# Patient Record
Sex: MALE | Race: WHITE | NOT HISPANIC OR LATINO | Employment: OTHER | URBAN - METROPOLITAN AREA
[De-identification: names, ages, dates, MRNs, and addresses within clinical notes are randomized per-mention and may not be internally consistent; named-entity substitution may affect disease eponyms.]

---

## 2017-01-11 RX ORDER — CLONAZEPAM 1 MG/1
TABLET ORAL
Qty: 60 TABLET | OUTPATIENT
Start: 2017-01-11

## 2017-01-13 ENCOUNTER — OFFICE VISIT (OUTPATIENT)
Dept: CARDIOLOGY | Facility: CLINIC | Age: 67
End: 2017-01-13
Payer: MEDICARE

## 2017-01-13 VITALS
HEIGHT: 73 IN | BODY MASS INDEX: 22.84 KG/M2 | SYSTOLIC BLOOD PRESSURE: 124 MMHG | WEIGHT: 172.31 LBS | HEART RATE: 81 BPM | DIASTOLIC BLOOD PRESSURE: 70 MMHG

## 2017-01-13 DIAGNOSIS — E78.5 HYPERLIPIDEMIA, UNSPECIFIED HYPERLIPIDEMIA TYPE: ICD-10-CM

## 2017-01-13 DIAGNOSIS — I10 ESSENTIAL HYPERTENSION: ICD-10-CM

## 2017-01-13 DIAGNOSIS — I25.10 CORONARY ARTERY DISEASE INVOLVING NATIVE CORONARY ARTERY OF NATIVE HEART WITHOUT ANGINA PECTORIS: Primary | ICD-10-CM

## 2017-01-13 DIAGNOSIS — I25.5 ISCHEMIC CARDIOMYOPATHY: ICD-10-CM

## 2017-01-13 DIAGNOSIS — I73.9 PERIPHERAL ARTERIAL DISEASE: ICD-10-CM

## 2017-01-13 DIAGNOSIS — Z95.1 HX OF CABG: ICD-10-CM

## 2017-01-13 PROCEDURE — 99213 OFFICE O/P EST LOW 20 MIN: CPT | Mod: PBBFAC,PO | Performed by: NURSE PRACTITIONER

## 2017-01-13 PROCEDURE — 99999 PR PBB SHADOW E&M-EST. PATIENT-LVL III: CPT | Mod: PBBFAC,,, | Performed by: NURSE PRACTITIONER

## 2017-01-13 PROCEDURE — 99213 OFFICE O/P EST LOW 20 MIN: CPT | Mod: S$PBB,,, | Performed by: NURSE PRACTITIONER

## 2017-01-13 NOTE — MR AVS SNAPSHOT
[facility]  [address] [facility]  [place] [address]  Phone: [phone]  Fax: [phone]                  Ilana Carr   2017 11:00 AM   Office Visit    Description:  Male : 1950   Provider:  Adelaida Esparza NP   Department:  [facility]           Reason for Visit     Follow-up                To Do List           Future Appointments        Provider Department Dept Phone    2017 11:30 AM LABORATORY, [place] [facility] [phone]    3/7/2017 9:00 AM Amauri Delatorre MD [facility] [phone]      Goals (5 Years of Data)     None      Follow-Up and Disposition     Return in about 3 months (around 2017).      [facility]     [facility] Care [facility] -  Assistance  Registered nurses in the [facility] provide clinical advisement, health education, appointment booking, and other advisory services.  Call for this free service at [phone].             Medications           Message regarding Medications     Verify the changes and/or additions to your medication regime listed below are the same as discussed with your clinician today.  If any of these changes or additions are incorrect, please notify your healthcare provider.        STOP taking these medications     oxycodone-acetaminophen (PERCOCET)  mg per tablet Take 1 tablet by mouth every 6 (six) hours as needed.           Verify that the below list of medications is an accurate representation of the medications you are currently taking.  If none reported, the list may be blank. If incorrect, please contact your healthcare provider. Carry this list with you in case of emergency.           Current Medications     albuterol 90 mcg/actuation inhaler Inhale 2 puffs into the lungs every 6 (six) hours as needed for Wheezing or Shortness of Breath.    aspirin (ECOTRIN) 81 MG EC tablet Take 81 mg by mouth once daily.    atorvastatin (LIPITOR) 20 MG tablet  "Take 20 mg by mouth once daily.    benazepril (LOTENSIN) 10 MG tablet Take 1 tablet (10 mg total) by mouth 2 (two) times daily.    buPROPion (WELLBUTRIN SR) 150 MG TBSR 12 hr tablet Take 1 tablet (150 mg total) by mouth 2 (two) times daily.    carvedilol (COREG) 12.5 MG tablet Take 12.5 mg by mouth 2 (two) times daily with meals.     clonazePAM (KLONOPIN) 1 MG tablet Take 1 tablet (1 mg total) by mouth 2 (two) times daily.    ferrous gluconate (FERGON) 325 MG Tab Take 1 tablet (325 mg total) by mouth daily with breakfast.    hydrochlorothiazide (MICROZIDE) 12.5 mg capsule Take 1 capsule (12.5 mg total) by mouth once daily.    krill-om3-dha-epa-om6-lip-astx 1,500-165-67.5 mg Cap Take 1 capsule by mouth.    multivitamin with minerals tablet Take 1 tablet by mouth.    nicotine polacrilex 2 MG Lozg Take 1 lozenge (2 mg total) by mouth as needed (take 4-8 pcs daily as needed).    omeprazole (PRILOSEC) 40 MG capsule Take 40 mg by mouth every morning.     zolpidem (AMBIEN) 10 mg Tab Take 10 mg by mouth as needed.            Clinical Reference Information           Vital Signs - Last Recorded  Most recent update: 1/13/2017 10:41 AM by Xiomara Fields MA    BP Pulse Ht Wt BMI    124/70 (BP Location: Left arm, Patient Position: Sitting, BP Method: Automatic) 81 6' 1" (1.854 m) 78.1 kg (172 lb 4.6 oz) 22.73 kg/m2      Blood Pressure          Most Recent Value    BP  124/70      Allergies as of 1/13/2017     No Known Drug Allergies      Immunizations Administered on Date of Encounter - 1/13/2017     None      "

## 2017-01-13 NOTE — PROGRESS NOTES
Subjective:    Patient ID:  Ilana Carr is a 66 y.o. male who presents for follow-up of Follow-up (Patient did not bring his medicine or list with him)      HPI: . Ilana Carr presents to the clinic for follow up of CAD, H/O CABG, CMP (EF 40-45% by echo 8/26/16), HTN, HLP. he stated that he is doing very well; he denied any chest pain or SOB. He stated that he has depression, and Wellbutrin is helping with that-he feels better.    Medications: he is not missing any doses.  Sodium: he does add small amount of salt to foods,  he is not  reading labels for sodium content.   Diet: eating 3 meals a day and boost in between meals to maintain his weight.  Exercise: he walks about 2 miles three times a week.  Tobacco:former smoker; uses nicotine losenges every few days. Nearly ready to stop those.. no alcohol use     Weight: 78.1 kg (172 lb 4.6 oz) he states that his daily weights has been stable  Wt Readings from Last 3 Encounters:   01/13/17 78.1 kg (172 lb 4.6 oz)   11/30/16 78 kg (171 lb 13.6 oz)   11/29/16 76.6 kg (168 lb 15.7 oz)     BP log: None. He does not monitor BP at home.    Review of Systems   Constitution: Negative for chills, decreased appetite, fever, night sweats, weight gain and weight loss.   HENT: Negative for congestion.    Cardiovascular: Negative for chest pain, claudication, cyanosis, dyspnea on exertion, irregular heartbeat, leg swelling, near-syncope, orthopnea, palpitations, paroxysmal nocturnal dyspnea and syncope.   Respiratory: Negative for cough, hemoptysis, shortness of breath, sputum production and wheezing.    Hematologic/Lymphatic: Negative for adenopathy and bleeding problem. Does not bruise/bleed easily.   Skin: Negative for color change and nail changes.   Gastrointestinal: Negative for bloating, abdominal pain, change in bowel habit, heartburn, hematochezia, melena, nausea and vomiting.   Genitourinary: Negative for hematuria.   Neurological: Negative for dizziness and  light-headedness.   Psychiatric/Behavioral: Negative for altered mental status.       Objective:   Physical Exam   Constitutional: He is oriented to person, place, and time. He appears well-developed and well-nourished. No distress.   HENT:   Head: Normocephalic and atraumatic.   Eyes: Conjunctivae are normal. No scleral icterus.   Neck: Neck supple. No JVD present. No tracheal deviation present. No thyromegaly present.   Cardiovascular: Normal rate, regular rhythm, normal heart sounds and intact distal pulses.  Exam reveals no gallop and no friction rub.    No murmur heard.  Pulmonary/Chest: Effort normal and breath sounds normal. No respiratory distress. He has no wheezes. He has no rales. He exhibits no tenderness.   Abdominal: Soft. Bowel sounds are normal. He exhibits no distension and no mass. There is no tenderness. There is no rebound and no guarding.   Musculoskeletal: Normal range of motion. He exhibits no edema.   Lymphadenopathy:     He has no cervical adenopathy.   Neurological: He is alert and oriented to person, place, and time.   Skin: Skin is warm and dry. No rash noted. He is not diaphoretic. No erythema. No pallor.   Pink   Psychiatric: He has a normal mood and affect.        Assessment:      1. Coronary artery disease involving native coronary artery of native heart without angina pectoris    2. Hx of CABG    3. Ischemic cardiomyopathy    4. Peripheral arterial disease    5. Essential hypertension    6. Hyperlipidemia, unspecified hyperlipidemia type        Plan:     Continue current medications as directed.  Sodium moderation.  Continue exercise.  Monitor BP at home.  Reinforced tobacco cessation.  Follow up in 3 months or call sooner for any problems.

## 2017-01-19 RX ORDER — BUPROPION HYDROCHLORIDE 150 MG/1
150 TABLET, EXTENDED RELEASE ORAL 2 TIMES DAILY
Qty: 180 TABLET | Refills: 3 | Status: SHIPPED | OUTPATIENT
Start: 2017-01-19 | End: 2017-10-04 | Stop reason: DRUGHIGH

## 2017-01-19 NOTE — TELEPHONE ENCOUNTER
----- Message from Geri Verma sent at 1/19/2017 10:24 AM CST -----  bupropion 150 refill needed..[phone]    [facility] - [place], [place] - [address]  [address]  [facility] [address]  Phone: [phone] Fax: [phone]

## 2017-01-24 RX ORDER — ALBUTEROL SULFATE 90 UG/1
2 AEROSOL, METERED RESPIRATORY (INHALATION) EVERY 6 HOURS PRN
Qty: 18 G | Refills: 5 | Status: SHIPPED | OUTPATIENT
Start: 2017-01-24 | End: 2018-12-07 | Stop reason: SDUPTHER

## 2017-01-27 ENCOUNTER — TELEPHONE (OUTPATIENT)
Dept: CARDIOLOGY | Facility: CLINIC | Age: 67
End: 2017-01-27

## 2017-01-27 NOTE — TELEPHONE ENCOUNTER
Cardiac rehab called to report the patients BP is running 140's/90's which is higher than usual.The patient denies any stress related issues or pain.He admits medication compliance.Suggested patient do a BP log  And will check next week If these abnormal clinical findings persist, appropriate workup will be completed. The patient understands that follow up is required to elucidate the situation. Needed we will have an appointment made to see provider.The patient agreed.

## 2017-01-31 ENCOUNTER — PATIENT MESSAGE (OUTPATIENT)
Dept: FAMILY MEDICINE | Facility: CLINIC | Age: 67
End: 2017-01-31

## 2017-01-31 RX ORDER — ZOLPIDEM TARTRATE 10 MG/1
TABLET ORAL
Qty: 30 TABLET | Refills: 2 | Status: SHIPPED | OUTPATIENT
Start: 2017-01-31 | End: 2017-01-31 | Stop reason: SDUPTHER

## 2017-01-31 RX ORDER — ZOLPIDEM TARTRATE 10 MG/1
TABLET ORAL
Qty: 30 TABLET | Refills: 2 | Status: SHIPPED | OUTPATIENT
Start: 2017-01-31 | End: 2017-04-01 | Stop reason: SDUPTHER

## 2017-02-01 ENCOUNTER — PATIENT MESSAGE (OUTPATIENT)
Dept: FAMILY MEDICINE | Facility: CLINIC | Age: 67
End: 2017-02-01

## 2017-02-17 ENCOUNTER — PATIENT MESSAGE (OUTPATIENT)
Dept: FAMILY MEDICINE | Facility: CLINIC | Age: 67
End: 2017-02-17

## 2017-02-17 RX ORDER — CLONAZEPAM 1 MG/1
TABLET ORAL
Qty: 60 TABLET | Refills: 2 | Status: SHIPPED | OUTPATIENT
Start: 2017-02-17 | End: 2017-04-14 | Stop reason: SDUPTHER

## 2017-02-17 NOTE — TELEPHONE ENCOUNTER
Last seen by you 11/30/16, will you authorize another refill or is appt needed now, please advise.

## 2017-02-20 ENCOUNTER — TELEPHONE (OUTPATIENT)
Dept: CARDIOLOGY | Facility: CLINIC | Age: 67
End: 2017-02-20

## 2017-02-20 NOTE — TELEPHONE ENCOUNTER
Received paperwork from [facility] to complete for patient.I called to speak with patient at both numbers and no answer.I left message the papers were received and will contact him when they are completed.

## 2017-02-21 ENCOUNTER — PATIENT MESSAGE (OUTPATIENT)
Dept: FAMILY MEDICINE | Facility: CLINIC | Age: 67
End: 2017-02-21

## 2017-02-21 ENCOUNTER — PATIENT MESSAGE (OUTPATIENT)
Dept: CARDIOLOGY | Facility: CLINIC | Age: 67
End: 2017-02-21

## 2017-02-22 ENCOUNTER — TELEPHONE (OUTPATIENT)
Dept: CARDIOLOGY | Facility: CLINIC | Age: 67
End: 2017-02-22

## 2017-02-22 NOTE — TELEPHONE ENCOUNTER
The patient was called and notified that his paperwork for the [facility] was requesting a stress test to complete his evaluation for disabilty status.He stated he did not want to do that at this time so paperwork can be completed as is and patient  tomorrow.

## 2017-02-24 ENCOUNTER — TELEPHONE (OUTPATIENT)
Dept: CARDIOLOGY | Facility: CLINIC | Age: 67
End: 2017-02-24

## 2017-02-24 ENCOUNTER — LAB VISIT (OUTPATIENT)
Dept: LAB | Facility: HOSPITAL | Age: 67
End: 2017-02-24
Attending: INTERNAL MEDICINE
Payer: MEDICARE

## 2017-02-24 DIAGNOSIS — I10 ESSENTIAL HYPERTENSION: ICD-10-CM

## 2017-02-24 DIAGNOSIS — I25.10 CORONARY ARTERY DISEASE INVOLVING NATIVE CORONARY ARTERY OF NATIVE HEART WITHOUT ANGINA PECTORIS: Primary | ICD-10-CM

## 2017-02-24 DIAGNOSIS — I25.10 CORONARY ARTERY DISEASE INVOLVING NATIVE CORONARY ARTERY OF NATIVE HEART WITHOUT ANGINA PECTORIS: ICD-10-CM

## 2017-02-24 LAB
ANION GAP SERPL CALC-SCNC: 12 MMOL/L
BUN SERPL-MCNC: 21 MG/DL
CALCIUM SERPL-MCNC: 9.9 MG/DL
CHLORIDE SERPL-SCNC: 106 MMOL/L
CO2 SERPL-SCNC: 26 MMOL/L
CREAT SERPL-MCNC: 1.1 MG/DL
EST. GFR  (AFRICAN AMERICAN): >60 ML/MIN/1.73 M^2
EST. GFR  (NON AFRICAN AMERICAN): >60 ML/MIN/1.73 M^2
GLUCOSE SERPL-MCNC: 119 MG/DL
MAGNESIUM SERPL-MCNC: 2 MG/DL
POTASSIUM SERPL-SCNC: 5.3 MMOL/L
SODIUM SERPL-SCNC: 144 MMOL/L

## 2017-02-24 PROCEDURE — 36415 COLL VENOUS BLD VENIPUNCTURE: CPT | Mod: PO

## 2017-02-24 PROCEDURE — 83735 ASSAY OF MAGNESIUM: CPT

## 2017-02-24 PROCEDURE — 80048 BASIC METABOLIC PNL TOTAL CA: CPT

## 2017-02-24 NOTE — TELEPHONE ENCOUNTER
The patient is stable and doing well at rehab per Cardiac Rehab nurse.His BP stable and is in 31 of 36 weeks.He today is having triplets and trigeminy for short period.The patient did not experience any symptoms during that time and his BP was stable.Labwork ordered and Dr House notified.

## 2017-02-24 NOTE — TELEPHONE ENCOUNTER
The patient is participating in cardiac Rehab at [facility] and it was reported he was having triplets and trigeminy . No symptoms. Patient notified of lab work scheduled today after rehab.message to Dr House.

## 2017-02-27 ENCOUNTER — PATIENT MESSAGE (OUTPATIENT)
Dept: CARDIOLOGY | Facility: CLINIC | Age: 67
End: 2017-02-27

## 2017-02-28 ENCOUNTER — LAB VISIT (OUTPATIENT)
Dept: LAB | Facility: HOSPITAL | Age: 67
End: 2017-02-28
Attending: FAMILY MEDICINE
Payer: MEDICARE

## 2017-02-28 DIAGNOSIS — D64.9 ANEMIA, UNSPECIFIED TYPE: ICD-10-CM

## 2017-02-28 LAB
BASOPHILS # BLD AUTO: 0.01 K/UL
BASOPHILS NFR BLD: 0.1 %
DIFFERENTIAL METHOD: ABNORMAL
EOSINOPHIL # BLD AUTO: 0.3 K/UL
EOSINOPHIL NFR BLD: 4.8 %
ERYTHROCYTE [DISTWIDTH] IN BLOOD BY AUTOMATED COUNT: 16 %
FERRITIN SERPL-MCNC: 50 NG/ML
HCT VFR BLD AUTO: 43.8 %
HGB BLD-MCNC: 13.9 G/DL
IRON SERPL-MCNC: 132 UG/DL
LYMPHOCYTES # BLD AUTO: 1.3 K/UL
LYMPHOCYTES NFR BLD: 18.3 %
MCH RBC QN AUTO: 29.9 PG
MCHC RBC AUTO-ENTMCNC: 31.7 %
MCV RBC AUTO: 94 FL
MONOCYTES # BLD AUTO: 0.7 K/UL
MONOCYTES NFR BLD: 9.9 %
NEUTROPHILS # BLD AUTO: 4.6 K/UL
NEUTROPHILS NFR BLD: 66.6 %
PLATELET # BLD AUTO: 188 K/UL
PMV BLD AUTO: 10.7 FL
RBC # BLD AUTO: 4.65 M/UL
SATURATED IRON: 40 %
TOTAL IRON BINDING CAPACITY: 334 UG/DL
TRANSFERRIN SERPL-MCNC: 226 MG/DL
WBC # BLD AUTO: 6.84 K/UL

## 2017-02-28 PROCEDURE — 84466 ASSAY OF TRANSFERRIN: CPT

## 2017-02-28 PROCEDURE — 85025 COMPLETE CBC W/AUTO DIFF WBC: CPT

## 2017-02-28 PROCEDURE — 83540 ASSAY OF IRON: CPT

## 2017-02-28 PROCEDURE — 36415 COLL VENOUS BLD VENIPUNCTURE: CPT | Mod: PO

## 2017-02-28 PROCEDURE — 82728 ASSAY OF FERRITIN: CPT

## 2017-03-02 ENCOUNTER — PATIENT MESSAGE (OUTPATIENT)
Dept: FAMILY MEDICINE | Facility: CLINIC | Age: 67
End: 2017-03-02

## 2017-03-03 ENCOUNTER — PATIENT MESSAGE (OUTPATIENT)
Dept: FAMILY MEDICINE | Facility: CLINIC | Age: 67
End: 2017-03-03

## 2017-03-03 RX ORDER — ATORVASTATIN CALCIUM 20 MG/1
20 TABLET, FILM COATED ORAL DAILY
Qty: 90 TABLET | Refills: 2 | Status: SHIPPED | OUTPATIENT
Start: 2017-03-03

## 2017-03-03 RX ORDER — CARVEDILOL 12.5 MG/1
12.5 TABLET ORAL 2 TIMES DAILY WITH MEALS
Qty: 180 TABLET | Refills: 2 | Status: SHIPPED | OUTPATIENT
Start: 2017-03-03 | End: 2019-04-12 | Stop reason: DRUGHIGH

## 2017-03-05 ENCOUNTER — PATIENT MESSAGE (OUTPATIENT)
Dept: VASCULAR SURGERY | Facility: CLINIC | Age: 67
End: 2017-03-05

## 2017-03-05 ENCOUNTER — PATIENT MESSAGE (OUTPATIENT)
Dept: FAMILY MEDICINE | Facility: CLINIC | Age: 67
End: 2017-03-05

## 2017-03-08 ENCOUNTER — OFFICE VISIT (OUTPATIENT)
Dept: GASTROENTEROLOGY | Facility: CLINIC | Age: 67
End: 2017-03-08
Payer: MEDICARE

## 2017-03-08 ENCOUNTER — LAB VISIT (OUTPATIENT)
Dept: LAB | Facility: HOSPITAL | Age: 67
End: 2017-03-08
Attending: NURSE PRACTITIONER
Payer: MEDICARE

## 2017-03-08 VITALS
DIASTOLIC BLOOD PRESSURE: 80 MMHG | WEIGHT: 173.75 LBS | SYSTOLIC BLOOD PRESSURE: 122 MMHG | HEART RATE: 68 BPM | HEIGHT: 73 IN | BODY MASS INDEX: 23.03 KG/M2

## 2017-03-08 DIAGNOSIS — B18.2 CHRONIC HEPATITIS C WITHOUT HEPATIC COMA: Primary | ICD-10-CM

## 2017-03-08 DIAGNOSIS — B18.2 CHRONIC HEPATITIS C WITHOUT HEPATIC COMA: ICD-10-CM

## 2017-03-08 PROCEDURE — 87522 HEPATITIS C REVRS TRNSCRPJ: CPT

## 2017-03-08 PROCEDURE — 36415 COLL VENOUS BLD VENIPUNCTURE: CPT

## 2017-03-08 PROCEDURE — 99213 OFFICE O/P EST LOW 20 MIN: CPT | Mod: S$PBB,,, | Performed by: NURSE PRACTITIONER

## 2017-03-08 PROCEDURE — 99999 PR PBB SHADOW E&M-EST. PATIENT-LVL III: CPT | Mod: PBBFAC,,, | Performed by: NURSE PRACTITIONER

## 2017-03-08 NOTE — MR AVS SNAPSHOT
[facility]  [address]  [place] [address]  Phone: [phone]  Fax: [phone]                  Ilana Carr   3/8/2017 9:20 AM   Office Visit    Description:  Male : 1950   Provider:  Candy Griffin NP   [facility]           Reason for Visit     Hepatitis C           Diagnoses this Visit        Comments    Chronic hepatitis C without hepatic coma    -  Primary            To Do List           Future Appointments        Provider Department Dept Phone    3/8/2017 11:00 AM LAB, SAME DAY [facility] [phone]      Goals (5 Years of Data)     None      Follow-Up and Disposition     Return if symptoms worsen or fail to improve.      [facility]     [facility] Care [facility] -  Assistance  Registered nurses in the [facility] provide clinical advisement, health education, appointment booking, and other advisory services.  Call for this free service at [phone].             Medications           Message regarding Medications     Verify the changes and/or additions to your medication regime listed below are the same as discussed with your clinician today.  If any of these changes or additions are incorrect, please notify your healthcare provider.             Verify that the below list of medications is an accurate representation of the medications you are currently taking.  If none reported, the list may be blank. If incorrect, please contact your healthcare provider. Carry this list with you in case of emergency.           Current Medications     albuterol 90 mcg/actuation inhaler Inhale 2 puffs into the lungs every 6 (six) hours as needed for Wheezing or Shortness of Breath.    aspirin (ECOTRIN) 81 MG EC tablet Take 81 mg by mouth once daily.    atorvastatin (LIPITOR) 20 MG tablet Take 1 tablet (20 mg total) by mouth once daily.    benazepril (LOTENSIN) 10 MG tablet Take 1 tablet  "(10 mg total) by mouth 2 (two) times daily.    buPROPion (WELLBUTRIN SR) 150 MG TBSR 12 hr tablet Take 1 tablet (150 mg total) by mouth 2 (two) times daily.    carvedilol (COREG) 12.5 MG tablet Take 1 tablet (12.5 mg total) by mouth 2 (two) times daily with meals.    clonazePAM (KLONOPIN) 1 MG tablet Take 1 tablet (1 mg total) by mouth 2 (two) times daily.    ferrous gluconate (FERGON) 325 MG Tab Take 1 tablet (325 mg total) by mouth daily with breakfast.    hydrochlorothiazide (MICROZIDE) 12.5 mg capsule Take 1 capsule (12.5 mg total) by mouth once daily.    krill-om3-dha-epa-om6-lip-astx 1,500-165-67.5 mg Cap Take 1 capsule by mouth.    multivitamin with minerals tablet Take 1 tablet by mouth.    omeprazole (PRILOSEC) 40 MG capsule Take 40 mg by mouth every morning.     UNABLE TO FIND Med Name: Nicotine Lozenge    zolpidem (AMBIEN) 10 mg Tab Take one by mouth, qhs, as needed    krill-om3-dha-epa-om6-lip-astx 1,500-165-67.5 mg Cap Take 1 capsule by mouth.           Clinical Reference Information           Your Vitals Were     BP Pulse Height Weight BMI    122/80 68 6' 1" (1.854 m) 78.8 kg (173 lb 11.6 oz) 22.92 kg/m2      Blood Pressure          Most Recent Value    BP  122/80      Allergies as of 3/8/2017     No Known Drug Allergies      Immunizations Administered on Date of Encounter - 3/8/2017     None      Orders Placed During Today's Visit     Future Labs/Procedures Expected by Expires    Hepatitis C RNA, quantitative, PCR  3/8/2017 5/7/2018      Language Assistance Services     ATTENTION: Language assistance services are available, free of charge. Please call [phone].      ATENCIÓN: Si habla selina, tiene a tavarez disposición servicios gratuitos de asistencia lingüística. Llame al [phone].     CHÚ Ý: N?u b?n nói Ti?ng Vi?t, có các d?ch v? h? tr? ngôn ng? mi?n phí dành cho b?n. G?i s? [phone].         [facility] complies with applicable Federal civil rights laws and does not " discriminate on the basis of race, color, national origin, age, disability, or sex.

## 2017-03-08 NOTE — PROGRESS NOTES
Clinic Follow Up:  [facility] Follow Up Note    Reason for Follow Up:  The encounter diagnosis was Chronic hepatitis C without hepatic coma.    PCP: Long Acosta       HPI:  This is a 66 y.o. male here for follow up of the above  Patient is an established patient but is new to me. He was previously treated with [facility] for Hepatitis C. He cleared the virus after one month of treatment. He is here for a 1 year follow up to repeat his viral load. He is doing well and has no complaints at this time. He denies any abdominal pian, nausea/vomiting, change in bowel pattern, hematochezia, melena, or weight loss.     Review of Systems   Constitutional: Negative for activity change and appetite change.        As per interval history above   Respiratory: Negative for cough and chest tightness.    Cardiovascular: Negative for chest pain.   Gastrointestinal: Negative for abdominal distention, abdominal pain, anal bleeding, blood in stool, constipation, diarrhea, nausea, rectal pain and vomiting.   Skin: Negative for color change and rash.       Medical History:  Past Medical History:   Diagnosis Date    Alcoholism in remission     Anxiety     CAD (coronary artery disease) 9/2/2016    Chronic back pain     pain mgt    Chronic hepatitis C without hepatic coma 1/8/2016    Colon polyps     COPD (chronic obstructive pulmonary disease)     Depressed left ventricular systolic function 9/6/2016    Depression     Diverticulosis     DJD (degenerative joint disease) of hip     Emphysema of lung     Esophageal ulcer with bleeding     GERD (gastroesophageal reflux disease)     Hepatitis C     completed Interferon therapy    Hiatal hernia     Hyperlipidemia with target LDL less than 100 1/7/2015    Hypertension     Hypertriglyceridemia     Osteomyelitis of right foot     Peripheral arterial disease     Spells     last Sept 2013, not sure if it was TIA v seizure    Squamous cell skin cancer      Testosterone deficiency     Tubular adenoma of colon        Surgical History:   Past Surgical History:   Procedure Laterality Date    CARDIAC CATHETERIZATION      COLONOSCOPY  2008    COLONOSCOPY N/A 1/8/2016    Procedure: COLONOSCOPY;  Surgeon: Amauri Delatorre MD;  Location[facility];  Service: Endoscopy;  Laterality: N/A;    CORONARY ARTERY BYPASS GRAFT  09/09/2016    FOOT SURGERY Right 12/6/2013    fifth metatarsal head resection [Other]    ORIF WRIST FRACTURE      LT    SHOULDER SURGERY      left    TONSILLECTOMY      UPPER GASTROINTESTINAL ENDOSCOPY  2010       Family History:   Family History   Problem Relation Age of Onset    Lung cancer Mother     Hypertension Mother     Hypertension Father     Arrhythmia Brother 60    Colon cancer Neg Hx     Stomach cancer Neg Hx        Social History:   Social History   Substance Use Topics    Smoking status: Former Smoker     Packs/day: 1.50     Years: 40.00     Types: Cigarettes, Cigars     Quit date: 9/9/2016    Smokeless tobacco: Former User     Quit date: 9/9/2016    Alcohol use No      Comment: quit 30 y- prior alcoholism       Allergies:   Review of patient's allergies indicates:   Allergen Reactions    No known drug allergies        Home Medications:  Current Outpatient Prescriptions on File Prior to Visit   Medication Sig Dispense Refill    albuterol 90 mcg/actuation inhaler Inhale 2 puffs into the lungs every 6 (six) hours as needed for Wheezing or Shortness of Breath. 18 g 5    aspirin (ECOTRIN) 81 MG EC tablet Take 81 mg by mouth once daily.      atorvastatin (LIPITOR) 20 MG tablet Take 1 tablet (20 mg total) by mouth once daily. 90 tablet 2    benazepril (LOTENSIN) 10 MG tablet Take 1 tablet (10 mg total) by mouth 2 (two) times daily. 180 tablet 3    buPROPion (WELLBUTRIN SR) 150 MG TBSR 12 hr tablet Take 1 tablet (150 mg total) by mouth 2 (two) times daily. 180 tablet 3    carvedilol (COREG) 12.5 MG tablet Take 1 tablet (12.5 mg  "total) by mouth 2 (two) times daily with meals. 180 tablet 2    clonazePAM (KLONOPIN) 1 MG tablet Take 1 tablet (1 mg total) by mouth 2 (two) times daily. 60 tablet 2    ferrous gluconate (FERGON) 325 MG Tab Take 1 tablet (325 mg total) by mouth daily with breakfast. 90 tablet 0    hydrochlorothiazide (MICROZIDE) 12.5 mg capsule Take 1 capsule (12.5 mg total) by mouth once daily. 90 capsule 3    multivitamin with minerals tablet Take 1 tablet by mouth.      omeprazole (PRILOSEC) 40 MG capsule Take 40 mg by mouth every morning.       zolpidem (AMBIEN) 10 mg Tab Take one by mouth, qhs, as needed 30 tablet 2    krill-om3-dha-epa-om6-lip-astx 1,500-165-67.5 mg Cap Take 1 capsule by mouth.       No current facility-administered medications on file prior to visit.        Physical Exam:  Vital Signs:  /80  Pulse 68  Ht 6' 1" (1.854 m)  Wt 78.8 kg (173 lb 11.6 oz)  BMI 22.92 kg/m2  Body mass index is 22.92 kg/(m^2).  Physical Exam   Constitutional: He is oriented to person, place, and time and well-developed, well-nourished, and in no distress. No distress.   HENT:   Head: Normocephalic.   Eyes: Conjunctivae are normal. Pupils are equal, round, and reactive to light.   Cardiovascular: Normal rate, regular rhythm and normal heart sounds.    Pulmonary/Chest: Effort normal and breath sounds normal. No respiratory distress.   Abdominal: Soft. Bowel sounds are normal. He exhibits no distension. There is no tenderness.   Neurological: He is alert and oriented to person, place, and time. No cranial nerve deficit.   Skin: Skin is warm and dry. No rash noted.   Psychiatric: Mood and affect normal.   Vitals reviewed.        CRC Screening: Up to date with screening colonoscopy.    Assessment:  1. Chronic hepatitis C without hepatic coma        Recommendations:  Chronic hepatitis C without hepatic coma  - Patient doing well with no evidence of cirrhosis or liver damage.   - Will check viral load.  - If not detected, " then no need for routine follow up. May follow up as needed.   -     Hepatitis C RNA, quantitative, PCR; Future; Expected date: 3/8/17        Return to Clinic:  Return if symptoms worsen or fail to improve.    Thank you for the opportunity to participate in the care of this patient.  CARL LoveC

## 2017-03-12 ENCOUNTER — PATIENT MESSAGE (OUTPATIENT)
Dept: GASTROENTEROLOGY | Facility: CLINIC | Age: 67
End: 2017-03-12

## 2017-03-13 LAB
HCV LOG: <1.08 LOG (10) IU/ML
HCV RNA QUANT PCR: <12 IU/ML
HCV, QUALITATIVE: NOT DETECTED IU/ML

## 2017-04-01 ENCOUNTER — PATIENT MESSAGE (OUTPATIENT)
Dept: FAMILY MEDICINE | Facility: CLINIC | Age: 67
End: 2017-04-01

## 2017-04-03 RX ORDER — ZOLPIDEM TARTRATE 10 MG/1
TABLET ORAL
Qty: 30 TABLET | Refills: 1 | Status: SHIPPED | OUTPATIENT
Start: 2017-04-03 | End: 2017-05-15

## 2017-04-04 ENCOUNTER — PATIENT MESSAGE (OUTPATIENT)
Dept: FAMILY MEDICINE | Facility: CLINIC | Age: 67
End: 2017-04-04

## 2017-04-04 NOTE — TELEPHONE ENCOUNTER
I would not recommend using albuterol pills, these are no longer considered an acceptable treatment or standard of care.  I have never seen anyone use them.  Too many side effects, particularly with his history of heart disease.  We can refer him to a pulmonary specialist if he would like to address otherwise.

## 2017-04-05 ENCOUNTER — PATIENT MESSAGE (OUTPATIENT)
Dept: FAMILY MEDICINE | Facility: CLINIC | Age: 67
End: 2017-04-05

## 2017-04-11 ENCOUNTER — TELEPHONE (OUTPATIENT)
Dept: SMOKING CESSATION | Facility: CLINIC | Age: 67
End: 2017-04-11

## 2017-04-12 ENCOUNTER — PATIENT MESSAGE (OUTPATIENT)
Dept: RESEARCH | Facility: HOSPITAL | Age: 67
End: 2017-04-12

## 2017-04-12 ENCOUNTER — CLINICAL SUPPORT (OUTPATIENT)
Dept: SMOKING CESSATION | Facility: CLINIC | Age: 67
End: 2017-04-12
Payer: COMMERCIAL

## 2017-04-12 DIAGNOSIS — F17.200 NICOTINE DEPENDENCE: Primary | ICD-10-CM

## 2017-04-12 PROCEDURE — 99407 BEHAV CHNG SMOKING > 10 MIN: CPT | Mod: S$GLB,,,

## 2017-04-17 RX ORDER — CLONAZEPAM 1 MG/1
TABLET ORAL
Qty: 60 TABLET | Refills: 1 | Status: SHIPPED | OUTPATIENT
Start: 2017-04-17 | End: 2017-07-18 | Stop reason: SDUPTHER

## 2017-04-18 ENCOUNTER — OFFICE VISIT (OUTPATIENT)
Dept: CARDIOLOGY | Facility: CLINIC | Age: 67
End: 2017-04-18
Payer: MEDICARE

## 2017-04-18 VITALS
DIASTOLIC BLOOD PRESSURE: 87 MMHG | HEART RATE: 68 BPM | WEIGHT: 172.63 LBS | HEIGHT: 73 IN | BODY MASS INDEX: 22.88 KG/M2 | SYSTOLIC BLOOD PRESSURE: 145 MMHG

## 2017-04-18 DIAGNOSIS — I25.10 CORONARY ARTERY DISEASE INVOLVING NATIVE CORONARY ARTERY OF NATIVE HEART WITHOUT ANGINA PECTORIS: ICD-10-CM

## 2017-04-18 DIAGNOSIS — I25.5 ISCHEMIC CARDIOMYOPATHY: ICD-10-CM

## 2017-04-18 DIAGNOSIS — I10 ESSENTIAL HYPERTENSION: ICD-10-CM

## 2017-04-18 DIAGNOSIS — E78.5 HYPERLIPIDEMIA, UNSPECIFIED HYPERLIPIDEMIA TYPE: ICD-10-CM

## 2017-04-18 DIAGNOSIS — Z95.1 HX OF CABG: ICD-10-CM

## 2017-04-18 DIAGNOSIS — R06.02 SOB (SHORTNESS OF BREATH): Primary | ICD-10-CM

## 2017-04-18 DIAGNOSIS — I73.9 PERIPHERAL ARTERIAL DISEASE: ICD-10-CM

## 2017-04-18 PROCEDURE — 99999 PR PBB SHADOW E&M-EST. PATIENT-LVL III: CPT | Mod: PBBFAC,,, | Performed by: NURSE PRACTITIONER

## 2017-04-18 PROCEDURE — 99213 OFFICE O/P EST LOW 20 MIN: CPT | Mod: PBBFAC,PO | Performed by: NURSE PRACTITIONER

## 2017-04-18 PROCEDURE — 99213 OFFICE O/P EST LOW 20 MIN: CPT | Mod: S$PBB,,, | Performed by: NURSE PRACTITIONER

## 2017-04-18 RX ORDER — FUROSEMIDE 20 MG/1
20 TABLET ORAL DAILY PRN
Qty: 30 TABLET | Refills: 11 | Status: SHIPPED | OUTPATIENT
Start: 2017-04-18 | End: 2017-05-10

## 2017-04-18 NOTE — PATIENT INSTRUCTIONS
Hold hydrocholorothiazide for the two days that you take lasix.  Weigh yourself daily.  Reduce sodium intake

## 2017-04-18 NOTE — MR AVS SNAPSHOT
[facility]  [address]  [place] [address]  Phone: [phone]  Fax: [phone]                  Ilana Carr   2017 8:20 AM   Office Visit    Description:  Male : 1950   Provider:  Adelaida Esparza NP   Department:  [facility]           Reason for Visit     Follow-up           Diagnoses this Visit        Comments    SOB (shortness of breath)    -  Primary            To Do List           Future Appointments        Provider Department Dept Phone    2017 8:00 AM STEPHANIA SMITH SCHEDULE [facility] [phone]    5/10/2017 10:20 AM Adelaida Esparza NP [facility] [phone]      Goals (5 Years of Data)     None       These Medications        Disp Refills Start End    furosemide (LASIX) 20 MG tablet 30 tablet 11 2017    Take 1 tablet (20 mg total) by mouth daily as needed. - Oral    Pharmacy: [facility] - [place] [address] #: [phone]         [facility]     [facility] Care [facility] -  Assistance  Unless otherwise directed by your provider, please contact [facility], our nurse care line that is available for  assistance.     Registered nurses in the [facility] provide: appointment scheduling, clinical advisement, health education, and other advisory services.  Call: [phone] (toll free)               Medications           Message regarding Medications     Verify the changes and/or additions to your medication regime listed below are the same as discussed with your clinician today.  If any of these changes or additions are incorrect, please notify your healthcare provider.        START taking these NEW medications        Refills    furosemide (LASIX) 20 MG tablet 11    Sig: Take 1 tablet (20 mg total) by mouth daily as needed.    Class: Normal    Route: Oral      STOP taking these medications     UNABLE TO FIND Med Name: Nicotine Lozenge           Verify that the below  "list of medications is an accurate representation of the medications you are currently taking.  If none reported, the list may be blank. If incorrect, please contact your healthcare provider. Carry this list with you in case of emergency.           Current Medications     albuterol 90 mcg/actuation inhaler Inhale 2 puffs into the lungs every 6 (six) hours as needed for Wheezing or Shortness of Breath.    aspirin (ECOTRIN) 81 MG EC tablet Take 81 mg by mouth once daily.    atorvastatin (LIPITOR) 20 MG tablet Take 1 tablet (20 mg total) by mouth once daily.    benazepril (LOTENSIN) 10 MG tablet Take 1 tablet (10 mg total) by mouth 2 (two) times daily.    buPROPion (WELLBUTRIN SR) 150 MG TBSR 12 hr tablet Take 1 tablet (150 mg total) by mouth 2 (two) times daily.    carvedilol (COREG) 12.5 MG tablet Take 1 tablet (12.5 mg total) by mouth 2 (two) times daily with meals.    clonazePAM (KLONOPIN) 1 MG tablet TAKE ONE TABLET BY MOUTH TWICE DAILY    ferrous gluconate (FERGON) 325 MG Tab Take 1 tablet (325 mg total) by mouth daily with breakfast.    hydrochlorothiazide (MICROZIDE) 12.5 mg capsule Take 1 capsule (12.5 mg total) by mouth once daily.    krill-om3-dha-epa-om6-lip-astx 1,500-165-67.5 mg Cap Take 1 capsule by mouth.    multivitamin with minerals tablet Take 1 tablet by mouth.    omeprazole (PRILOSEC) 40 MG capsule Take 40 mg by mouth every morning.     zolpidem (AMBIEN) 10 mg Tab TAKE ONE TABLET BY MOUTH AT BEDTIME AS NEEDED    furosemide (LASIX) 20 MG tablet Take 1 tablet (20 mg total) by mouth daily as needed.           Clinical Reference Information           Your Vitals Were     BP Pulse Height Weight BMI    145/87 (BP Location: Left arm, Patient Position: Sitting, BP Method: Automatic) 68 6' 1" (1.854 m) 78.3 kg (172 lb 9.9 oz) 22.77 kg/m2      Blood Pressure          Most Recent Value    BP  (!)  145/87      Allergies as of 4/18/2017     No Known Drug Allergies      Immunizations Administered on Date of " Encounter - 4/18/2017     None      Orders Placed During Today's Visit     Future Labs/Procedures Expected by Expires    2D Echo w/ Color Flow Doppler  As directed 4/18/2018      Instructions    Hold hydrocholorothiazide for the two days that you take lasix.  Weigh yourself daily.  Reduce sodium intake       Language Assistance Services     ATTENTION: Language assistance services are available, free of charge. Please call [phone].      ATENCIÓN: Si habla español, tiene a tavarez disposición servicios gratuitos de asistencia lingüística. Llame al [phone].     CHÚ Ý: N?u b?n nói Ti?ng Vi?t, có các d?ch v? h? tr? ngôn ng? mi?n phí dành cho b?n. G?i s? [phone].         [facility] complies with applicable Federal civil rights laws and does not discriminate on the basis of race, color, national origin, age, disability, or sex.

## 2017-04-18 NOTE — PROGRESS NOTES
Subjective:    Patient ID:  Ilana Carr is a 66 y.o. male who presents for follow-up of Follow-up      HPI: Mr. Ilana Carr presents to the clinic for follow up of CAD, CABG, ICMP, (EF 40-45% by echo 8/2016), HTN, HLP. he stated that he is having episodes of SOB that occur mostly at night when getting ready for bed. He stated that he notices it after he brushes his teeth and walks to bed. He is unable to lay down until SOB resolves. It does resolve within a few minutes. He denied any chest discomfort or palpitations. He stated that he is able to walk during the day without provoking the same level of SOB. He does get a little short-winded when walking, but this is chronic and unchanged from the past.   He has been using albuterol inhaler for SOB, but he doesn't think that it helps. He stated that time and rest for several minutes does help.    Echo 8/26/16: CONCLUSIONS     1 - Concentric hypertrophy.     2 - Mildly to moderately depressed left ventricular systolic function (EF 40%).     3 - Normal left ventricular diastolic function.     4 - Normal right ventricular systolic function .     5 - The estimated PA systolic pressure is 34 mmHg.     6 - Trivial mitral regurgitation.     7 - Increased central venous pressure.     8 - WMA:  following segments were severely hypokinetic: mid inferior wall. Global left ventricular systolic function appears mildly to moderately depressed. Visually estimated ejection fraction is 40-45%    Pharm NM stress test 8/3/16: Impression: ABNORMAL MYOCARDIAL PERFUSION  1. The perfusion scan is free of evidence for myocardial ischemia.   2. There is moderate intensity fixed defect in the inferoapical wall of the left ventricle, consistent with myocardial injury, and a moderate to large size fixed defect of moderate to severe intensity that extends from the base to the apical inferior wall of the left ventricle, consistent with myocardial injury.   3. There is abnormal wall motion at  rest showing hypokinesis of the inferior wall of the left ventricle, and moderate hypokinesis of the  wall of the left ventricle.   4. There is resting LV dysfunction with a reduced ejection fraction of 26 %.   5. The ventricular volumes are normal at rest and stress.   6. The extracardiac distribution of radioactivity is normal.     Medications: he is not missing any doses.  Sodium: he does add some salt to foods at the table, but not when cooking.  he is not  reading labels for sodium content.   Exercise: he walks  Tobacco:Fomer smoker; uses nicotine lozenges about twice a week.. no alcohol use     Weight: 78.3 kg (172 lb 9.9 oz) he states that his daily weights has been stable  Wt Readings from Last 3 Encounters:   04/18/17 78.3 kg (172 lb 9.9 oz)   03/08/17 78.8 kg (173 lb 11.6 oz)   01/13/17 78.1 kg (172 lb 4.6 oz)     BP log: None.    Review of Systems   Constitution: Negative for chills, decreased appetite, diaphoresis, fever, weakness, malaise/fatigue, night sweats, weight gain and weight loss.   HENT: Negative for congestion.    Cardiovascular: Negative for chest pain, claudication, cyanosis, irregular heartbeat, leg swelling, near-syncope, orthopnea, palpitations, paroxysmal nocturnal dyspnea and syncope.   Respiratory: Positive for shortness of breath. Negative for cough, hemoptysis, sputum production and wheezing.    Hematologic/Lymphatic: Negative for adenopathy and bleeding problem. Does not bruise/bleed easily.   Skin: Negative for color change and nail changes.   Gastrointestinal: Negative for bloating, abdominal pain, change in bowel habit, heartburn, hematochezia, melena, nausea and vomiting.   Genitourinary: Negative for hematuria.   Neurological: Negative for dizziness and light-headedness.   Psychiatric/Behavioral: Negative for altered mental status.       Objective:   Physical Exam   Constitutional: He is oriented to person, place, and time. He appears well-developed and well-nourished. No  distress.   HENT:   Head: Normocephalic and atraumatic.   Eyes: Conjunctivae are normal. No scleral icterus.   Neck: Neck supple. No JVD present. No tracheal deviation present. No thyromegaly present.   Cardiovascular: Normal rate, regular rhythm and intact distal pulses.  Exam reveals gallop and S3. Exam reveals no friction rub.    No murmur heard.  Pulmonary/Chest: Effort normal and breath sounds normal. No respiratory distress. He has no wheezes. He has no rales. He exhibits no tenderness.   Abdominal: Soft. Bowel sounds are normal. He exhibits no distension and no mass. There is no tenderness. There is no rebound and no guarding.   Musculoskeletal: Normal range of motion. He exhibits no edema.   Lymphadenopathy:     He has no cervical adenopathy.   Neurological: He is alert and oriented to person, place, and time.   Skin: Skin is warm and dry. No rash noted. He is not diaphoretic. No erythema. No pallor.   Pink   Psychiatric: He has a normal mood and affect.   Results for TOMA MENDES (MRN [id number]) as of 4/18/2017 08:21   Ref. Range 11/28/2016 09:55 2/24/2017 11:10   Sodium Latest Ref Range: 136 - 145 mmol/L  144   Potassium Latest Ref Range: 3.5 - 5.1 mmol/L  5.3 (H)   Chloride Latest Ref Range: 95 - 110 mmol/L  106   CO2 Latest Ref Range: 23 - 29 mmol/L  26   Anion Gap Latest Ref Range: 8 - 16 mmol/L  12   BUN, Bld Latest Ref Range: 8 - 23 mg/dL  21   Creatinine Latest Ref Range: 0.5 - 1.4 mg/dL  1.1   eGFR if non African American Latest Ref Range: >60 mL/min/1.73 m^2  >60.0   eGFR if African American Latest Ref Range: >60 mL/min/1.73 m^2  >60.0   Glucose Latest Ref Range: 70 - 110 mg/dL  119 (H)   Calcium Latest Ref Range: 8.7 - 10.5 mg/dL  9.9   Magnesium Latest Ref Range: 1.6 - 2.6 mg/dL  2.0   Triglycerides Latest Ref Range: 30 - 150 mg/dL 73    Cholesterol Latest Ref Range: 120 - 199 mg/dL 111 (L)    HDL Latest Ref Range: 40 - 75 mg/dL 39 (L)    LDL Cholesterol Latest Ref Range: 63.0 - 159.0 mg/dL 57.4  (L)    Total Cholesterol/HDL Ratio Latest Ref Range: 2.0 - 5.0  2.8    TSH Latest Ref Range: 0.400 - 4.000 uIU/mL 2.061    Non HDL 72.      Assessment:      1. SOB (shortness of breath)    2. Coronary artery disease involving native coronary artery of native heart without angina pectoris    3. Hx of CABG    4. Ischemic cardiomyopathy    5. Peripheral arterial disease    6. Essential hypertension    7. Hyperlipidemia, unspecified hyperlipidemia type        Plan:   Lasix 20 mg QD for two days.  Hold HCTZ for two days.  Echocardiogram complete.  Sodium restriction.  Daily weights.  50-pack year of smoking-consider pulmonary consult if no improvement or worsening of heart function.  Follow up after testing complete.

## 2017-05-01 ENCOUNTER — CLINICAL SUPPORT (OUTPATIENT)
Dept: CARDIOLOGY | Facility: CLINIC | Age: 67
End: 2017-05-01
Payer: MEDICARE

## 2017-05-01 DIAGNOSIS — R06.02 SOB (SHORTNESS OF BREATH): ICD-10-CM

## 2017-05-01 LAB
DIASTOLIC DYSFUNCTION: NO
ESTIMATED PA SYSTOLIC PRESSURE: 19.48
RETIRED EF AND QEF - SEE NOTES: 55 (ref 55–65)

## 2017-05-01 PROCEDURE — 93306 TTE W/DOPPLER COMPLETE: CPT | Mod: PBBFAC,PO | Performed by: NUCLEAR MEDICINE

## 2017-05-05 ENCOUNTER — PATIENT MESSAGE (OUTPATIENT)
Dept: FAMILY MEDICINE | Facility: CLINIC | Age: 67
End: 2017-05-05

## 2017-05-05 RX ORDER — ZOLPIDEM TARTRATE 10 MG/1
10 TABLET ORAL NIGHTLY PRN
Qty: 30 TABLET | Refills: 0 | OUTPATIENT
Start: 2017-05-05

## 2017-05-10 ENCOUNTER — OFFICE VISIT (OUTPATIENT)
Dept: CARDIOLOGY | Facility: CLINIC | Age: 67
End: 2017-05-10
Payer: MEDICARE

## 2017-05-10 VITALS
DIASTOLIC BLOOD PRESSURE: 80 MMHG | WEIGHT: 172.38 LBS | HEIGHT: 73 IN | BODY MASS INDEX: 22.85 KG/M2 | SYSTOLIC BLOOD PRESSURE: 144 MMHG | HEART RATE: 56 BPM

## 2017-05-10 DIAGNOSIS — R06.09 DOE (DYSPNEA ON EXERTION): ICD-10-CM

## 2017-05-10 DIAGNOSIS — I73.9 PERIPHERAL ARTERIAL DISEASE: ICD-10-CM

## 2017-05-10 DIAGNOSIS — I25.5 ISCHEMIC CARDIOMYOPATHY: ICD-10-CM

## 2017-05-10 DIAGNOSIS — I25.10 CORONARY ARTERY DISEASE INVOLVING NATIVE CORONARY ARTERY OF NATIVE HEART WITHOUT ANGINA PECTORIS: ICD-10-CM

## 2017-05-10 DIAGNOSIS — E78.5 DYSLIPIDEMIA: ICD-10-CM

## 2017-05-10 DIAGNOSIS — Z95.1 HX OF CABG: ICD-10-CM

## 2017-05-10 DIAGNOSIS — Z71.2 ENCOUNTER TO DISCUSS TEST RESULTS: Primary | ICD-10-CM

## 2017-05-10 DIAGNOSIS — I10 ESSENTIAL HYPERTENSION: ICD-10-CM

## 2017-05-10 PROCEDURE — 99213 OFFICE O/P EST LOW 20 MIN: CPT | Mod: PBBFAC,PO | Performed by: NURSE PRACTITIONER

## 2017-05-10 PROCEDURE — 99213 OFFICE O/P EST LOW 20 MIN: CPT | Mod: S$PBB,,, | Performed by: NURSE PRACTITIONER

## 2017-05-10 PROCEDURE — 99999 PR PBB SHADOW E&M-EST. PATIENT-LVL III: CPT | Mod: PBBFAC,,, | Performed by: NURSE PRACTITIONER

## 2017-05-10 RX ORDER — BENAZEPRIL HYDROCHLORIDE 20 MG/1
20 TABLET ORAL 2 TIMES DAILY
Qty: 180 TABLET | Refills: 3 | Status: SHIPPED | OUTPATIENT
Start: 2017-05-10 | End: 2017-10-04 | Stop reason: DRUGHIGH

## 2017-05-10 NOTE — PROGRESS NOTES
Subjective:    Patient ID:  Ilana Carr is a 66 y.o. male who presents for follow-up of Follow-up      HPI: Mr. Ilana Carr presents to the clinic for follow up of echocardiogram results and SOB. he stated that he did not notice an improvement in SOB while taking lasix. He did void a lot, but he was still SOB. He stated that he has CHAHAL some days, but not others. He can't explain it. He denied any chest pain, lightheadedness, dizziness, or near syncope. He stated that [facility] and he was told that his heart function was 30%.    Hx: CAD, CABG, ICMP, (EF 40-45% by echo 8/2016), HTN, HLP    Medications: he is not missing any doses.  Sodium: he does add small amount of salt to foods at the table;  he is not reading labels for sodium content.   Exercise: he is walking 1.50-2.0 miles a day, three times a week with his wife. She has not been able to walk for the past week because her brother has been sick. So Mr. Carr has not walked in over one week. He stated that when his CHAHAL is bothering him, he does not walk.  Tobacco:Former smoker. no alcohol use    Echo 5/1/17: CONCLUSIONS     1 - Concentric hypertrophy.     2 - No wall motion abnormalities.     3 - Normal left ventricular systolic function (EF 55-60%).     4 - Normal left ventricular diastolic function.     5 - Normal right ventricular systolic function .     6 - The estimated PA systolic pressure is 19 mmHg.     7 - Aortic sclerosis with mild restriction of leaflet mobility.    Echo 8/26/16: CONCLUSIONS     1 - Concentric hypertrophy.     2 - Mildly to moderately depressed left ventricular systolic function (EF 40%).     3 - Normal left ventricular diastolic function.     4 - Normal right ventricular systolic function .     5 - The estimated PA systolic pressure is 34 mmHg.     6 - Trivial mitral regurgitation.     7 - Increased central venous pressure.     8 - WMA:  following segments were severely hypokinetic: mid inferior wall.  Global left ventricular systolic function appears mildly to moderately depressed. Visually estimated ejection fraction is 40-45%     Pharm NM stress test 8/3/16: Impression: ABNORMAL MYOCARDIAL PERFUSION  1. The perfusion scan is free of evidence for myocardial ischemia.   2. There is moderate intensity fixed defect in the inferoapical wall of the left ventricle, consistent with myocardial injury, and a moderate to large size fixed defect of moderate to severe intensity that extends from the base to the apical inferior wall of the left ventricle, consistent with myocardial injury.   3. There is abnormal wall motion at rest showing hypokinesis of the inferior wall of the left ventricle, and moderate hypokinesis of the  wall of the left ventricle.   4. There is resting LV dysfunction with a reduced ejection fraction of 26 %.   5. The ventricular volumes are normal at rest and stress.   6. The extracardiac distribution of radioactivity is normal.      Weight: 78.2 kg (172 lb 6.4 oz) he states that his daily weights has been stable  Wt Readings from Last 3 Encounters:   05/10/17 78.2 kg (172 lb 6.4 oz)   04/18/17 78.3 kg (172 lb 9.9 oz)   03/08/17 78.8 kg (173 lb 11.6 oz)     BP log: Does not routinely monitor his BP.    Review of Systems   Constitution: Negative for chills, decreased appetite, diaphoresis, fever, weakness, night sweats, weight gain and weight loss.   HENT: Negative for congestion.    Cardiovascular: Positive for dyspnea on exertion (not every day or every time he exerts himself. Some days, he gets SOB walking across a room; other days he can walk two miles without SOB.). Negative for chest pain, claudication, cyanosis, irregular heartbeat, leg swelling, near-syncope, orthopnea, palpitations, paroxysmal nocturnal dyspnea and syncope.   Respiratory: Negative for cough, hemoptysis, shortness of breath (none at rest), sputum production and wheezing.    Hematologic/Lymphatic: Negative for adenopathy and  bleeding problem. Does not bruise/bleed easily.   Skin: Negative for color change and nail changes.   Gastrointestinal: Negative for bloating, abdominal pain, change in bowel habit, heartburn, hematochezia, melena, nausea and vomiting.   Genitourinary: Negative for hematuria.   Neurological: Negative for dizziness and light-headedness.   Psychiatric/Behavioral: Negative for altered mental status.       Objective:   Physical Exam   Constitutional: He is oriented to person, place, and time. He appears well-developed and well-nourished. No distress.   HENT:   Head: Normocephalic and atraumatic.   Eyes: Conjunctivae are normal. No scleral icterus.   Neck: Neck supple. No JVD present. No tracheal deviation present. No thyromegaly present.   Cardiovascular: Normal rate, regular rhythm, normal heart sounds and intact distal pulses.  Exam reveals no gallop and no friction rub.    No murmur heard.  Split S2.   Pulmonary/Chest: Effort normal and breath sounds normal. No respiratory distress. He has no wheezes. He has no rales. He exhibits no tenderness.   Abdominal: Soft. Bowel sounds are normal. He exhibits no distension and no mass. There is no tenderness. There is no rebound and no guarding.   Musculoskeletal: Normal range of motion. He exhibits no edema.   Lymphadenopathy:     He has no cervical adenopathy.   Neurological: He is alert and oriented to person, place, and time.   Skin: Skin is warm and dry. No rash noted. He is not diaphoretic. No erythema. No pallor.   Pink   Psychiatric: He has a normal mood and affect.   Results for TOMA MENDES (MRN [id number]) as of 5/10/2017 10:17   Ref. Range 11/28/2016 09:55   Triglycerides Latest Ref Range: 30 - 150 mg/dL 73   Cholesterol Latest Ref Range: 120 - 199 mg/dL 111 (L)   HDL Latest Ref Range: 40 - 75 mg/dL 39 (L)   LDL Cholesterol Latest Ref Range: 63.0 - 159.0 mg/dL 57.4 (L)   Total Cholesterol/HDL Ratio Latest Ref Range: 2.0 - 5.0  2.8   Vitamin B-12 Latest Ref Range:  210 - 950 pg/mL 603   TSH Latest Ref Range: 0.400 - 4.000 uIU/mL 2.061    Non HDL 72.    Assessment:      1. Encounter to discuss test results    2. Coronary artery disease involving native coronary artery of native heart without angina pectoris    3. Hx of CABG    4. Ischemic cardiomyopathy    5. Peripheral arterial disease    6. Essential hypertension    7. Dyslipidemia        Plan:   There is a discrepancy between our echo report and Mr. Carr's [facility] with heart [facility]%. [facility].  Increase benazepril for BP lowering. If he does still have CMP, he needs lower BP. Heart rate is fine.  Reviewed rationale for increasing benazepril with Mr. Carr and he verbalized his understanding of this.  Ambulatory referral to pulmonology. He prefers to see someone in [place]. Explained rationale for pulmonary referral.  Continue carvedilol, ASA lipitor, krill oil, and HCTZ as directed.  Avoid tobacco relapse. He quit in September 2016.  Sodium restriction.  Monitor daily weight.  Monitor BP at home. Bring log to next appointment.  Follow up in 6 weeks or call sooner for any problems.

## 2017-05-10 NOTE — MR AVS SNAPSHOT
[facility]  [address] [facility]  [place] [address]  Phone: [phone]  Fax: [phone]                  Ilana Carr   5/10/2017 10:20 AM   Office Visit    Description:  Male : 1950   Provider:  Adelaida Esparza NP   Department:  [facility]           Reason for Visit     Follow-up           Diagnoses this Visit        Comments    Encounter to discuss test results    -  Primary     Coronary artery disease involving native coronary artery of native heart without angina pectoris         Hx of CABG         Ischemic cardiomyopathy         Peripheral arterial disease         Essential hypertension         Dyslipidemia         CHAHAL (dyspnea on exertion)                To Do List           Future Appointments        Provider Department Dept Phone    5/15/2017 8:00 AM Long Acosta MD [facility] [phone]    2017 10:20 AM Adelaida Esparza NP [facility] [phone]      Goals (5 Years of Data)     None      Follow-Up and Disposition     Return in about 6 weeks (around 2017).    Follow-up and Disposition History       These Medications        Disp Refills Start End    benazepril (LOTENSIN) 20 MG tablet 180 tablet 3 5/10/2017 5/10/2018    Take 1 tablet (20 mg total) by mouth 2 (two) times daily. - Oral    Pharmacy: [facility] - [address] Ph #: [phone]         [facility]     [facility] Care [facility] -  Assistance  Unless otherwise directed by your provider, please contact [facility], our nurse care line that is available for  assistance.     Registered nurses in the [facility] provide: appointment scheduling, clinical advisement, health education, and other advisory services.  Call: [phone] (toll free)               Medications           Message regarding Medications     Verify the changes and/or additions to your medication regime listed below are the same as  discussed with your clinician today.  If any of these changes or additions are incorrect, please notify your healthcare provider.        CHANGE how you are taking these medications     Start Taking Instead of    benazepril (LOTENSIN) 20 MG tablet benazepril (LOTENSIN) 10 MG tablet    Dosage:  Take 1 tablet (20 mg total) by mouth 2 (two) times daily. Dosage:  Take 1 tablet (10 mg total) by mouth 2 (two) times daily.    Reason for Change:  Reorder       STOP taking these medications     furosemide (LASIX) 20 MG tablet Take 1 tablet (20 mg total) by mouth daily as needed.    omeprazole (PRILOSEC) 40 MG capsule Take 40 mg by mouth every morning.            Verify that the below list of medications is an accurate representation of the medications you are currently taking.  If none reported, the list may be blank. If incorrect, please contact your healthcare provider. Carry this list with you in case of emergency.           Current Medications     albuterol 90 mcg/actuation inhaler Inhale 2 puffs into the lungs every 6 (six) hours as needed for Wheezing or Shortness of Breath.    aspirin (ECOTRIN) 81 MG EC tablet Take 81 mg by mouth once daily.    atorvastatin (LIPITOR) 20 MG tablet Take 1 tablet (20 mg total) by mouth once daily.    benazepril (LOTENSIN) 20 MG tablet Take 1 tablet (20 mg total) by mouth 2 (two) times daily.    buPROPion (WELLBUTRIN SR) 150 MG TBSR 12 hr tablet Take 1 tablet (150 mg total) by mouth 2 (two) times daily.    carvedilol (COREG) 12.5 MG tablet Take 1 tablet (12.5 mg total) by mouth 2 (two) times daily with meals.    clonazePAM (KLONOPIN) 1 MG tablet TAKE ONE TABLET BY MOUTH TWICE DAILY    hydrochlorothiazide (MICROZIDE) 12.5 mg capsule Take 1 capsule (12.5 mg total) by mouth once daily.    krill-om3-dha-epa-om6-lip-astx 1,500-165-67.5 mg Cap Take 1 capsule by mouth.    multivitamin with minerals tablet Take 1 tablet by mouth.    zolpidem (AMBIEN) 10 mg Tab TAKE ONE TABLET BY MOUTH AT BEDTIME  "AS NEEDED    ferrous gluconate (FERGON) 325 MG Tab Take 1 tablet (325 mg total) by mouth daily with breakfast.           Clinical Reference Information           Your Vitals Were     BP Pulse Height Weight BMI    144/80 (BP Location: Left arm, Patient Position: Sitting, BP Method: Automatic) 56 6' 1" (1.854 m) 78.2 kg (172 lb 6.4 oz) 22.75 kg/m2      Blood Pressure          Most Recent Value    BP  (!)  144/80      Allergies as of 5/10/2017     No Known Drug Allergies      Immunizations Administered on Date of Encounter - 5/10/2017     None      Orders Placed During Today's Visit      Normal Orders This Visit    Ambulatory referral to Pulmonology       Language Assistance Services     ATTENTION: Language assistance services are available, free of charge. Please call [phone].      ATENCIÓN: Si russella selina, tiene a tavarez disposición servicios gratuitos de asistencia lingüística. Llame al [phone].     AWA Ý: N?u b?n nói Ti?ng Vi?t, có các d?ch v? h? tr? ngôn ng? mi?n phí dành cho b?n. G?i s? [phone].         [facility] complies with applicable Federal civil rights laws and does not discriminate on the basis of race, color, national origin, age, disability, or sex.        "

## 2017-05-15 ENCOUNTER — OFFICE VISIT (OUTPATIENT)
Dept: FAMILY MEDICINE | Facility: CLINIC | Age: 67
End: 2017-05-15
Payer: MEDICARE

## 2017-05-15 VITALS
HEART RATE: 55 BPM | SYSTOLIC BLOOD PRESSURE: 136 MMHG | WEIGHT: 169.88 LBS | TEMPERATURE: 98 F | DIASTOLIC BLOOD PRESSURE: 78 MMHG | HEIGHT: 73 IN | BODY MASS INDEX: 22.52 KG/M2

## 2017-05-15 DIAGNOSIS — J43.9 PULMONARY EMPHYSEMA, UNSPECIFIED EMPHYSEMA TYPE: ICD-10-CM

## 2017-05-15 DIAGNOSIS — F41.9 ANXIETY: Primary | ICD-10-CM

## 2017-05-15 DIAGNOSIS — J44.9 CHRONIC OBSTRUCTIVE PULMONARY DISEASE, UNSPECIFIED COPD TYPE: ICD-10-CM

## 2017-05-15 DIAGNOSIS — I25.10 CORONARY ARTERY DISEASE INVOLVING NATIVE CORONARY ARTERY OF NATIVE HEART WITHOUT ANGINA PECTORIS: ICD-10-CM

## 2017-05-15 PROCEDURE — 99214 OFFICE O/P EST MOD 30 MIN: CPT | Mod: S$PBB,,, | Performed by: FAMILY MEDICINE

## 2017-05-15 PROCEDURE — 99999 PR PBB SHADOW E&M-EST. PATIENT-LVL IV: CPT | Mod: PBBFAC,,, | Performed by: FAMILY MEDICINE

## 2017-05-15 PROCEDURE — 99214 OFFICE O/P EST MOD 30 MIN: CPT | Mod: PBBFAC,PO | Performed by: FAMILY MEDICINE

## 2017-05-15 NOTE — MR AVS SNAPSHOT
[facility]  [address] [place] [address]  Phone: [phone]  Fax: [phone]                  Ilana Carr   5/15/2017 8:00 AM   Office Visit    Description:  Male : 1950   Provider:  Long Acosta MD   Department:  [facility]           Reason for Visit     Medication Refill           Diagnoses this Visit        Comments    Anxiety    -  Primary     Chronic obstructive pulmonary disease, unspecified COPD type         Pulmonary emphysema, unspecified emphysema type         Coronary artery disease involving native coronary artery of native heart without angina pectoris                To Do List           Future Appointments        Provider Department Dept Phone    2017 10:20 AM Adelaida Esparza NP [facility] [phone]      Goals (5 Years of Data)     None      [facility]     [facility] Nurse Care Line -  Assistance  Unless otherwise directed by your provider, please contact [facility], our nurse care line that is available for  assistance.     Registered nurses in the [facility] On Call Center provide: appointment scheduling, clinical advisement, health education, and other advisory services.  Call: [phone] (toll free)               Medications           Message regarding Medications     Verify the changes and/or additions to your medication regime listed below are the same as discussed with your clinician today.  If any of these changes or additions are incorrect, please notify your healthcare provider.        STOP taking these medications     zolpidem (AMBIEN) 10 mg Tab TAKE ONE TABLET BY MOUTH AT BEDTIME AS NEEDED           Verify that the below list of medications is an accurate representation of the medications you are currently taking.  If none reported, the list may be blank. If incorrect, please contact your healthcare provider. Carry this list with you in case of emergency.           Current Medications   "   albuterol 90 mcg/actuation inhaler Inhale 2 puffs into the lungs every 6 (six) hours as needed for Wheezing or Shortness of Breath.    aspirin (ECOTRIN) 81 MG EC tablet Take 81 mg by mouth once daily.    atorvastatin (LIPITOR) 20 MG tablet Take 1 tablet (20 mg total) by mouth once daily.    benazepril (LOTENSIN) 20 MG tablet Take 1 tablet (20 mg total) by mouth 2 (two) times daily.    carvedilol (COREG) 12.5 MG tablet Take 1 tablet (12.5 mg total) by mouth 2 (two) times daily with meals.    clonazePAM (KLONOPIN) 1 MG tablet TAKE ONE TABLET BY MOUTH TWICE DAILY    ferrous gluconate (FERGON) 325 MG Tab Take 1 tablet (325 mg total) by mouth daily with breakfast.    hydrochlorothiazide (MICROZIDE) 12.5 mg capsule Take 1 capsule (12.5 mg total) by mouth once daily.    multivitamin with minerals tablet Take 1 tablet by mouth.    buPROPion (WELLBUTRIN SR) 150 MG TBSR 12 hr tablet Take 1 tablet (150 mg total) by mouth 2 (two) times daily.    krill-om3-dha-epa-om6-lip-astx 1,500-165-67.5 mg Cap Take 1 capsule by mouth.           Clinical Reference Information           Your Vitals Were     BP Pulse Temp Height Weight BMI    136/78 55 97.9 °F (36.6 °C) 6' 1" (1.854 m) 77 kg (169 lb 13.8 oz) 22.41 kg/m2      Blood Pressure          Most Recent Value    BP  136/78      Allergies as of 5/15/2017     No Known Drug Allergies      Immunizations Administered on Date of Encounter - 5/15/2017     None      Orders Placed During Today's Visit      Normal Orders This Visit    Ambulatory referral to Pulmonology       Language Assistance Services     ATTENTION: Language assistance services are available, free of charge. Please call [phone].      ATENCIÓN: Si habla selina, tiene a tavarez disposición servicios gratuitos de asistencia lingüística. Llame al [phone].     CHÚ Ý: N?u b?n nói Ti?ng Vi?t, có các d?ch v? h? tr? ngôn ng? mi?n phí dành cho b?n. G?i s? [phone].         [facility] complies with " applicable Federal civil rights laws and does not discriminate on the basis of race, color, national origin, age, disability, or sex.

## 2017-05-15 NOTE — PROGRESS NOTES
Patient presents for follow-up.  Chronic clonazepam for years related to anxiety stable.  Chronic Ambien as well-discussed discontinuation.  Continuing to recover after  coronary artery bypass surgery due to coronary artery disease.  [facility] however [facility] echocardiogram [facility] with normal ejection fraction.  He does get some intermittent dyspnea on exertion and is being referred to pulmonary by his cardiologist.  Has not scheduled appointment yet, wants to go to [place] for this..    He did quit smoking previously.  Compliant with his antidepressive medication feels like doing okay.  Previously diagnosed COPD/emphysema.  Off chronic opioids..    Previously was on chronic narcotics through pain management, but had gotten off prior to surgery.  Recent laboratory reviewed.    Past Medical History:  Past Medical History:   Diagnosis Date    Alcoholism in remission     Anxiety     CAD (coronary artery disease) 9/2/2016    Chronic back pain     pain mgt    Chronic hepatitis C without hepatic coma 1/8/2016    Colon polyps     COPD (chronic obstructive pulmonary disease)     Depressed left ventricular systolic function 9/6/2016    Depression     Diverticulosis     DJD (degenerative joint disease) of hip     Emphysema of lung     Esophageal ulcer with bleeding     GERD (gastroesophageal reflux disease)     Hepatitis C     completed Interferon therapy    Hiatal hernia     Hyperlipidemia with target LDL less than 100 1/7/2015    Hypertension     Hypertriglyceridemia     Osteomyelitis of right foot     Peripheral arterial disease     Spells     last Sept 2013, not sure if it was TIA v seizure    Squamous cell skin cancer     Testosterone deficiency     Tubular adenoma of colon      Past Surgical History:   Procedure Laterality Date    CARDIAC CATHETERIZATION      COLONOSCOPY  2008    COLONOSCOPY N/A 1/8/2016    Procedure: COLONOSCOPY;   Surgeon: Amauri Delatorre MD;  Location[facility];  Service: Endoscopy;  Laterality: N/A;    CORONARY ARTERY BYPASS GRAFT  09/09/2016    FOOT SURGERY Right 12/6/2013    fifth metatarsal head resection [Other]    ORIF WRIST FRACTURE      LT    SHOULDER SURGERY      left    TONSILLECTOMY      UPPER GASTROINTESTINAL ENDOSCOPY  2010     Social History     Social History    Marital status:      Spouse name: N/A    Number of children: N/A    Years of education: N/A     Occupational History    Not on file.     Social History Main Topics    Smoking status: Former Smoker     Packs/day: 1.50     Years: 40.00     Types: Cigarettes, Cigars     Quit date: 9/9/2016    Smokeless tobacco: Former User     Quit date: 9/9/2016    Alcohol use No      Comment: quit 30 y- prior alcoholism    Drug use: Yes     Special: Oxycodone    Sexual activity: Not on file     Other Topics Concern    Not on file     Social History Narrative     Family History   Problem Relation Age of Onset    Lung cancer Mother     Hypertension Mother     Hypertension Father     Arrhythmia Brother 60    Colon cancer Neg Hx     Stomach cancer Neg Hx      Review of patient's allergies indicates:   Allergen Reactions    No known drug allergies      Current Outpatient Prescriptions on File Prior to Visit   Medication Sig Dispense Refill    albuterol 90 mcg/actuation inhaler Inhale 2 puffs into the lungs every 6 (six) hours as needed for Wheezing or Shortness of Breath. 18 g 5    aspirin (ECOTRIN) 81 MG EC tablet Take 81 mg by mouth once daily.      atorvastatin (LIPITOR) 20 MG tablet Take 1 tablet (20 mg total) by mouth once daily. 90 tablet 2    benazepril (LOTENSIN) 20 MG tablet Take 1 tablet (20 mg total) by mouth 2 (two) times daily. 180 tablet 3    carvedilol (COREG) 12.5 MG tablet Take 1 tablet (12.5 mg total) by mouth 2 (two) times daily with meals. 180 tablet 2    clonazePAM (KLONOPIN) 1 MG tablet TAKE ONE TABLET BY MOUTH TWICE  "DAILY 60 tablet 1    ferrous gluconate (FERGON) 325 MG Tab Take 1 tablet (325 mg total) by mouth daily with breakfast. 90 tablet 0    hydrochlorothiazide (MICROZIDE) 12.5 mg capsule Take 1 capsule (12.5 mg total) by mouth once daily. 90 capsule 3    multivitamin with minerals tablet Take 1 tablet by mouth.      [DISCONTINUED] zolpidem (AMBIEN) 10 mg Tab TAKE ONE TABLET BY MOUTH AT BEDTIME AS NEEDED 30 tablet 1    buPROPion (WELLBUTRIN SR) 150 MG TBSR 12 hr tablet Take 1 tablet (150 mg total) by mouth 2 (two) times daily. 180 tablet 3    krill-om3-dha-epa-om6-lip-astx 1,500-165-67.5 mg Cap Take 1 capsule by mouth.       No current facility-administered medications on file prior to visit.            ROS:  GENERAL: No fever, chills,  or significant weight changes.   CARDIOVASCULAR: Denies PND, orthopnea   ABDOMEN: Appetite fine. Denies diarrhea, abdominal pain, hematemesis or blood in stool.        OBJECTIVE:     Vitals:    05/15/17 0803   BP: 136/78   Pulse: (!) 55   Temp: 97.9 °F (36.6 °C)   Weight: 77 kg (169 lb 13.8 oz)   Height: 6' 1" (1.854 m)     Wt Readings from Last 3 Encounters:   05/15/17 77 kg (169 lb 13.8 oz)   05/10/17 78.2 kg (172 lb 6.4 oz)   04/18/17 78.3 kg (172 lb 9.9 oz)     APPEARANCE: Well nourished, well developed, in no acute distress.    HEAD: Normocephalic.  Atraumatic.  No sinus tenderness.  EYES:   Right eye: Pupil reactive.  Conjunctiva clear.    Left eye: Pupil reactive.  Conjunctiva clear.     EOMI.    EARS: TM's intact. Light reflex normal. No retraction or perforation.    NOSE:  clear.  MOUTH & THROAT:  No pharyngeal erythema or exudate. No lesions.  NECK: Supple. No bruits.  No JVD.  No cervical lymphadenopathy.  No thyromegaly.    CHEST: Breath sounds clear bilaterally.  Normal respiratory effort.  Healing surgical scar  CARDIOVASCULAR: Normal rate.  Regular rhythm.  No murmurs.  No rub.  No gallops.  ABDOMEN: Bowel sounds normal.  Soft.  No tenderness.  No " organomegaly.  PERIPHERAL VASCULAR: No cyanosis.  No clubbing.  No edema.  NEUROLOGIC: No focal findings.  MENTAL STATUS: Alert.  Oriented x 3.      Ilana was seen today for medication refill.    Diagnoses and all orders for this visit:    Anxiety    Chronic obstructive pulmonary disease, unspecified COPD type  -     Ambulatory referral to Pulmonology    Pulmonary emphysema, unspecified emphysema type  -     Ambulatory referral to Pulmonology    Coronary artery disease involving native coronary artery of native heart without angina pectoris      Discontinue Ambien.  May continue clonazepam as I suspect would be very difficult to get him off of this and appears stable.   Keep follow-up cardiology and pulmonary.    Maintain abstinence from smoking.

## 2017-05-24 ENCOUNTER — TELEPHONE (OUTPATIENT)
Dept: CARDIOLOGY | Facility: CLINIC | Age: 67
End: 2017-05-24

## 2017-05-24 NOTE — TELEPHONE ENCOUNTER
Patient notified of referral for [facility] per his request in [facility].[phone].Appointment made with Dr Schreiber for 6/19/17 at 10:30am.Patient notified.

## 2017-06-06 ENCOUNTER — PATIENT MESSAGE (OUTPATIENT)
Dept: CARDIOLOGY | Facility: CLINIC | Age: 67
End: 2017-06-06

## 2017-06-14 RX ORDER — CLONAZEPAM 1 MG/1
TABLET ORAL
Qty: 60 TABLET | Refills: 4 | OUTPATIENT
Start: 2017-06-14

## 2017-06-22 ENCOUNTER — TELEPHONE (OUTPATIENT)
Dept: FAMILY MEDICINE | Facility: CLINIC | Age: 67
End: 2017-06-22

## 2017-06-22 NOTE — TELEPHONE ENCOUNTER
----- Message from Cailin Clark sent at 6/22/2017  2:16 PM CDT -----  Contact: Natacha Manzanares [place]  States the impression for a CT is needed, please fax to [phone], can be reached at [phone]///[id number]

## 2017-06-28 ENCOUNTER — PATIENT MESSAGE (OUTPATIENT)
Dept: CARDIOLOGY | Facility: CLINIC | Age: 67
End: 2017-06-28

## 2017-06-28 ENCOUNTER — OFFICE VISIT (OUTPATIENT)
Dept: CARDIOLOGY | Facility: CLINIC | Age: 67
End: 2017-06-28
Payer: MEDICARE

## 2017-06-28 VITALS
HEIGHT: 73 IN | HEART RATE: 72 BPM | DIASTOLIC BLOOD PRESSURE: 76 MMHG | WEIGHT: 177.13 LBS | BODY MASS INDEX: 23.47 KG/M2 | SYSTOLIC BLOOD PRESSURE: 139 MMHG

## 2017-06-28 DIAGNOSIS — I10 ESSENTIAL HYPERTENSION: ICD-10-CM

## 2017-06-28 DIAGNOSIS — Z95.1 HX OF CABG: ICD-10-CM

## 2017-06-28 DIAGNOSIS — E78.5 HYPERLIPIDEMIA, UNSPECIFIED HYPERLIPIDEMIA TYPE: ICD-10-CM

## 2017-06-28 DIAGNOSIS — I73.9 PERIPHERAL ARTERIAL DISEASE: ICD-10-CM

## 2017-06-28 DIAGNOSIS — I25.10 CORONARY ARTERY DISEASE INVOLVING NATIVE CORONARY ARTERY OF NATIVE HEART WITHOUT ANGINA PECTORIS: Primary | ICD-10-CM

## 2017-06-28 PROCEDURE — 99213 OFFICE O/P EST LOW 20 MIN: CPT | Mod: S$PBB,,, | Performed by: NURSE PRACTITIONER

## 2017-06-28 PROCEDURE — 99999 PR PBB SHADOW E&M-EST. PATIENT-LVL IV: CPT | Mod: PBBFAC,,, | Performed by: NURSE PRACTITIONER

## 2017-06-28 PROCEDURE — 1159F MED LIST DOCD IN RCRD: CPT | Mod: ,,, | Performed by: NURSE PRACTITIONER

## 2017-06-28 PROCEDURE — 99214 OFFICE O/P EST MOD 30 MIN: CPT | Mod: PBBFAC,PO | Performed by: NURSE PRACTITIONER

## 2017-06-28 NOTE — PROGRESS NOTES
Subjective:    Patient ID:  Ilana Carr is a 67 y.o. male who presents for follow-up of Follow-up (medicine increase)      HPI: . Ilana Carr presents to the clinic for follow up of CAD, CABG, HTN, HLP and medication increase. he stated that he is doing pretty well; he increased benazepril as directed without any problems. He is monitoring BP at home, but he forgot to bring the log. He saw Dr. Schreiber for pulmonary evaluation and he has several tests ordered. He had agent orange exposure and he was a smoker. He does have emphysema and he started Anora Ellipta, but stated that he won't be able to afford it. He has notified Dr. Schreiber's office about this. He denied any chest pain. He feels like his SOB is improved. He does have CHAHAL that is chronic and recurrent; some days he does not have it, and others, he does.  CABG 9/19/16 by Dr. Rivera.  Denied SHANTI.    Echo 5/1/17: CONCLUSIONS     1 - Concentric hypertrophy.     2 - No wall motion abnormalities.     3 - Normal left ventricular systolic function (EF 55-60%).     4 - Normal left ventricular diastolic function.     5 - Normal right ventricular systolic function .     6 - The estimated PA systolic pressure is 19 mmHg.     Medications: he is not missing any doses.  Sodium: he does add salt to foods,  he is not reading labels for sodium content.   Exercise: he does not; joined health club at [facility]; went for the first time today.  Tobacco:Former smoker. no alcohol use     Weight: 80.3 kg (177 lb 2.2 oz) he states that his daily weights has been stable  Wt Readings from Last 3 Encounters:   06/28/17 80.3 kg (177 lb 2.2 oz)   05/15/17 77 kg (169 lb 13.8 oz)   05/10/17 78.2 kg (172 lb 6.4 oz)     BP log: None. He forgot his log at home.    Review of Systems   Constitution: Negative for chills, decreased appetite, fever, night sweats, weight gain and weight loss.   HENT: Negative for congestion.    Cardiovascular: Positive for dyspnea on exertion. Negative for  chest pain, claudication, cyanosis, irregular heartbeat, leg swelling, near-syncope, orthopnea, palpitations, paroxysmal nocturnal dyspnea and syncope.   Respiratory: Positive for shortness of breath (sometimes.). Negative for cough, hemoptysis, sputum production and wheezing.    Hematologic/Lymphatic: Negative for adenopathy and bleeding problem. Does not bruise/bleed easily.   Skin: Negative for color change and nail changes.   Gastrointestinal: Negative for bloating, abdominal pain, change in bowel habit, heartburn, hematochezia, melena, nausea and vomiting.   Genitourinary: Negative for hematuria.   Neurological: Negative for dizziness and light-headedness.   Psychiatric/Behavioral: Negative for altered mental status.       Objective:   Physical Exam   Constitutional: He is oriented to person, place, and time. He appears well-developed and well-nourished. No distress.   HENT:   Head: Normocephalic and atraumatic.   Eyes: Conjunctivae are normal. No scleral icterus.   Neck: Neck supple. No JVD present. No tracheal deviation present. No thyromegaly present.   Cardiovascular: Normal rate, regular rhythm, normal heart sounds and intact distal pulses.  Exam reveals no gallop and no friction rub.    No murmur heard.  Pulmonary/Chest: Effort normal. No respiratory distress. He has no wheezes. He has no rales. He exhibits no tenderness.   Bibasilar crackles: right more than left.   Abdominal: Soft. Bowel sounds are normal. He exhibits no distension and no mass. There is no tenderness. There is no rebound and no guarding.   Musculoskeletal: Normal range of motion. He exhibits no edema.   Lymphadenopathy:     He has no cervical adenopathy.   Neurological: He is alert and oriented to person, place, and time.   Skin: Skin is warm and dry. No rash noted. He is not diaphoretic. No erythema. No pallor.   Pink   Psychiatric: He has a normal mood and affect.   Results for MENDESTOMA (MRN [id number]) as of 6/28/2017 15:34   Ref.  Range 11/28/2016 09:55   Cholesterol Latest Ref Range: 120 - 199 mg/dL 111 (L)   HDL Latest Ref Range: 40 - 75 mg/dL 39 (L)   LDL Cholesterol Latest Ref Range: 63.0 - 159.0 mg/dL 57.4 (L)   Total Cholesterol/HDL Ratio Latest Ref Range: 2.0 - 5.0  2.8   Triglycerides Latest Ref Range: 30 - 150 mg/dL 73   Vitamin B-12 Latest Ref Range: 210 - 950 pg/mL 603   TSH Latest Ref Range: 0.400 - 4.000 uIU/mL 2.061        Assessment:      1. Coronary artery disease involving native coronary artery of native heart without angina pectoris    2. Hx of CABG    3. Peripheral arterial disease    4. Essential hypertension    5. Hyperlipidemia, unspecified hyperlipidemia type        Plan:     Continue current medications as directed.  Monitor BP at home. Bring log to next appointment.  Labs due in November 2017. (FLP).  Encouraged exercise.  Encouraged continued tobacco cessation.  Follow up in 3 months or call sooner for any problems.

## 2017-07-17 ENCOUNTER — PATIENT MESSAGE (OUTPATIENT)
Dept: FAMILY MEDICINE | Facility: CLINIC | Age: 67
End: 2017-07-17

## 2017-07-18 ENCOUNTER — PATIENT MESSAGE (OUTPATIENT)
Dept: FAMILY MEDICINE | Facility: CLINIC | Age: 67
End: 2017-07-18

## 2017-07-18 RX ORDER — CLONAZEPAM 1 MG/1
1 TABLET ORAL 2 TIMES DAILY
Qty: 60 TABLET | Refills: 2 | Status: SHIPPED | OUTPATIENT
Start: 2017-07-18 | End: 2017-09-12 | Stop reason: SDUPTHER

## 2017-09-12 RX ORDER — CLONAZEPAM 1 MG/1
1 TABLET ORAL 2 TIMES DAILY
Qty: 60 TABLET | Refills: 1 | Status: SHIPPED | OUTPATIENT
Start: 2017-09-12

## 2017-10-04 ENCOUNTER — PATIENT MESSAGE (OUTPATIENT)
Dept: CARDIOLOGY | Facility: CLINIC | Age: 67
End: 2017-10-04

## 2017-10-04 ENCOUNTER — OFFICE VISIT (OUTPATIENT)
Dept: CARDIOLOGY | Facility: CLINIC | Age: 67
End: 2017-10-04
Payer: MEDICARE

## 2017-10-04 VITALS
BODY MASS INDEX: 23.17 KG/M2 | SYSTOLIC BLOOD PRESSURE: 160 MMHG | DIASTOLIC BLOOD PRESSURE: 86 MMHG | HEART RATE: 62 BPM | HEIGHT: 73 IN | WEIGHT: 174.81 LBS

## 2017-10-04 DIAGNOSIS — I10 ESSENTIAL HYPERTENSION: ICD-10-CM

## 2017-10-04 DIAGNOSIS — Z95.1 HX OF CABG: ICD-10-CM

## 2017-10-04 DIAGNOSIS — E78.5 HYPERLIPIDEMIA, UNSPECIFIED HYPERLIPIDEMIA TYPE: ICD-10-CM

## 2017-10-04 DIAGNOSIS — I25.10 CORONARY ARTERY DISEASE INVOLVING NATIVE CORONARY ARTERY OF NATIVE HEART WITHOUT ANGINA PECTORIS: Primary | ICD-10-CM

## 2017-10-04 PROCEDURE — 99999 PR PBB SHADOW E&M-EST. PATIENT-LVL IV: CPT | Mod: PBBFAC,,, | Performed by: NURSE PRACTITIONER

## 2017-10-04 PROCEDURE — 99213 OFFICE O/P EST LOW 20 MIN: CPT | Mod: S$PBB,,, | Performed by: NURSE PRACTITIONER

## 2017-10-04 PROCEDURE — 99214 OFFICE O/P EST MOD 30 MIN: CPT | Mod: PBBFAC,PO | Performed by: NURSE PRACTITIONER

## 2017-10-04 RX ORDER — ZOLPIDEM TARTRATE 10 MG/1
5 TABLET ORAL NIGHTLY PRN
COMMUNITY
End: 2017-11-17 | Stop reason: SDUPTHER

## 2017-10-04 RX ORDER — EPINEPHRINE 0.22MG
100 AEROSOL WITH ADAPTER (ML) INHALATION DAILY
COMMUNITY
End: 2022-09-20 | Stop reason: CLARIF

## 2017-10-04 RX ORDER — BENAZEPRIL HYDROCHLORIDE 40 MG/1
40 TABLET ORAL DAILY
COMMUNITY
End: 2018-04-03 | Stop reason: CLARIF

## 2017-10-04 RX ORDER — BUDESONIDE AND FORMOTEROL FUMARATE DIHYDRATE 80; 4.5 UG/1; UG/1
2 AEROSOL RESPIRATORY (INHALATION) 2 TIMES DAILY
COMMUNITY

## 2017-10-04 RX ORDER — BUPROPION HYDROCHLORIDE 150 MG/1
150 TABLET, EXTENDED RELEASE ORAL EVERY MORNING
COMMUNITY

## 2017-10-04 NOTE — PROGRESS NOTES
Subjective:    Patient ID:  Ilana Carr is a 67 y.o. male who presents for follow-up of Follow-up      HPI: . Ilana Carr presents to the clinic for follow up of CAD, CABG, HTN, HLP and medication increase. he stated that he is doing pretty well; he [facility]. He is monitoring BP at home, but he forgot to bring the log.    He had agent orange exposure and he was a smoker. He does have emphysema and he is taking Symbicort as a substitute for Anora Ellipta, because he can't afford it. He has notified  He denied any chest pain. He feels like his SOB is improved. He stated that he is able to walk a mile without SOB or CHAHAL.   CABG 9/19/16 by Dr. Rivera.  Denied SHANTI.    Echo 5/1/17: CONCLUSIONS     1 - Concentric hypertrophy.     2 - No wall motion abnormalities.     3 - Normal left ventricular systolic function (EF 55-60%).     4 - Normal left ventricular diastolic function.     5 - Normal right ventricular systolic function .     6 - The estimated PA systolic pressure is 19 mmHg.     Medications: he is not missing any doses.  Sodium: he does not add salt to foods,  he is not reading labels for sodium content.   Exercise: he does not; joined health club at \[facility]". He has not gone in a while, but is planning to return.  Tobacco:Former smoker. no alcohol use; had 5 cups of coffee this morning; normally drinks 3 cups/day-each 10-11 ounces/cup.     Weight: 79.3 kg (174 lb 13.2 oz) he states that his daily weights has been stable  Wt Readings from Last 3 Encounters:   10/04/17 79.3 kg (174 lb 13.2 oz)   06/28/17 80.3 kg (177 lb 2.2 oz)   05/15/17 77 kg (169 lb 13.8 oz)     BP log: None. He forgot his log at home. Stated that it runs in the 120s/70s-80s at home.    Review of Systems   Constitution: Negative for chills, decreased appetite, fever, night sweats, weight gain and weight loss.   HENT: Negative for congestion.    Cardiovascular: Negative for  chest pain, claudication, cyanosis, dyspnea on exertion, irregular heartbeat, leg swelling, near-syncope, orthopnea, palpitations, paroxysmal nocturnal dyspnea and syncope.   Respiratory: Negative for cough, hemoptysis, shortness of breath, sputum production and wheezing.    Hematologic/Lymphatic: Negative for adenopathy and bleeding problem. Does not bruise/bleed easily.   Skin: Negative for color change and nail changes.   Gastrointestinal: Negative for bloating, abdominal pain, change in bowel habit, heartburn, hematochezia, melena, nausea and vomiting.   Genitourinary: Negative for hematuria.   Neurological: Negative for dizziness and light-headedness.   Psychiatric/Behavioral: Negative for altered mental status.       Objective:   Physical Exam   Constitutional: He is oriented to person, place, and time. He appears well-developed and well-nourished. No distress.   HENT:   Head: Normocephalic and atraumatic.   Eyes: Conjunctivae are normal. No scleral icterus.   Neck: Neck supple. No JVD present. No tracheal deviation present. No thyromegaly present.   Cardiovascular: Normal rate, regular rhythm, normal heart sounds and intact distal pulses.   Occasional extrasystoles are present. Exam reveals no gallop and no friction rub.    No murmur heard.  Pulmonary/Chest: Effort normal. No respiratory distress. He has no wheezes. He has no rales. He exhibits no tenderness.   Abdominal: Soft. Bowel sounds are normal. He exhibits no distension and no mass. There is no tenderness. There is no rebound and no guarding.   Musculoskeletal: Normal range of motion. He exhibits no edema.   Lymphadenopathy:     He has no cervical adenopathy.   Neurological: He is alert and oriented to person, place, and time.   Skin: Skin is warm and dry. No rash noted. He is not diaphoretic. No erythema. No pallor.   Pink   Psychiatric: He has a normal mood and affect.   Results for TOMA MENDES (MRN [id number]) as of 6/28/2017 15:34   Ref. Range  11/28/2016 09:55   Cholesterol Latest Ref Range: 120 - 199 mg/dL 111 (L)   HDL Latest Ref Range: 40 - 75 mg/dL 39 (L)   LDL Cholesterol Latest Ref Range: 63.0 - 159.0 mg/dL 57.4 (L)   Total Cholesterol/HDL Ratio Latest Ref Range: 2.0 - 5.0  2.8   Triglycerides Latest Ref Range: 30 - 150 mg/dL 73   Vitamin B-12 Latest Ref Range: 210 - 950 pg/mL 603   TSH Latest Ref Range: 0.400 - 4.000 uIU/mL 2.061        Assessment:      1. Coronary artery disease involving native coronary artery of native heart without angina pectoris    2. Hx of CABG    3. Essential hypertension    4. Hyperlipidemia, unspecified hyperlipidemia type        Plan:     Continue current medications as directed.  Monitor BP at home. BP log via portal in 1-2 weeks.  Labs: FLP with phone review.  Encouraged exercise.  Encouraged continued tobacco cessation.  Caffeine in moderation and discussed rationale.  Follow up in 4 months or call sooner for any problems.

## 2017-10-10 ENCOUNTER — TELEPHONE (OUTPATIENT)
Dept: SMOKING CESSATION | Facility: CLINIC | Age: 67
End: 2017-10-10

## 2017-10-11 ENCOUNTER — CLINICAL SUPPORT (OUTPATIENT)
Dept: SMOKING CESSATION | Facility: CLINIC | Age: 67
End: 2017-10-11
Payer: COMMERCIAL

## 2017-10-11 DIAGNOSIS — F17.200 NICOTINE DEPENDENCE: Primary | ICD-10-CM

## 2017-10-11 PROCEDURE — 99407 BEHAV CHNG SMOKING > 10 MIN: CPT | Mod: S$GLB,,,

## 2017-10-11 NOTE — PROGRESS NOTES
Patient remains tobacco free since 9/7/2016. Contact information provided to schedule if needed. Will resolve this episode.

## 2017-10-16 ENCOUNTER — LAB VISIT (OUTPATIENT)
Dept: LAB | Facility: HOSPITAL | Age: 67
End: 2017-10-16
Attending: NURSE PRACTITIONER
Payer: MEDICARE

## 2017-10-16 DIAGNOSIS — I25.10 CORONARY ARTERY DISEASE INVOLVING NATIVE CORONARY ARTERY OF NATIVE HEART WITHOUT ANGINA PECTORIS: ICD-10-CM

## 2017-10-16 DIAGNOSIS — E78.5 HYPERLIPIDEMIA, UNSPECIFIED HYPERLIPIDEMIA TYPE: ICD-10-CM

## 2017-10-16 DIAGNOSIS — I10 ESSENTIAL HYPERTENSION: ICD-10-CM

## 2017-10-16 LAB
ALT SERPL W/O P-5'-P-CCNC: 18 U/L
ANION GAP SERPL CALC-SCNC: 15 MMOL/L
BUN SERPL-MCNC: 24 MG/DL
CALCIUM SERPL-MCNC: 9.8 MG/DL
CHLORIDE SERPL-SCNC: 104 MMOL/L
CHOLEST SERPL-MCNC: 128 MG/DL
CHOLEST/HDLC SERPL: 3.2 {RATIO}
CO2 SERPL-SCNC: 22 MMOL/L
CREAT SERPL-MCNC: 1.1 MG/DL
EST. GFR  (AFRICAN AMERICAN): >60 ML/MIN/1.73 M^2
EST. GFR  (NON AFRICAN AMERICAN): >60 ML/MIN/1.73 M^2
GLUCOSE SERPL-MCNC: 123 MG/DL
HDLC SERPL-MCNC: 40 MG/DL
HDLC SERPL: 31.3 %
LDLC SERPL CALC-MCNC: 72.8 MG/DL
NONHDLC SERPL-MCNC: 88 MG/DL
POTASSIUM SERPL-SCNC: 4.9 MMOL/L
SODIUM SERPL-SCNC: 141 MMOL/L
TRIGL SERPL-MCNC: 76 MG/DL

## 2017-10-16 PROCEDURE — 36415 COLL VENOUS BLD VENIPUNCTURE: CPT | Mod: PO

## 2017-10-16 PROCEDURE — 84460 ALANINE AMINO (ALT) (SGPT): CPT

## 2017-10-16 PROCEDURE — 80048 BASIC METABOLIC PNL TOTAL CA: CPT

## 2017-10-16 PROCEDURE — 80061 LIPID PANEL: CPT

## 2017-10-17 ENCOUNTER — PATIENT MESSAGE (OUTPATIENT)
Dept: CARDIOLOGY | Facility: CLINIC | Age: 67
End: 2017-10-17

## 2017-10-17 ENCOUNTER — TELEPHONE (OUTPATIENT)
Dept: CARDIOLOGY | Facility: CLINIC | Age: 67
End: 2017-10-17

## 2017-10-17 NOTE — TELEPHONE ENCOUNTER
The patient dropped off BP log.Will review with Adelaida Esparza NP in morning clinic.Patient called and notified.

## 2017-10-18 ENCOUNTER — PATIENT MESSAGE (OUTPATIENT)
Dept: CARDIOLOGY | Facility: CLINIC | Age: 67
End: 2017-10-18

## 2017-10-18 ENCOUNTER — TELEPHONE (OUTPATIENT)
Dept: CARDIOLOGY | Facility: CLINIC | Age: 67
End: 2017-10-18

## 2017-10-18 DIAGNOSIS — I10 ESSENTIAL HYPERTENSION: ICD-10-CM

## 2017-10-18 DIAGNOSIS — I10 ESSENTIAL HYPERTENSION: Primary | ICD-10-CM

## 2017-10-18 DIAGNOSIS — I25.5 ISCHEMIC CARDIOMYOPATHY: ICD-10-CM

## 2017-10-18 DIAGNOSIS — I25.10 CORONARY ARTERY DISEASE INVOLVING NATIVE CORONARY ARTERY OF NATIVE HEART WITHOUT ANGINA PECTORIS: ICD-10-CM

## 2017-10-18 RX ORDER — HYDROCHLOROTHIAZIDE 25 MG/1
25 TABLET ORAL DAILY
Qty: 90 TABLET | Refills: 3 | Status: SHIPPED | OUTPATIENT
Start: 2017-10-18 | End: 2017-10-30 | Stop reason: SDUPTHER

## 2017-10-18 NOTE — TELEPHONE ENCOUNTER
The patient was called and reviewed orders from Adelaida Esparza NP regarding his BP log.He was instructed to increase his HCTZ to 25 mg daily and get a BMP in 2 weeks.He will take( 2) 12.5 mg tablets for now until he needs a new refill Rx.He verbalized understanding.

## 2017-10-26 ENCOUNTER — PATIENT MESSAGE (OUTPATIENT)
Dept: FAMILY MEDICINE | Facility: CLINIC | Age: 67
End: 2017-10-26

## 2017-10-30 DIAGNOSIS — I10 ESSENTIAL HYPERTENSION: ICD-10-CM

## 2017-10-31 ENCOUNTER — PATIENT MESSAGE (OUTPATIENT)
Dept: FAMILY MEDICINE | Facility: CLINIC | Age: 67
End: 2017-10-31

## 2017-10-31 RX ORDER — OMEPRAZOLE 40 MG/1
40 CAPSULE, DELAYED RELEASE ORAL DAILY
Qty: 90 CAPSULE | Refills: 1 | Status: SHIPPED | OUTPATIENT
Start: 2017-10-31 | End: 2022-09-22

## 2017-11-01 RX ORDER — HYDROCHLOROTHIAZIDE 25 MG/1
25 TABLET ORAL DAILY
Qty: 90 TABLET | Refills: 3 | Status: SHIPPED | OUTPATIENT
Start: 2017-11-01 | End: 2018-04-02 | Stop reason: SDUPTHER

## 2017-11-08 ENCOUNTER — LAB VISIT (OUTPATIENT)
Dept: LAB | Facility: HOSPITAL | Age: 67
End: 2017-11-08
Attending: FAMILY MEDICINE
Payer: MEDICARE

## 2017-11-08 DIAGNOSIS — I10 ESSENTIAL HYPERTENSION: ICD-10-CM

## 2017-11-08 DIAGNOSIS — I25.5 ISCHEMIC CARDIOMYOPATHY: ICD-10-CM

## 2017-11-08 DIAGNOSIS — I25.10 CORONARY ARTERY DISEASE INVOLVING NATIVE CORONARY ARTERY OF NATIVE HEART WITHOUT ANGINA PECTORIS: ICD-10-CM

## 2017-11-08 LAB
ANION GAP SERPL CALC-SCNC: 10 MMOL/L
BUN SERPL-MCNC: 24 MG/DL
CALCIUM SERPL-MCNC: 9.8 MG/DL
CHLORIDE SERPL-SCNC: 96 MMOL/L
CO2 SERPL-SCNC: 29 MMOL/L
CREAT SERPL-MCNC: 0.9 MG/DL
EST. GFR  (AFRICAN AMERICAN): >60 ML/MIN/1.73 M^2
EST. GFR  (NON AFRICAN AMERICAN): >60 ML/MIN/1.73 M^2
GLUCOSE SERPL-MCNC: 115 MG/DL
POTASSIUM SERPL-SCNC: 4.9 MMOL/L
SODIUM SERPL-SCNC: 135 MMOL/L

## 2017-11-08 PROCEDURE — 36415 COLL VENOUS BLD VENIPUNCTURE: CPT | Mod: PO

## 2017-11-08 PROCEDURE — 80048 BASIC METABOLIC PNL TOTAL CA: CPT

## 2017-11-17 ENCOUNTER — OFFICE VISIT (OUTPATIENT)
Dept: FAMILY MEDICINE | Facility: CLINIC | Age: 67
End: 2017-11-17
Payer: MEDICARE

## 2017-11-17 VITALS
SYSTOLIC BLOOD PRESSURE: 113 MMHG | WEIGHT: 173 LBS | DIASTOLIC BLOOD PRESSURE: 66 MMHG | HEART RATE: 51 BPM | HEIGHT: 72 IN | BODY MASS INDEX: 23.43 KG/M2

## 2017-11-17 DIAGNOSIS — F32.1 MODERATE SINGLE CURRENT EPISODE OF MAJOR DEPRESSIVE DISORDER: ICD-10-CM

## 2017-11-17 DIAGNOSIS — D53.9 NUTRITIONAL ANEMIA: ICD-10-CM

## 2017-11-17 DIAGNOSIS — F10.21 ALCOHOLISM IN REMISSION: ICD-10-CM

## 2017-11-17 DIAGNOSIS — I10 ESSENTIAL HYPERTENSION: ICD-10-CM

## 2017-11-17 DIAGNOSIS — R53.83 FATIGUE, UNSPECIFIED TYPE: ICD-10-CM

## 2017-11-17 DIAGNOSIS — F41.9 ANXIETY: ICD-10-CM

## 2017-11-17 DIAGNOSIS — I25.10 CORONARY ARTERY DISEASE INVOLVING NATIVE CORONARY ARTERY OF NATIVE HEART WITHOUT ANGINA PECTORIS: ICD-10-CM

## 2017-11-17 DIAGNOSIS — J44.9 CHRONIC OBSTRUCTIVE PULMONARY DISEASE, UNSPECIFIED COPD TYPE: ICD-10-CM

## 2017-11-17 DIAGNOSIS — F43.10 PTSD (POST-TRAUMATIC STRESS DISORDER): ICD-10-CM

## 2017-11-17 DIAGNOSIS — D64.9 ANEMIA, UNSPECIFIED TYPE: Primary | ICD-10-CM

## 2017-11-17 PROCEDURE — 99214 OFFICE O/P EST MOD 30 MIN: CPT | Mod: S$PBB,,, | Performed by: FAMILY MEDICINE

## 2017-11-17 PROCEDURE — 99213 OFFICE O/P EST LOW 20 MIN: CPT | Mod: PBBFAC,PO | Performed by: FAMILY MEDICINE

## 2017-11-17 PROCEDURE — 99999 PR PBB SHADOW E&M-EST. PATIENT-LVL III: CPT | Mod: PBBFAC,,, | Performed by: FAMILY MEDICINE

## 2017-11-17 RX ORDER — ZOLPIDEM TARTRATE 10 MG/1
10 TABLET ORAL NIGHTLY PRN
COMMUNITY
End: 2022-09-20 | Stop reason: CLARIF

## 2017-11-18 NOTE — PROGRESS NOTES
Patient states [facility] and they told him he was anemic.  He doesn't have any more information than that.  He does have a previous history of iron deficiency which had resolved.  He has had some fatigue.  He is seeing psychiatrist there regarding PTSD.  He has COPD stable followed by pulmonary.    Past Medical History:  Past Medical History:   Diagnosis Date    Alcoholism in remission     Anxiety     CAD (coronary artery disease) 9/2/2016    Chronic back pain     pain mgt    Chronic hepatitis C without hepatic coma 1/8/2016    Colon polyps     COPD (chronic obstructive pulmonary disease)     Depressed left ventricular systolic function 9/6/2016    Depression     Diverticulosis     DJD (degenerative joint disease) of hip     Emphysema of lung     Esophageal ulcer with bleeding     GERD (gastroesophageal reflux disease)     Hepatitis C     completed Interferon therapy    Hiatal hernia     Hyperlipidemia with target LDL less than 100 1/7/2015    Hypertension     Hypertriglyceridemia     Osteomyelitis of right foot     Peripheral arterial disease     PTSD (post-traumatic stress disorder)     Spells     last Sept 2013, not sure if it was TIA v seizure    Squamous cell skin cancer     Testosterone deficiency     Tubular adenoma of colon      Past Surgical History:   Procedure Laterality Date    CARDIAC CATHETERIZATION      COLONOSCOPY  2008    COLONOSCOPY N/A 1/8/2016    Procedure: COLONOSCOPY;  Surgeon: Amauri Delatorre MD;  Location: [facility];  Service: Endoscopy;  Laterality: N/A;    CORONARY ARTERY BYPASS GRAFT  09/09/2016    FOOT SURGERY Right 12/6/2013    fifth metatarsal head resection [Other]    ORIF WRIST FRACTURE      LT    SHOULDER SURGERY      left    TONSILLECTOMY      UPPER GASTROINTESTINAL ENDOSCOPY  7/7/2016, 2010     Social History     Social History    Marital status:      Spouse name: N/A    Number of children: N/A    Years of  education: N/A     Occupational History    Not on file.     Social History Main Topics    Smoking status: Former Smoker     Packs/day: 1.50     Years: 40.00     Types: Cigarettes, Cigars     Quit date: 9/9/2016    Smokeless tobacco: Former User     Quit date: 9/9/2016    Alcohol use No      Comment: quit 30 y- prior alcoholism    Drug use:      Types: Oxycodone    Sexual activity: Not on file     Other Topics Concern    Not on file     Social History Narrative    No narrative on file     Family History   Problem Relation Age of Onset    Lung cancer Mother     Hypertension Mother     Hypertension Father     Arrhythmia Brother 60    Colon cancer Neg Hx     Stomach cancer Neg Hx      Review of patient's allergies indicates:   Allergen Reactions    No known drug allergies      Current Outpatient Prescriptions on File Prior to Visit   Medication Sig Dispense Refill    albuterol 90 mcg/actuation inhaler Inhale 2 puffs into the lungs every 6 (six) hours as needed for Wheezing or Shortness of Breath. 18 g 5    aspirin (ECOTRIN) 81 MG EC tablet Take 81 mg by mouth once daily.      atorvastatin (LIPITOR) 20 MG tablet Take 1 tablet (20 mg total) by mouth once daily. 90 tablet 2    benazepril (LOTENSIN) 40 MG tablet Take 40 mg by mouth 2 (two) times daily.      budesonide-formoterol 80-4.5 mcg (SYMBICORT) 80-4.5 mcg/actuation HFAA Inhale 1 puff into the lungs once daily. Controller      buPROPion (WELLBUTRIN XL) 300 MG 24 hr tablet Take 300 mg by mouth once daily.      carvedilol (COREG) 12.5 MG tablet Take 1 tablet (12.5 mg total) by mouth 2 (two) times daily with meals. 180 tablet 2    clonazePAM (KLONOPIN) 1 MG tablet Take 1 tablet (1 mg total) by mouth 2 (two) times daily. 60 tablet 1    coenzyme Q10 (CO Q-10) 100 mg capsule Take 100 mg by mouth once daily.      hydroCHLOROthiazide (HYDRODIURIL) 25 MG tablet Take 1 tablet (25 mg total) by mouth once daily. 90 tablet 3     krill-om3-dha-epa-om6-lip-astx 1,500-165-67.5 mg Cap Take 1 capsule by mouth.      LACTOBAC NO.41/BIFIDOBACT NO.7 (PROBIOTIC-10 ORAL) Take 10 mg by mouth once daily.      multivitamin with minerals tablet Take 1 tablet by mouth.      omeprazole (PRILOSEC) 40 MG capsule Take 1 capsule (40 mg total) by mouth once daily. 90 capsule 1    [DISCONTINUED] zolpidem (AMBIEN) 10 mg Tab Take 5 mg by mouth nightly as needed.       No current facility-administered medications on file prior to visit.            ROS:  GENERAL: No fever, chills,  or significant weight changes.   CARDIOVASCULAR: Denies chest pain, PND, orthopnea or reduced exercise tolerance.  ABDOMEN: Appetite fine. Denies diarrhea, abdominal pain, hematemesis or blood in stool.  URINARY: No flank pain, dysuria or hematuria.      OBJECTIVE:     Vitals:    11/17/17 1302   BP: 113/66   Pulse: (!) 51   Weight: 78.5 kg (173 lb)   Height: 6' (1.829 m)     Wt Readings from Last 3 Encounters:   11/17/17 78.5 kg (173 lb)   10/04/17 79.3 kg (174 lb 13.2 oz)   06/28/17 80.3 kg (177 lb 2.2 oz)     APPEARANCE: Well nourished, well developed, in no acute distress.    HEAD: Normocephalic.  Atraumatic.  No sinus tenderness.  EYES:   Right eye: Pupil reactive.  Conjunctiva clear.    Left eye: Pupil reactive.  Conjunctiva clear.    Both fundi:  Grossly normal to nondilated exam. EOMI.    EARS: TM's intact. Light reflex normal. No retraction or perforation.    NOSE:  clear.  MOUTH & THROAT:  No pharyngeal erythema or exudate. No lesions.  NECK: Supple. No bruits.  No JVD.  No cervical lymphadenopathy.  No thyromegaly.    CHEST: Breath sounds clear bilaterally.  Normal respiratory effort  CARDIOVASCULAR: Normal rate.  Regular rhythm.  No murmurs.  No rub.  No gallops.  ABDOMEN: Bowel sounds normal.  Soft.  No tenderness.  No organomegaly.  PERIPHERAL VASCULAR: No cyanosis.  No clubbing.  No edema.  NEUROLOGIC: No focal findings.  MENTAL STATUS: Alert.  Oriented x 3.        Ilana  was seen today for fatigue.    Diagnoses and all orders for this visit:    Anemia, unspecified type  -     CBC auto differential; Future  -     TSH; Future  -     Ferritin; Future  -     Iron and TIBC; Future  -     Folate; Future  -     Vitamin B12; Future  -     Comprehensive metabolic panel; Future    Fatigue, unspecified type  -     CBC auto differential; Future  -     TSH; Future  -     Ferritin; Future  -     Iron and TIBC; Future  -     Folate; Future  -     Vitamin B12; Future  -     Comprehensive metabolic panel; Future    PTSD (post-traumatic stress disorder)  -     Folate; Future  -     Vitamin B12; Future    Chronic obstructive pulmonary disease, unspecified COPD type  -     Folate; Future  -     Vitamin B12; Future    Essential hypertension  -     Folate; Future  -     Vitamin B12; Future    Coronary artery disease involving native coronary artery of native heart without angina pectoris  -     Folate; Future  -     Vitamin B12; Future    Alcoholism in remission  -     Folate; Future  -     Vitamin B12; Future    Moderate single current episode of major depressive disorder  -     Folate; Future  -     Vitamin B12; Future    Anxiety  -     Folate; Future  -     Vitamin B12; Future    Nutritional anemia   -     Folate; Future  -     Vitamin B12; Future      Further evaluation pending the above.  Reviewed previous endoscopies done upper and lower within the past 2 years.  Continue current medication

## 2017-11-20 ENCOUNTER — LAB VISIT (OUTPATIENT)
Dept: LAB | Facility: HOSPITAL | Age: 67
End: 2017-11-20
Attending: FAMILY MEDICINE
Payer: MEDICARE

## 2017-11-20 DIAGNOSIS — I25.10 CORONARY ARTERY DISEASE INVOLVING NATIVE CORONARY ARTERY OF NATIVE HEART WITHOUT ANGINA PECTORIS: ICD-10-CM

## 2017-11-20 DIAGNOSIS — D53.9 NUTRITIONAL ANEMIA: ICD-10-CM

## 2017-11-20 DIAGNOSIS — F41.9 ANXIETY: ICD-10-CM

## 2017-11-20 DIAGNOSIS — R53.83 FATIGUE, UNSPECIFIED TYPE: ICD-10-CM

## 2017-11-20 DIAGNOSIS — D64.9 ANEMIA, UNSPECIFIED TYPE: ICD-10-CM

## 2017-11-20 DIAGNOSIS — F32.1 MODERATE SINGLE CURRENT EPISODE OF MAJOR DEPRESSIVE DISORDER: ICD-10-CM

## 2017-11-20 DIAGNOSIS — F10.21 ALCOHOLISM IN REMISSION: ICD-10-CM

## 2017-11-20 DIAGNOSIS — F43.10 PTSD (POST-TRAUMATIC STRESS DISORDER): ICD-10-CM

## 2017-11-20 DIAGNOSIS — I10 ESSENTIAL HYPERTENSION: ICD-10-CM

## 2017-11-20 DIAGNOSIS — J44.9 CHRONIC OBSTRUCTIVE PULMONARY DISEASE, UNSPECIFIED COPD TYPE: ICD-10-CM

## 2017-11-20 LAB
ALBUMIN SERPL BCP-MCNC: 3.8 G/DL
ALP SERPL-CCNC: 50 U/L
ALT SERPL W/O P-5'-P-CCNC: 29 U/L
ANION GAP SERPL CALC-SCNC: 9 MMOL/L
AST SERPL-CCNC: 23 U/L
BASOPHILS # BLD AUTO: 0.03 K/UL
BASOPHILS NFR BLD: 0.4 %
BILIRUB SERPL-MCNC: 0.6 MG/DL
BUN SERPL-MCNC: 25 MG/DL
CALCIUM SERPL-MCNC: 10.2 MG/DL
CHLORIDE SERPL-SCNC: 100 MMOL/L
CO2 SERPL-SCNC: 30 MMOL/L
CREAT SERPL-MCNC: 1.1 MG/DL
DIFFERENTIAL METHOD: ABNORMAL
EOSINOPHIL # BLD AUTO: 0.2 K/UL
EOSINOPHIL NFR BLD: 3.5 %
ERYTHROCYTE [DISTWIDTH] IN BLOOD BY AUTOMATED COUNT: 14.5 %
EST. GFR  (AFRICAN AMERICAN): >60 ML/MIN/1.73 M^2
EST. GFR  (NON AFRICAN AMERICAN): >60 ML/MIN/1.73 M^2
FERRITIN SERPL-MCNC: 40 NG/ML
FOLATE SERPL-MCNC: 16.2 NG/ML
GLUCOSE SERPL-MCNC: 113 MG/DL
HCT VFR BLD AUTO: 42.1 %
HGB BLD-MCNC: 13.4 G/DL
IMM GRANULOCYTES # BLD AUTO: 0.02 K/UL
IMM GRANULOCYTES NFR BLD AUTO: 0.3 %
IRON SERPL-MCNC: 111 UG/DL
LYMPHOCYTES # BLD AUTO: 1 K/UL
LYMPHOCYTES NFR BLD: 14.3 %
MCH RBC QN AUTO: 30.2 PG
MCHC RBC AUTO-ENTMCNC: 31.8 G/DL
MCV RBC AUTO: 95 FL
MONOCYTES # BLD AUTO: 0.6 K/UL
MONOCYTES NFR BLD: 9.2 %
NEUTROPHILS # BLD AUTO: 5 K/UL
NEUTROPHILS NFR BLD: 72.3 %
NRBC BLD-RTO: 0 /100 WBC
PLATELET # BLD AUTO: 234 K/UL
PMV BLD AUTO: 10.7 FL
POTASSIUM SERPL-SCNC: 5.5 MMOL/L
PROT SERPL-MCNC: 7.8 G/DL
RBC # BLD AUTO: 4.43 M/UL
SATURATED IRON: 31 %
SODIUM SERPL-SCNC: 139 MMOL/L
T4 FREE SERPL-MCNC: 1.16 NG/DL
TOTAL IRON BINDING CAPACITY: 360 UG/DL
TRANSFERRIN SERPL-MCNC: 243 MG/DL
TSH SERPL DL<=0.005 MIU/L-ACNC: 6.82 UIU/ML
VIT B12 SERPL-MCNC: 684 PG/ML
WBC # BLD AUTO: 6.92 K/UL

## 2017-11-20 PROCEDURE — 82728 ASSAY OF FERRITIN: CPT

## 2017-11-20 PROCEDURE — 82607 VITAMIN B-12: CPT

## 2017-11-20 PROCEDURE — 84439 ASSAY OF FREE THYROXINE: CPT

## 2017-11-20 PROCEDURE — 85025 COMPLETE CBC W/AUTO DIFF WBC: CPT

## 2017-11-20 PROCEDURE — 80053 COMPREHEN METABOLIC PANEL: CPT

## 2017-11-20 PROCEDURE — 82746 ASSAY OF FOLIC ACID SERUM: CPT

## 2017-11-20 PROCEDURE — 84443 ASSAY THYROID STIM HORMONE: CPT

## 2017-11-20 PROCEDURE — 36415 COLL VENOUS BLD VENIPUNCTURE: CPT | Mod: PO

## 2017-11-20 PROCEDURE — 83540 ASSAY OF IRON: CPT

## 2017-11-29 ENCOUNTER — TELEPHONE (OUTPATIENT)
Dept: FAMILY MEDICINE | Facility: CLINIC | Age: 67
End: 2017-11-29

## 2017-11-29 DIAGNOSIS — E87.5 SERUM POTASSIUM ELEVATED: ICD-10-CM

## 2017-11-29 DIAGNOSIS — R94.6 NONSPECIFIC ABNORMAL RESULTS OF FUNCTION STUDY OF THYROID: Primary | ICD-10-CM

## 2017-11-29 NOTE — TELEPHONE ENCOUNTER
MD HARJINDER Santiago. Staff             Thyroid test borderline.  Potassium mildly elevated.  Recommend repeat TSH, potassium level in 6 weeks.. My nurse will contact you to arrange.   Thanks,   Dr. Acosta     Results released on [facility] [employer]

## 2017-12-12 ENCOUNTER — TELEPHONE (OUTPATIENT)
Dept: FAMILY MEDICINE | Facility: CLINIC | Age: 67
End: 2017-12-12

## 2017-12-12 NOTE — TELEPHONE ENCOUNTER
[facility] reports [facility] and he wanted to make sure it was not done here, but, he does not know what type of ultrasound they ordered.  He will check with them and call back if necessary.

## 2017-12-12 NOTE — TELEPHONE ENCOUNTER
----- Message from Marsiabel Valdez sent at 12/12/2017 11:43 AM CST -----  Contact: self  Patient would like to know when the last time she had an ultrasound. Please call back at [phone].      Thanks,  Marisabel Valdez

## 2017-12-13 ENCOUNTER — PATIENT MESSAGE (OUTPATIENT)
Dept: FAMILY MEDICINE | Facility: CLINIC | Age: 67
End: 2017-12-13

## 2018-02-16 ENCOUNTER — TELEPHONE (OUTPATIENT)
Dept: CARDIOLOGY | Facility: CLINIC | Age: 68
End: 2018-02-16

## 2018-02-16 NOTE — TELEPHONE ENCOUNTER
Called and left v/m I need him to call me , Julissa, at [phone], to reschedule his appt with Dr House because he will not be day of his current appt. cm

## 2018-02-19 ENCOUNTER — TELEPHONE (OUTPATIENT)
Dept: CARDIOLOGY | Facility: CLINIC | Age: 68
End: 2018-02-19

## 2018-02-19 NOTE — TELEPHONE ENCOUNTER
----- Message from Vandana Arellano sent at 2/19/2018  8:54 AM CST -----  Contact: pt  Calling in  Regards to a missed call and please call at [phone] ([email or web address])

## 2018-03-19 ENCOUNTER — OFFICE VISIT (OUTPATIENT)
Dept: CARDIOLOGY | Facility: CLINIC | Age: 68
End: 2018-03-19
Payer: MEDICARE

## 2018-03-19 VITALS
HEIGHT: 73 IN | BODY MASS INDEX: 23.11 KG/M2 | DIASTOLIC BLOOD PRESSURE: 72 MMHG | SYSTOLIC BLOOD PRESSURE: 125 MMHG | WEIGHT: 174.38 LBS | HEART RATE: 62 BPM

## 2018-03-19 DIAGNOSIS — Z95.1 HX OF CABG: ICD-10-CM

## 2018-03-19 DIAGNOSIS — E78.5 HYPERLIPIDEMIA WITH TARGET LDL LESS THAN 100: ICD-10-CM

## 2018-03-19 DIAGNOSIS — E78.1 HYPERTRIGLYCERIDEMIA: ICD-10-CM

## 2018-03-19 DIAGNOSIS — I73.9 PERIPHERAL ARTERIAL DISEASE: ICD-10-CM

## 2018-03-19 DIAGNOSIS — I25.5 ISCHEMIC CARDIOMYOPATHY: ICD-10-CM

## 2018-03-19 DIAGNOSIS — I10 ESSENTIAL HYPERTENSION: ICD-10-CM

## 2018-03-19 DIAGNOSIS — I25.10 CORONARY ARTERY DISEASE INVOLVING NATIVE CORONARY ARTERY OF NATIVE HEART WITHOUT ANGINA PECTORIS: Primary | ICD-10-CM

## 2018-03-19 DIAGNOSIS — J42 CHRONIC BRONCHITIS, UNSPECIFIED CHRONIC BRONCHITIS TYPE: ICD-10-CM

## 2018-03-19 PROCEDURE — 99999 PR PBB SHADOW E&M-EST. PATIENT-LVL III: CPT | Mod: PBBFAC,,, | Performed by: INTERNAL MEDICINE

## 2018-03-19 PROCEDURE — 99214 OFFICE O/P EST MOD 30 MIN: CPT | Mod: S$PBB,,, | Performed by: INTERNAL MEDICINE

## 2018-03-19 PROCEDURE — 99213 OFFICE O/P EST LOW 20 MIN: CPT | Mod: PBBFAC,PO | Performed by: INTERNAL MEDICINE

## 2018-03-19 NOTE — PROGRESS NOTES
Subjective:   Patient ID:  Ilana Carr is a 67 y.o. male who presents for follow-up of CAD/CABG, HTN, HLP.  Patient denies CP, angina or anginal equivalent. Pt walks 1 mile /day without sx.  Hypertension   This is a chronic problem. The current episode started more than 1 year ago. The problem has been gradually improving since onset. The problem is controlled. Pertinent negatives include no chest pain, palpitations or shortness of breath. Past treatments include beta blockers, ACE inhibitors and diuretics. The current treatment provides moderate improvement. There are no compliance problems.    Hyperlipidemia   This is a chronic problem. The current episode started more than 1 year ago. The problem is controlled. Recent lipid tests were reviewed and are variable. Pertinent negatives include no chest pain or shortness of breath. Current antihyperlipidemic treatment includes statins. The current treatment provides moderate improvement of lipids. There are no compliance problems.    Coronary Artery Disease   Presents for follow-up visit. Pertinent negatives include no chest pain, chest pressure, chest tightness, dizziness, leg swelling, muscle weakness, palpitations, shortness of breath or weight gain. Risk factors include hyperlipidemia. The symptoms have been stable. Compliance with diet is variable. Compliance with exercise is variable. Compliance with medications is good.       Review of Systems   Constitution: Negative. Negative for weight gain.   HENT: Negative.    Eyes: Negative.    Cardiovascular: Negative.  Negative for chest pain, leg swelling and palpitations.   Respiratory: Negative.  Negative for chest tightness and shortness of breath.    Endocrine: Negative.    Hematologic/Lymphatic: Negative.    Skin: Negative.    Musculoskeletal: Negative for muscle weakness.   Gastrointestinal: Negative.    Genitourinary: Negative.    Neurological: Negative.  Negative for dizziness.   Psychiatric/Behavioral:  Negative.    Allergic/Immunologic: Negative.      Family History   Problem Relation Age of Onset    Lung cancer Mother     Hypertension Mother     Hypertension Father     Arrhythmia Brother 60    Colon cancer Neg Hx     Stomach cancer Neg Hx      Past Medical History:   Diagnosis Date    Alcoholism in remission     Anxiety     CAD (coronary artery disease) 9/2/2016    Chronic back pain     pain mgt    Chronic hepatitis C without hepatic coma 1/8/2016    Colon polyps     COPD (chronic obstructive pulmonary disease)     Depressed left ventricular systolic function 9/6/2016    Depression     Diverticulosis     DJD (degenerative joint disease) of hip     Emphysema of lung     Esophageal ulcer with bleeding     GERD (gastroesophageal reflux disease)     Hepatitis C     completed Interferon therapy    Hiatal hernia     Hyperlipidemia with target LDL less than 100 1/7/2015    Hypertension     Hypertriglyceridemia     Osteomyelitis of right foot     Peripheral arterial disease     PTSD (post-traumatic stress disorder)     Spells     last Sept 2013, not sure if it was TIA v seizure    Squamous cell skin cancer     Testosterone deficiency     Tubular adenoma of colon      Current Outpatient Prescriptions on File Prior to Visit   Medication Sig Dispense Refill    albuterol 90 mcg/actuation inhaler Inhale 2 puffs into the lungs every 6 (six) hours as needed for Wheezing or Shortness of Breath. 18 g 5    aspirin (ECOTRIN) 81 MG EC tablet Take 81 mg by mouth once daily.      atorvastatin (LIPITOR) 20 MG tablet Take 1 tablet (20 mg total) by mouth once daily. 90 tablet 2    benazepril (LOTENSIN) 40 MG tablet Take 40 mg by mouth 2 (two) times daily.      budesonide-formoterol 80-4.5 mcg (SYMBICORT) 80-4.5 mcg/actuation HFAA Inhale 1 puff into the lungs once daily. Controller      buPROPion (WELLBUTRIN XL) 300 MG 24 hr tablet Take 300 mg by mouth once daily.      carvedilol (COREG) 12.5 MG  tablet Take 1 tablet (12.5 mg total) by mouth 2 (two) times daily with meals. 180 tablet 2    clonazePAM (KLONOPIN) 1 MG tablet Take 1 tablet (1 mg total) by mouth 2 (two) times daily. 60 tablet 1    coenzyme Q10 (CO Q-10) 100 mg capsule Take 100 mg by mouth once daily.      hydroCHLOROthiazide (HYDRODIURIL) 25 MG tablet Take 1 tablet (25 mg total) by mouth once daily. 90 tablet 3    krill-om3-dha-epa-om6-lip-astx 1,500-165-67.5 mg Cap Take 1 capsule by mouth.      LACTOBAC NO.41/BIFIDOBACT NO.7 (PROBIOTIC-10 ORAL) Take 10 mg by mouth once daily.      multivitamin with minerals tablet Take 1 tablet by mouth.      omeprazole (PRILOSEC) 40 MG capsule Take 1 capsule (40 mg total) by mouth once daily. 90 capsule 1    zolpidem (AMBIEN) 10 mg Tab Take 10 mg by mouth nightly as needed.       No current facility-administered medications on file prior to visit.      Review of patient's allergies indicates:   Allergen Reactions    No known drug allergies        Objective:     Physical Exam   Constitutional: He is oriented to person, place, and time. He appears well-developed and well-nourished.   HENT:   Head: Normocephalic and atraumatic.   Eyes: Conjunctivae are normal. Pupils are equal, round, and reactive to light.   Neck: Normal range of motion. Neck supple.   Cardiovascular: Normal rate, regular rhythm, normal heart sounds and intact distal pulses.    Pulmonary/Chest: Effort normal and breath sounds normal.   Abdominal: Soft. Bowel sounds are normal.   Neurological: He is alert and oriented to person, place, and time.   Skin: Skin is warm and dry.   Psychiatric: He has a normal mood and affect.   Nursing note and vitals reviewed.      Assessment:     1. Coronary artery disease involving native coronary artery of native heart without angina pectoris    2. Hypertriglyceridemia    3. Essential hypertension    4. Peripheral arterial disease    5. Hyperlipidemia with target LDL less than 100    6. Chronic  bronchitis, unspecified chronic bronchitis type    7. Hx of CABG    8. Ischemic cardiomyopathy        Plan:     Coronary artery disease involving native coronary artery of native heart without angina pectoris    Hypertriglyceridemia    Essential hypertension    Peripheral arterial disease    Hyperlipidemia with target LDL less than 100    Chronic bronchitis, unspecified chronic bronchitis type    Hx of CABG    Ischemic cardiomyopathy      Continue asa- cad  Continue coreg, hctz, benazapril-htn  Continue statin-hlp

## 2018-04-02 ENCOUNTER — OFFICE VISIT (OUTPATIENT)
Dept: FAMILY MEDICINE | Facility: CLINIC | Age: 68
End: 2018-04-02
Payer: MEDICARE

## 2018-04-02 VITALS
BODY MASS INDEX: 23.43 KG/M2 | TEMPERATURE: 98 F | SYSTOLIC BLOOD PRESSURE: 86 MMHG | DIASTOLIC BLOOD PRESSURE: 54 MMHG | WEIGHT: 173 LBS | HEART RATE: 61 BPM | HEIGHT: 72 IN

## 2018-04-02 DIAGNOSIS — K52.9 GASTROENTERITIS: Primary | ICD-10-CM

## 2018-04-02 DIAGNOSIS — I10 ESSENTIAL HYPERTENSION: ICD-10-CM

## 2018-04-02 DIAGNOSIS — Z86.19 HISTORY OF HEPATITIS C: ICD-10-CM

## 2018-04-02 DIAGNOSIS — F10.21 ALCOHOLISM IN REMISSION: ICD-10-CM

## 2018-04-02 DIAGNOSIS — F32.1 CURRENT MODERATE EPISODE OF MAJOR DEPRESSIVE DISORDER WITHOUT PRIOR EPISODE: ICD-10-CM

## 2018-04-02 DIAGNOSIS — I95.2 HYPOTENSION DUE TO DRUGS: ICD-10-CM

## 2018-04-02 PROCEDURE — 99214 OFFICE O/P EST MOD 30 MIN: CPT | Mod: PBBFAC,PO | Performed by: FAMILY MEDICINE

## 2018-04-02 PROCEDURE — 99999 PR PBB SHADOW E&M-EST. PATIENT-LVL IV: CPT | Mod: PBBFAC,,, | Performed by: FAMILY MEDICINE

## 2018-04-02 PROCEDURE — 99214 OFFICE O/P EST MOD 30 MIN: CPT | Mod: S$PBB,,, | Performed by: FAMILY MEDICINE

## 2018-04-02 RX ORDER — HYDROCHLOROTHIAZIDE 25 MG/1
12.5 TABLET ORAL DAILY
Refills: 3 | COMMUNITY
Start: 2018-04-02 | End: 2018-04-03 | Stop reason: CLARIF

## 2018-04-03 ENCOUNTER — PATIENT MESSAGE (OUTPATIENT)
Dept: FAMILY MEDICINE | Facility: CLINIC | Age: 68
End: 2018-04-03

## 2018-04-03 RX ORDER — BENAZEPRIL HYDROCHLORIDE 20 MG/1
20 TABLET ORAL NIGHTLY
COMMUNITY
End: 2019-04-12 | Stop reason: DRUGHIGH

## 2018-04-03 RX ORDER — HYDROCHLOROTHIAZIDE 12.5 MG/1
12.5 TABLET ORAL DAILY
COMMUNITY
End: 2018-04-16 | Stop reason: SDUPTHER

## 2018-04-03 NOTE — PROGRESS NOTES
4 days ago patient had one day of nausea and vomiting several episodes.  He had some associated burning umbilical discomfort.  Symptoms now resolved.  He had eaten crawfish prior to symptoms.  No fever or chills.  No hematemesis.  No diarrhea or constipation.  States did not drink any alcohol in years.  No significant weight changes.  Previous alcoholism.  Depression currently controlled-[facility].  Hypertension with low blood pressure today.  [facility] with [facility].  Occasionally feels a little lightheaded, nothing new.  No syncope.    Past Medical History:  Past Medical History:   Diagnosis Date    Alcoholism in remission     Anxiety     CAD (coronary artery disease) 9/2/2016    Chronic back pain     pain mgt    Chronic hepatitis C without hepatic coma 1/8/2016    Colon polyps     COPD (chronic obstructive pulmonary disease)     Depressed left ventricular systolic function 9/6/2016    Depression     Diverticulosis     DJD (degenerative joint disease) of hip     Emphysema of lung     Esophageal ulcer with bleeding     GERD (gastroesophageal reflux disease)     Hepatitis C     completed Interferon therapy    Hiatal hernia     Hyperlipidemia with target LDL less than 100 1/7/2015    Hypertension     Hypertriglyceridemia     Osteomyelitis of right foot     Peripheral arterial disease     PTSD (post-traumatic stress disorder)     Spells     last Sept 2013, not sure if it was TIA v seizure    Squamous cell skin cancer     Testosterone deficiency     Tubular adenoma of colon      Past Surgical History:   Procedure Laterality Date    CARDIAC CATHETERIZATION      COLONOSCOPY  2008    COLONOSCOPY N/A 1/8/2016    Procedure: COLONOSCOPY;  Surgeon: Amauri Delatorre MD;  Location: [facility];  Service: Endoscopy;  Laterality: N/A;    CORONARY ARTERY BYPASS GRAFT  09/09/2016    FOOT SURGERY Right 12/6/2013    fifth metatarsal head resection [Other]    ORIF WRIST FRACTURE      LT     SHOULDER SURGERY      left    TONSILLECTOMY      UPPER GASTROINTESTINAL ENDOSCOPY  7/7/2016, 2010     Social History     Social History    Marital status:      Spouse name: N/A    Number of children: N/A    Years of education: N/A     Occupational History    Not on file.     Social History Main Topics    Smoking status: Former Smoker     Packs/day: 1.50     Years: 40.00     Types: Cigarettes, Cigars     Quit date: 9/9/2016    Smokeless tobacco: Former User     Quit date: 9/9/2016    Alcohol use No      Comment: quit 30 y- prior alcoholism    Drug use: Yes     Types: Oxycodone    Sexual activity: Not on file     Other Topics Concern    Not on file     Social History Narrative    No narrative on file     Family History   Problem Relation Age of Onset    Lung cancer Mother     Hypertension Mother     Hypertension Father     Arrhythmia Brother 60    Colon cancer Neg Hx     Stomach cancer Neg Hx      Review of patient's allergies indicates:   Allergen Reactions    No known drug allergies      Current Outpatient Prescriptions on File Prior to Visit   Medication Sig Dispense Refill    albuterol 90 mcg/actuation inhaler Inhale 2 puffs into the lungs every 6 (six) hours as needed for Wheezing or Shortness of Breath. 18 g 5    aspirin (ECOTRIN) 81 MG EC tablet Take 81 mg by mouth once daily.      atorvastatin (LIPITOR) 20 MG tablet Take 1 tablet (20 mg total) by mouth once daily. 90 tablet 2    benazepril (LOTENSIN) 40 MG tablet Take 40 mg by mouth once daily.      budesonide-formoterol 80-4.5 mcg (SYMBICORT) 80-4.5 mcg/actuation HFAA Inhale 1 puff into the lungs once daily. Controller      buPROPion (WELLBUTRIN XL) 300 MG 24 hr tablet Take 300 mg by mouth once daily.      carvedilol (COREG) 12.5 MG tablet Take 1 tablet (12.5 mg total) by mouth 2 (two) times daily with meals. 180 tablet 2    clonazePAM (KLONOPIN) 1 MG tablet Take 1 tablet (1 mg total) by mouth 2 (two) times daily. 60  tablet 1    coenzyme Q10 (CO Q-10) 100 mg capsule Take 100 mg by mouth once daily.      krill-om3-dha-epa-om6-lip-astx 1,500-165-67.5 mg Cap Take 1 capsule by mouth.      LACTOBAC NO.41/BIFIDOBACT NO.7 (PROBIOTIC-10 ORAL) Take 10 mg by mouth once daily.      METHADONE HCL (METHADONE ORAL) Take 40 mg by mouth once daily.      multivitamin with minerals tablet Take 1 tablet by mouth.      omeprazole (PRILOSEC) 40 MG capsule Take 1 capsule (40 mg total) by mouth once daily. 90 capsule 1    zolpidem (AMBIEN) 10 mg Tab Take 10 mg by mouth nightly as needed.      [DISCONTINUED] hydroCHLOROthiazide (HYDRODIURIL) 25 MG tablet Take 1 tablet (25 mg total) by mouth once daily. 90 tablet 3     No current facility-administered medications on file prior to visit.        ROS:  GENERAL: No fever, chills,  or significant weight changes.   CARDIOVASCULAR: Denies chest pain, PND, orthopnea or reduced exercise tolerance.  ABDOMEN: Appetite fine. Denies diarrhea, abdominal pain, hematemesis or blood in stool.  URINARY: No flank pain, dysuria or hematuria.      OBJECTIVE:     Vitals:    04/02/18 1344   BP: (!) 86/54   Pulse: 61   Temp: 98 °F (36.7 °C)   Weight: 78.5 kg (173 lb)   Height: 6' (1.829 m)     Wt Readings from Last 3 Encounters:   04/02/18 78.5 kg (173 lb)   03/19/18 79.1 kg (174 lb 6.1 oz)   11/17/17 78.5 kg (173 lb)     APPEARANCE: Well nourished, well developed, in no acute distress.    HEAD: Normocephalic.  Atraumatic.  No sinus tenderness.  EYES:   Right eye: Pupil reactive.  Conjunctiva clear.    Left eye: Pupil reactive.  Conjunctiva clear.    Both fundi:  Grossly normal to nondilated exam. EOMI.    EARS: TM's intact. Light reflex normal. No retraction or perforation.    NOSE:  clear.  MOUTH & THROAT:  No pharyngeal erythema or exudate. No lesions.  NECK: Supple. No bruits.  No JVD.  No cervical lymphadenopathy.  No thyromegaly.    CHEST: Breath sounds clear bilaterally.  Normal respiratory  effort  CARDIOVASCULAR: Normal rate.  Regular rhythm.  No murmurs.  No rub.  No gallops.  ABDOMEN: Bowel sounds normal.  Soft.  No tenderness.  No organomegaly.  PERIPHERAL VASCULAR: No cyanosis.  No clubbing.  No edema.  NEUROLOGIC: No focal findings.  MENTAL STATUS: Alert.  Oriented x 3.            Ilana was seen today for gi problem.    Diagnoses and all orders for this visit:    Gastroenteritis    History of hepatitis C    Hypotension due to drugs    Current moderate episode of major depressive disorder without prior episode    Essential hypertension    Alcoholism in remission     symptoms resolved.  Follow up if any recurrence.  We'll have him decrease his benazepril once a day instead of twice a day.  Decrease hydrochlorothiazide half a pill a day.  Continue other medication as currently.  Increase fluid intake.  Follow-up in 2 weeks.

## 2018-04-12 ENCOUNTER — PATIENT OUTREACH (OUTPATIENT)
Dept: ADMINISTRATIVE | Facility: HOSPITAL | Age: 68
End: 2018-04-12

## 2018-04-16 ENCOUNTER — OFFICE VISIT (OUTPATIENT)
Dept: FAMILY MEDICINE | Facility: CLINIC | Age: 68
End: 2018-04-16
Payer: MEDICARE

## 2018-04-16 VITALS
DIASTOLIC BLOOD PRESSURE: 80 MMHG | BODY MASS INDEX: 23.43 KG/M2 | WEIGHT: 173 LBS | HEIGHT: 72 IN | SYSTOLIC BLOOD PRESSURE: 140 MMHG | HEART RATE: 52 BPM

## 2018-04-16 DIAGNOSIS — J42 CHRONIC BRONCHITIS, UNSPECIFIED CHRONIC BRONCHITIS TYPE: ICD-10-CM

## 2018-04-16 DIAGNOSIS — F11.20 OPIOID DEPENDENCE IN CONTROLLED ENVIRONMENT: ICD-10-CM

## 2018-04-16 DIAGNOSIS — F32.9 MAJOR DEPRESSIVE DISORDER WITH SINGLE EPISODE, REMISSION STATUS UNSPECIFIED: ICD-10-CM

## 2018-04-16 DIAGNOSIS — I10 ESSENTIAL HYPERTENSION: Primary | ICD-10-CM

## 2018-04-16 PROCEDURE — 99213 OFFICE O/P EST LOW 20 MIN: CPT | Mod: PBBFAC,PO | Performed by: FAMILY MEDICINE

## 2018-04-16 PROCEDURE — 99213 OFFICE O/P EST LOW 20 MIN: CPT | Mod: S$PBB,,, | Performed by: FAMILY MEDICINE

## 2018-04-16 PROCEDURE — 99999 PR PBB SHADOW E&M-EST. PATIENT-LVL III: CPT | Mod: PBBFAC,,, | Performed by: FAMILY MEDICINE

## 2018-04-16 RX ORDER — HYDROCHLOROTHIAZIDE 25 MG/1
12.5 TABLET ORAL DAILY
COMMUNITY
Start: 2018-04-16 | End: 2019-04-12 | Stop reason: DRUGHIGH

## 2018-04-16 NOTE — PROGRESS NOTES
Patient presents follow-up hypertension.  We had cut back his medication because his blood pressure was low.  This was in relation to a gastroenteritis which has now resolved.  He had some confusion as to how often he was actually taking his medication.  States currently using benazepril twice a day, Coreg twice a day, but not taking HCTZ.  He does note that when he gets up in the morning he has to cough up some mucus and then feels better afterwards.  Only partial compliance with his inhalers for COPD.  [facility].  Psychiatric issues including depression PTSD [facility]    Past Medical History:  Past Medical History:   Diagnosis Date    Alcoholism in remission     Anxiety     CAD (coronary artery disease) 9/2/2016    Chronic back pain     pain mgt    Chronic hepatitis C without hepatic coma 1/8/2016    Colon polyps     COPD (chronic obstructive pulmonary disease)     Depressed left ventricular systolic function 9/6/2016    Depression     Diverticulosis     DJD (degenerative joint disease) of hip     Emphysema of lung     Esophageal ulcer with bleeding     GERD (gastroesophageal reflux disease)     Hepatitis C     completed Interferon therapy    Hiatal hernia     Hyperlipidemia with target LDL less than 100 1/7/2015    Hypertension     Hypertriglyceridemia     Opioid dependence in controlled environment 4/16/2018    Osteomyelitis of right foot     Peripheral arterial disease     PTSD (post-traumatic stress disorder)     Spells     last Sept 2013, not sure if it was TIA v seizure    Squamous cell skin cancer     Testosterone deficiency     Tubular adenoma of colon      Past Surgical History:   Procedure Laterality Date    CARDIAC CATHETERIZATION      COLONOSCOPY  2008    COLONOSCOPY N/A 1/8/2016    Procedure: COLONOSCOPY;  Surgeon: Amauri Delatorre MD;  Location: [facility];  Service: Endoscopy;  Laterality: N/A;    CORONARY ARTERY BYPASS GRAFT   09/09/2016    FOOT SURGERY Right 12/6/2013    fifth metatarsal head resection [Other]    ORIF WRIST FRACTURE      LT    SHOULDER SURGERY      left    TONSILLECTOMY      UPPER GASTROINTESTINAL ENDOSCOPY  7/7/2016, 2010     Social History     Social History    Marital status:      Spouse name: N/A    Number of children: N/A    Years of education: N/A     Occupational History    Not on file.     Social History Main Topics    Smoking status: Former Smoker     Packs/day: 1.50     Years: 40.00     Types: Cigarettes, Cigars     Quit date: 9/9/2016    Smokeless tobacco: Former User     Quit date: 9/9/2016    Alcohol use No      Comment: quit 30 y- prior alcoholism    Drug use: Yes     Types: Oxycodone    Sexual activity: Not on file     Other Topics Concern    Not on file     Social History Narrative    No narrative on file     Family History   Problem Relation Age of Onset    Lung cancer Mother     Hypertension Mother     Hypertension Father     Arrhythmia Brother 60    Colon cancer Neg Hx     Stomach cancer Neg Hx      Review of patient's allergies indicates:   Allergen Reactions    No known drug allergies      Current Outpatient Prescriptions on File Prior to Visit   Medication Sig Dispense Refill    albuterol 90 mcg/actuation inhaler Inhale 2 puffs into the lungs every 6 (six) hours as needed for Wheezing or Shortness of Breath. 18 g 5    aspirin (ECOTRIN) 81 MG EC tablet Take 81 mg by mouth once daily.      atorvastatin (LIPITOR) 20 MG tablet Take 1 tablet (20 mg total) by mouth once daily. 90 tablet 2    benazepril (LOTENSIN) 20 MG tablet Take 20 mg by mouth 2 (two) times daily.       budesonide-formoterol 80-4.5 mcg (SYMBICORT) 80-4.5 mcg/actuation HFAA Inhale 2 puffs into the lungs 2 (two) times daily. Controller       buPROPion (WELLBUTRIN XL) 300 MG 24 hr tablet Take 300 mg by mouth once daily.      carvedilol (COREG) 12.5 MG tablet Take 1 tablet (12.5 mg total) by mouth 2  (two) times daily with meals. 180 tablet 2    clonazePAM (KLONOPIN) 1 MG tablet Take 1 tablet (1 mg total) by mouth 2 (two) times daily. 60 tablet 1    coenzyme Q10 (CO Q-10) 100 mg capsule Take 100 mg by mouth once daily.      krill-om3-dha-epa-om6-lip-astx 1,500-165-67.5 mg Cap Take 1 capsule by mouth.      LACTOBAC NO.41/BIFIDOBACT NO.7 (PROBIOTIC-10 ORAL) Take 10 mg by mouth once daily.      METHADONE HCL (METHADONE ORAL) Take 40 mg by mouth once daily.      multivitamin with minerals tablet Take 1 tablet by mouth.      omeprazole (PRILOSEC) 40 MG capsule Take 1 capsule (40 mg total) by mouth once daily. 90 capsule 1    zolpidem (AMBIEN) 10 mg Tab Take 10 mg by mouth nightly as needed.      [DISCONTINUED] hydroCHLOROthiazide (HYDRODIURIL) 12.5 MG Tab Take 12.5 mg by mouth once daily.       No current facility-administered medications on file prior to visit.            ROS:  GENERAL: No fever, chills,  or significant weight changes.   CARDIOVASCULAR: Denies chest pain, PND    Vitals:    04/16/18 1022   BP: (!) 140/80   Pulse: (!) 52   Weight: 78.5 kg (173 lb)   Height: 6' (1.829 m)       Wt Readings from Last 3 Encounters:   04/16/18 78.5 kg (173 lb)   04/02/18 78.5 kg (173 lb)   03/19/18 79.1 kg (174 lb 6.1 oz)       APPEARANCE: Well nourished, well developed, in no acute distress.    HEAD: Normocephalic.  Atraumatic.  EYES:   Right eye: Pupil reactive.  Conjunctiva clear.    Left eye: Pupil reactive.  Conjunctiva clear.    NECK: Supple. No bruits.  No JVD.  No cervical lymphadenopathy.  No thyromegaly.    CHEST: Breath sounds clear bilaterally.  Normal respiratory effort  CARDIOVASCULAR: Normal rate.  Regular rhythm.  No murmurs.  No rub.  No gallops.   No edema.  MENTAL STATUS: Alert.  Oriented x 3.        Ilana was seen today for follow-up.    Diagnoses and all orders for this visit:    Essential hypertension    Chronic bronchitis, unspecified chronic bronchitis type    Major depressive disorder with  single episode, remission status unspecified    Opioid dependence in controlled environment      Resume hydrochlorothiazide 12.5 mg daily.  Continue other medication as currently.  Recheck blood pressure 3-4 weeks with nurse.  Make sure that he is using his Symbicort twice a day as prescribed.  [facility]

## 2018-05-08 ENCOUNTER — TELEPHONE (OUTPATIENT)
Dept: FAMILY MEDICINE | Facility: CLINIC | Age: 68
End: 2018-05-08

## 2018-05-08 ENCOUNTER — CLINICAL SUPPORT (OUTPATIENT)
Dept: FAMILY MEDICINE | Facility: CLINIC | Age: 68
End: 2018-05-08
Payer: MEDICARE

## 2018-05-08 VITALS — SYSTOLIC BLOOD PRESSURE: 118 MMHG | DIASTOLIC BLOOD PRESSURE: 65 MMHG | HEART RATE: 58 BPM

## 2018-05-08 DIAGNOSIS — Z01.30 BLOOD PRESSURE CHECK: Primary | ICD-10-CM

## 2018-05-08 PROCEDURE — 99213 OFFICE O/P EST LOW 20 MIN: CPT | Mod: PBBFAC,PO

## 2018-05-08 PROCEDURE — 99499 UNLISTED E&M SERVICE: CPT | Mod: S$PBB,,, | Performed by: FAMILY MEDICINE

## 2018-05-08 PROCEDURE — 99999 PR PBB SHADOW E&M-EST. PATIENT-LVL III: CPT | Mod: PBBFAC,,,

## 2018-06-08 ENCOUNTER — TELEPHONE (OUTPATIENT)
Dept: FAMILY MEDICINE | Facility: CLINIC | Age: 68
End: 2018-06-08

## 2018-06-08 NOTE — TELEPHONE ENCOUNTER
Patient was in [place] for pneumonia, discharged Saturday.  Bp running low at times, 80/50s, goes back to normal, no high readings, should he take his 2nd dose of BP meds, when BP low.  Reports he is always dizzy, even when BP normal.  Has Hospital follow up appt Monday at 1440, any recommendations until then, please advise.

## 2018-06-08 NOTE — TELEPHONE ENCOUNTER
----- Message from Jenny Asencio sent at 6/8/2018  9:15 AM CDT -----  Pt is requesting a call from nurse to discuss questions regarding blood pressure and medications. Pt says blood pressure is elevated.           Please call pt back at [phone]

## 2018-06-11 ENCOUNTER — OFFICE VISIT (OUTPATIENT)
Dept: FAMILY MEDICINE | Facility: CLINIC | Age: 68
End: 2018-06-11
Payer: MEDICARE

## 2018-06-11 VITALS
SYSTOLIC BLOOD PRESSURE: 95 MMHG | DIASTOLIC BLOOD PRESSURE: 60 MMHG | HEIGHT: 72 IN | WEIGHT: 166 LBS | TEMPERATURE: 98 F | BODY MASS INDEX: 22.48 KG/M2 | HEART RATE: 71 BPM

## 2018-06-11 DIAGNOSIS — I10 ESSENTIAL HYPERTENSION: ICD-10-CM

## 2018-06-11 DIAGNOSIS — J42 CHRONIC BRONCHITIS, UNSPECIFIED CHRONIC BRONCHITIS TYPE: ICD-10-CM

## 2018-06-11 DIAGNOSIS — J18.9 LINGULAR PNEUMONIA: Primary | ICD-10-CM

## 2018-06-11 DIAGNOSIS — J43.9 PULMONARY EMPHYSEMA, UNSPECIFIED EMPHYSEMA TYPE: ICD-10-CM

## 2018-06-11 DIAGNOSIS — F11.20 OPIOID DEPENDENCE IN CONTROLLED ENVIRONMENT: ICD-10-CM

## 2018-06-11 DIAGNOSIS — I95.9 HYPOTENSION, UNSPECIFIED HYPOTENSION TYPE: ICD-10-CM

## 2018-06-11 PROCEDURE — 99214 OFFICE O/P EST MOD 30 MIN: CPT | Mod: S$PBB,,, | Performed by: FAMILY MEDICINE

## 2018-06-11 PROCEDURE — 99214 OFFICE O/P EST MOD 30 MIN: CPT | Mod: PBBFAC,PO | Performed by: FAMILY MEDICINE

## 2018-06-11 PROCEDURE — 99999 PR PBB SHADOW E&M-EST. PATIENT-LVL IV: CPT | Mod: PBBFAC,,, | Performed by: FAMILY MEDICINE

## 2018-06-11 RX ORDER — ESCITALOPRAM OXALATE 20 MG/1
10 TABLET ORAL
COMMUNITY
End: 2018-12-07

## 2018-06-11 RX ORDER — METHADONE HYDROCHLORIDE 10 MG/5ML
50 SOLUTION ORAL
COMMUNITY
End: 2019-03-28

## 2018-06-11 RX ORDER — TIOTROPIUM BROMIDE 18 UG/1
18 CAPSULE ORAL; RESPIRATORY (INHALATION)
COMMUNITY
Start: 2018-06-06

## 2018-06-11 NOTE — PROGRESS NOTES
Follow-up hospitalization as below.  Slowly improving.  No fever chills.  Some cough.  He did see Pulmonary in follow-up and had chest x-ray which has not completely cleared yet.  He has follow-up x-ray appointment scheduled again next month with Pulmonary.  His blood pressure is on the low side.  Taking benazepril twice a day.  He is on methadone therapy through outside facility for opioid dependence states compliance.    Emely Platt NP - 05/26/2018 1:55 PM CDT  Formatting of this note may be different from the original.  [facility] DISCHARGE SUMMARY    Patient ID:  Ilana Carr  [id number]  67 y.o.  1950    Admit Date:   5/24/2018 1:28 AM    Discharge Date:   Discharge Today: 5/26/2018    Admitting Physician:   Ata Zaragoza MD     Discharge Physician:   EMELY PLATT NP    Consults:  Consultants   Provider Service Role Specialty   Kate Cancino MD - Consulting Physician Pulmonary Disease   Breann Gallego MD Cardiology Consulting Physician Interventional Cardiology   Emely Platt NP - Nurse Practitioner Nurse Practitioner Family   Bailey Elliott NP - Nurse Practitioner Nurse Practitioner Family       Reason for Admission/Admission Diagnoses:   Present on Admission:   COPD exacerbation (HCC)   CAD (coronary artery disease), native coronary artery   Essential hypertension, benign    Discharge Diagnoses:   Active Hospital Problems   Diagnosis Date Noted    COPD exacerbation (HCC) 05/24/2018    Chest pain, unspecified type    Pneumonia of left upper lobe due to infectious organism (HCC)    CAD (coronary artery disease), native coronary artery 09/09/2016    Essential hypertension, benign 09/03/2013     Resolved Hospital Problems   Diagnosis Date Noted Date Resolved     History of Present Illness:     Ilana Carr is a 67 y.o. male who presents with complaints of chest pain and shortness of breath. He carries an extensive past medical history including acid reflux, alcohol  abuse but last drinking 30 years ago, anemia, anxiety and depression, arthritis, skin cancer, COPD, CAD with MI 2016 and status post CABG 2016, hyperlipidemia, hypertension, hepatitis C treated with Interferon and Harvoni, sleep difficulties and TIA 2013.     Mr. Carr reports this morning developing shortness of breath and a gripping-type pain to the left lower chest last evening. No associated nausea or emesis. Chest discomfort nonradiating. No diaphoresis. He reports left chest hurts with inspiration. Holding pressure to the chest decreases his chest discomfort.  Reported feeling feverish and had fever of 101.5 in the emergency room. Denies ENT complaints. No cough but experiences chronic wheezing from a COPD. Denies palpitations or lower extremity edema.     Initial set of cardiac enzymes negative for ACS.     Hospital Course and Treatment:   Admission Information   Date & Time  5/24/2018 Provider  Ata Zaragoza MD Department  [facility] Dept. Phone  [phone]       Mr. Carr presented with concerns about possible heart attack. He was experiencing left-sided chest discomfort just below the nipple in one area. Cardiac enzymes were obtained and were negative ×3. Cardiology was consulted who did not recommend any additional cardiac workup. Chest pain atypical and more pleuritic in nature.    CT chest revealed possible lingula pneumonia. White count remained within normal limits. Neutrophil count initially was 78.0 by on discharge is 65.0. CT angiogram PE protocol impression no pulmonary embolism. Airspace opacity in the lingula could reflect atelectasis or pneumonia. He was prescribed Rocephin and azithromycin IV and discharged home to continue Omnicef 300 mg twice a day. Levaquin was considered but due to interaction with methadone and possible QT syndrome Levaquin was not prescribed. Influenza swab was negative.    He carries a history of COPD/emphysema. Spiriva  was added. No obstructive physiology on exam. He was not prescribed systemic steroids. Former smoker and discontinued smoking 2016 after 53-pack-year history.    Hypomagnesemia was noted and replacement provided. Magnesium level on discharge was within normal limits.    I discussed with the patient disease process and treatment.     I have personally seen and examined the patient, Ilana Carr, in a face to face encounter on the date of discharge.     He is cleared for discharge with instructions to follow up as directed.   Total time in the care and discharge planning of this patient was greater than 30 minutes.       Past Medical History:  Past Medical History:   Diagnosis Date    Alcoholism in remission     Anxiety     CAD (coronary artery disease) 9/2/2016    Chronic back pain     pain mgt    Chronic hepatitis C without hepatic coma 1/8/2016    Colon polyps     COPD (chronic obstructive pulmonary disease)     Depressed left ventricular systolic function 9/6/2016    Depression     Diverticulosis     DJD (degenerative joint disease) of hip     Emphysema of lung     Esophageal ulcer with bleeding     GERD (gastroesophageal reflux disease)     Hepatitis C     completed Interferon therapy    Hiatal hernia     Hyperlipidemia with target LDL less than 100 1/7/2015    Hypertension     Hypertriglyceridemia     Opioid dependence in controlled environment 4/16/2018    Osteomyelitis of right foot     Peripheral arterial disease     PTSD (post-traumatic stress disorder)     Spells     last Sept 2013, not sure if it was TIA v seizure    Squamous cell skin cancer     Testosterone deficiency     Tubular adenoma of colon      Past Surgical History:   Procedure Laterality Date    CARDIAC CATHETERIZATION      COLONOSCOPY  2008    COLONOSCOPY N/A 1/8/2016    Procedure: COLONOSCOPY;  Surgeon: Amauri Delatorre MD;  Location: [facility];  Service: Endoscopy;  Laterality: N/A;    CORONARY ARTERY BYPASS GRAFT   09/09/2016    FOOT SURGERY Right 12/6/2013    fifth metatarsal head resection [Other]    ORIF WRIST FRACTURE      LT    SHOULDER SURGERY      left    TONSILLECTOMY      UPPER GASTROINTESTINAL ENDOSCOPY  7/7/2016, 2010     Social History     Social History    Marital status:      Spouse name: N/A    Number of children: N/A    Years of education: N/A     Occupational History    Not on file.     Social History Main Topics    Smoking status: Former Smoker     Packs/day: 1.50     Years: 40.00     Types: Cigarettes, Cigars     Quit date: 9/9/2016    Smokeless tobacco: Former User     Quit date: 9/9/2016    Alcohol use No      Comment: quit 30 y- prior alcoholism    Drug use: Yes     Types: Oxycodone    Sexual activity: Not on file     Other Topics Concern    Not on file     Social History Narrative    No narrative on file     Family History   Problem Relation Age of Onset    Lung cancer Mother     Hypertension Mother     Hypertension Father     Arrhythmia Brother 60    Colon cancer Neg Hx     Stomach cancer Neg Hx      Review of patient's allergies indicates:   Allergen Reactions    No known drug allergies      Current Outpatient Prescriptions on File Prior to Visit   Medication Sig Dispense Refill    albuterol 90 mcg/actuation inhaler Inhale 2 puffs into the lungs every 6 (six) hours as needed for Wheezing or Shortness of Breath. 18 g 5    aspirin (ECOTRIN) 81 MG EC tablet Take 81 mg by mouth once daily.      atorvastatin (LIPITOR) 20 MG tablet Take 1 tablet (20 mg total) by mouth once daily. (Patient taking differently: Take 10 mg by mouth once daily. ) 90 tablet 2    benazepril (LOTENSIN) 20 MG tablet Take 20 mg by mouth every evening.      budesonide-formoterol 80-4.5 mcg (SYMBICORT) 80-4.5 mcg/actuation HFAA Inhale 2 puffs into the lungs 2 (two) times daily. Controller       buPROPion (WELLBUTRIN XL) 300 MG 24 hr tablet Take 300 mg by mouth once daily.      carvedilol (COREG) 12.5  MG tablet Take 1 tablet (12.5 mg total) by mouth 2 (two) times daily with meals. 180 tablet 2    clonazePAM (KLONOPIN) 1 MG tablet Take 1 tablet (1 mg total) by mouth 2 (two) times daily. (Patient taking differently: Take 1 mg by mouth as needed. ) 60 tablet 1    coenzyme Q10 (CO Q-10) 100 mg capsule Take 100 mg by mouth once daily.      hydroCHLOROthiazide (HYDRODIURIL) 25 MG tablet Take 0.5 tablets (12.5 mg total) by mouth once daily.      krill-om3-dha-epa-om6-lip-astx 1,500-165-67.5 mg Cap Take 1 capsule by mouth.      LACTOBAC NO.41/BIFIDOBACT NO.7 (PROBIOTIC-10 ORAL) Take 10 mg by mouth once daily.      multivitamin with minerals tablet Take 1 tablet by mouth.      zolpidem (AMBIEN) 10 mg Tab Take 10 mg by mouth nightly as needed.      omeprazole (PRILOSEC) 40 MG capsule Take 1 capsule (40 mg total) by mouth once daily. 90 capsule 1    [DISCONTINUED] METHADONE HCL (METHADONE ORAL) Take 40 mg by mouth once daily.       No current facility-administered medications on file prior to visit.            ROS:  GENERAL: No fever, chills,  or significant weight changes.   CARDIOVASCULAR: Denies chest pain, PND, orthopnea or reduced exercise tolerance.  ABDOMEN: Appetite fine. Denies diarrhea, abdominal pain, hematemesis or blood in stool.  URINARY: No flank pain, dysuria or hematuria.      OBJECTIVE:     Vitals:    06/11/18 1442   BP: 95/60   Pulse: 71   Temp: 98 °F (36.7 °C)   Weight: 75.3 kg (166 lb)   Height: 6' (1.829 m)     Wt Readings from Last 3 Encounters:   06/11/18 75.3 kg (166 lb)   04/16/18 78.5 kg (173 lb)   04/02/18 78.5 kg (173 lb)     APPEARANCE: Well nourished, well developed, in no acute distress.    HEAD: Normocephalic.  Atraumatic.  No sinus tenderness.  EYES:   Right eye: Pupil reactive.  Conjunctiva clear.    Left eye: Pupil reactive.  Conjunctiva clear.     EOMI.    EARS: TM's intact. Light reflex normal. No retraction or perforation.    NOSE:  clear.  MOUTH & THROAT:  No pharyngeal  erythema or exudate. No lesions.  NECK: Supple. No bruits.  No JVD.  No cervical lymphadenopathy.  No thyromegaly.    CHEST:  He has some crackles at the lingular area on the left  Normal respiratory effort  CARDIOVASCULAR: Normal rate.  Regular rhythm.  No murmurs.  No rub.  No gallops.  PERIPHERAL VASCULAR: No cyanosis.  No clubbing.  No edema.  NEUROLOGIC: No focal findings.  MENTAL STATUS: Alert.  Oriented x 3.        Ilana was seen today for transitional care.    Diagnoses and all orders for this visit:    Lingular pneumonia    Pulmonary emphysema, unspecified emphysema type    Chronic bronchitis, unspecified chronic bronchitis type    Essential hypertension    Hypotension, unspecified hypotension type    Opioid dependence in controlled environment      Continue her medication.  Keep follow-up as scheduled with Pulmonary.  Avoid alcohol or smoking.  Will have him decrease his benazepril down once a day.  Monitor blood pressure.  Continue other medications as currently

## 2018-06-21 ENCOUNTER — NURSE TRIAGE (OUTPATIENT)
Dept: ADMINISTRATIVE | Facility: CLINIC | Age: 68
End: 2018-06-21

## 2018-06-21 NOTE — TELEPHONE ENCOUNTER
Reason for Disposition   Nursing judgment, per information in Reference    Protocols used: ST NO GUIDELINE AVAILABLE - ADVICE PER [id number]    Pt calling regarding feeling balance off.  Care advice given.

## 2018-09-21 ENCOUNTER — TELEPHONE (OUTPATIENT)
Dept: CARDIOLOGY | Facility: CLINIC | Age: 68
End: 2018-09-21

## 2018-09-21 NOTE — TELEPHONE ENCOUNTER
I called and left v/m that Dr House's schedule has changed and I need to move his appt. I need to do this today so I am moving his appt to the following tues oct 2nd for 10 am. Please call if this will not work for you so we can make arrangements that will. nurys

## 2018-10-01 DIAGNOSIS — I73.9 PAD (PERIPHERAL ARTERY DISEASE): Primary | ICD-10-CM

## 2018-10-02 ENCOUNTER — CLINICAL SUPPORT (OUTPATIENT)
Dept: CARDIOLOGY | Facility: CLINIC | Age: 68
End: 2018-10-02
Payer: MEDICARE

## 2018-10-02 ENCOUNTER — OFFICE VISIT (OUTPATIENT)
Dept: CARDIOLOGY | Facility: CLINIC | Age: 68
End: 2018-10-02
Payer: MEDICARE

## 2018-10-02 VITALS
WEIGHT: 174.19 LBS | HEIGHT: 73 IN | HEART RATE: 55 BPM | SYSTOLIC BLOOD PRESSURE: 133 MMHG | BODY MASS INDEX: 23.09 KG/M2 | DIASTOLIC BLOOD PRESSURE: 70 MMHG

## 2018-10-02 DIAGNOSIS — E78.5 HYPERLIPIDEMIA WITH TARGET LDL LESS THAN 100: ICD-10-CM

## 2018-10-02 DIAGNOSIS — Z87.891 EX-SMOKER: ICD-10-CM

## 2018-10-02 DIAGNOSIS — Z95.1 HX OF CABG: ICD-10-CM

## 2018-10-02 DIAGNOSIS — E78.1 HYPERTRIGLYCERIDEMIA: ICD-10-CM

## 2018-10-02 DIAGNOSIS — I25.10 CORONARY ARTERY DISEASE INVOLVING NATIVE CORONARY ARTERY OF NATIVE HEART WITHOUT ANGINA PECTORIS: Primary | ICD-10-CM

## 2018-10-02 DIAGNOSIS — J44.9 MODERATE COPD (CHRONIC OBSTRUCTIVE PULMONARY DISEASE): ICD-10-CM

## 2018-10-02 DIAGNOSIS — I10 ESSENTIAL HYPERTENSION: ICD-10-CM

## 2018-10-02 DIAGNOSIS — I73.9 PAD (PERIPHERAL ARTERY DISEASE): ICD-10-CM

## 2018-10-02 PROCEDURE — 99214 OFFICE O/P EST MOD 30 MIN: CPT | Mod: S$PBB,,, | Performed by: INTERNAL MEDICINE

## 2018-10-02 PROCEDURE — 93010 ELECTROCARDIOGRAM REPORT: CPT | Mod: S$PBB,,, | Performed by: INTERNAL MEDICINE

## 2018-10-02 PROCEDURE — 93005 ELECTROCARDIOGRAM TRACING: CPT | Mod: PBBFAC,PO | Performed by: INTERNAL MEDICINE

## 2018-10-02 PROCEDURE — 99999 PR PBB SHADOW E&M-EST. PATIENT-LVL III: CPT | Mod: PBBFAC,,, | Performed by: INTERNAL MEDICINE

## 2018-10-02 PROCEDURE — 99213 OFFICE O/P EST LOW 20 MIN: CPT | Mod: PBBFAC,25,PO | Performed by: INTERNAL MEDICINE

## 2018-10-02 NOTE — PROGRESS NOTES
Subjective:   Patient ID:  Ilana Carr is a 68 y.o. male who presents for follow-up of Follow-up  Patient denies CP, angina or anginal equivalent.CABG 2016.    Hypertension   This is a chronic problem. The current episode started more than 1 year ago. Pertinent negatives include no chest pain, palpitations or shortness of breath. Past treatments include beta blockers. The current treatment provides moderate improvement. There are no compliance problems.    Hyperlipidemia   This is a chronic problem. The current episode started more than 1 year ago. The problem is controlled. Recent lipid tests were reviewed and are variable. Pertinent negatives include no chest pain or shortness of breath. Current antihyperlipidemic treatment includes statins. The current treatment provides moderate improvement of lipids. There are no compliance problems.    Coronary Artery Disease   Presents for follow-up visit. Pertinent negatives include no chest pain, chest pressure, chest tightness, dizziness, leg swelling, muscle weakness, palpitations, shortness of breath or weight gain. Risk factors include hyperlipidemia. The symptoms have been stable. Compliance with diet is variable. Compliance with exercise is variable. Compliance with medications is good.       Review of Systems   Constitution: Negative. Negative for weight gain.   HENT: Negative.    Eyes: Negative.    Cardiovascular: Negative.  Negative for chest pain, leg swelling and palpitations.   Respiratory: Negative.  Negative for chest tightness and shortness of breath.    Endocrine: Negative.    Hematologic/Lymphatic: Negative.    Skin: Negative.    Musculoskeletal: Negative for muscle weakness.   Gastrointestinal: Negative.    Genitourinary: Negative.    Neurological: Negative.  Negative for dizziness.   Psychiatric/Behavioral: Negative.    Allergic/Immunologic: Negative.      Family History   Problem Relation Age of Onset    Lung cancer Mother     Hypertension Mother      Hypertension Father     Arrhythmia Brother 60    Colon cancer Neg Hx     Stomach cancer Neg Hx      Past Medical History:   Diagnosis Date    Alcoholism in remission     Anxiety     CAD (coronary artery disease) 9/2/2016    Chronic back pain     pain mgt    Chronic hepatitis C without hepatic coma 1/8/2016    Colon polyps     COPD (chronic obstructive pulmonary disease)     Depressed left ventricular systolic function 9/6/2016    Depression     Diverticulosis     DJD (degenerative joint disease) of hip     Emphysema of lung     Esophageal ulcer with bleeding     GERD (gastroesophageal reflux disease)     Hepatitis C     completed Interferon therapy    Hiatal hernia     Hyperlipidemia with target LDL less than 100 1/7/2015    Hypertension     Hypertriglyceridemia     Opioid dependence in controlled environment 4/16/2018    Osteomyelitis of right foot     Peripheral arterial disease     PTSD (post-traumatic stress disorder)     Spells     last Sept 2013, not sure if it was TIA v seizure    Squamous cell skin cancer     Testosterone deficiency     Tubular adenoma of colon      Current Outpatient Medications on File Prior to Visit   Medication Sig Dispense Refill    albuterol 90 mcg/actuation inhaler Inhale 2 puffs into the lungs every 6 (six) hours as needed for Wheezing or Shortness of Breath. 18 g 5    aspirin (ECOTRIN) 81 MG EC tablet Take 81 mg by mouth once daily.      atorvastatin (LIPITOR) 20 MG tablet Take 1 tablet (20 mg total) by mouth once daily. (Patient taking differently: Take 10 mg by mouth once daily. ) 90 tablet 2    benazepril (LOTENSIN) 20 MG tablet Take 20 mg by mouth every evening.      budesonide-formoterol 80-4.5 mcg (SYMBICORT) 80-4.5 mcg/actuation HFAA Inhale 2 puffs into the lungs 2 (two) times daily. Controller       buPROPion (WELLBUTRIN XL) 300 MG 24 hr tablet Take 300 mg by mouth once daily.      carvedilol (COREG) 12.5 MG tablet Take 1 tablet (12.5  mg total) by mouth 2 (two) times daily with meals. 180 tablet 2    clonazePAM (KLONOPIN) 1 MG tablet Take 1 tablet (1 mg total) by mouth 2 (two) times daily. (Patient taking differently: Take 1 mg by mouth as needed. ) 60 tablet 1    coenzyme Q10 (CO Q-10) 100 mg capsule Take 100 mg by mouth once daily.      escitalopram oxalate (LEXAPRO) 20 MG tablet Take 10 mg by mouth.      hydroCHLOROthiazide (HYDRODIURIL) 25 MG tablet Take 0.5 tablets (12.5 mg total) by mouth once daily.      krill-om3-dha-epa-om6-lip-astx 1,500-165-67.5 mg Cap Take 1 capsule by mouth.      LACTOBAC NO.41/BIFIDOBACT NO.7 (PROBIOTIC-10 ORAL) Take 10 mg by mouth once daily.      methadone (DOLOPHINE) 10 mg/5 mL solution Take 45 mg by mouth.      multivitamin with minerals tablet Take 1 tablet by mouth.      nebulizer accessories Kit by Miscellaneous route. Use as directed      omeprazole (PRILOSEC) 40 MG capsule Take 1 capsule (40 mg total) by mouth once daily. 90 capsule 1    tiotropium (SPIRIVA) 18 mcg inhalation capsule Inhale 18 mcg into the lungs.      zolpidem (AMBIEN) 10 mg Tab Take 10 mg by mouth nightly as needed.       No current facility-administered medications on file prior to visit.      Review of patient's allergies indicates:   Allergen Reactions    No known drug allergies        Objective:     Physical Exam   Constitutional: He is oriented to person, place, and time. He appears well-developed and well-nourished.   HENT:   Head: Normocephalic and atraumatic.   Eyes: Conjunctivae are normal. Pupils are equal, round, and reactive to light.   Neck: Normal range of motion. Neck supple.   Cardiovascular: Normal rate, regular rhythm, normal heart sounds and intact distal pulses.   Pulmonary/Chest: Effort normal and breath sounds normal.   Abdominal: Soft. Bowel sounds are normal.   Neurological: He is alert and oriented to person, place, and time.   Skin: Skin is warm and dry.   Psychiatric: He has a normal mood and affect.    Nursing note and vitals reviewed.      Assessment:     1. Coronary artery disease involving native coronary artery of native heart without angina pectoris    2. Hypertriglyceridemia    3. Essential hypertension    4. Hyperlipidemia with target LDL less than 100    5. Moderate COPD (chronic obstructive pulmonary disease)    6. Hx of CABG        Plan:     Coronary artery disease involving native coronary artery of native heart without angina pectoris    Hypertriglyceridemia    Essential hypertension    Hyperlipidemia with target LDL less than 100    Moderate COPD (chronic obstructive pulmonary disease)    Hx of CABG      Continue asa- cad  Continue coreg, hctz, benazapril-htn  Continue statin-hlp

## 2018-11-12 ENCOUNTER — PES CALL (OUTPATIENT)
Dept: ADMINISTRATIVE | Facility: CLINIC | Age: 68
End: 2018-11-12

## 2018-11-13 ENCOUNTER — CLINICAL SUPPORT (OUTPATIENT)
Dept: CARDIOLOGY | Facility: CLINIC | Age: 68
End: 2018-11-13
Attending: INTERNAL MEDICINE
Payer: MEDICARE

## 2018-11-13 ENCOUNTER — LAB VISIT (OUTPATIENT)
Dept: LAB | Facility: HOSPITAL | Age: 68
End: 2018-11-13
Attending: INTERNAL MEDICINE
Payer: MEDICARE

## 2018-11-13 DIAGNOSIS — Z87.891 EX-SMOKER: ICD-10-CM

## 2018-11-13 DIAGNOSIS — E78.5 HYPERLIPIDEMIA WITH TARGET LDL LESS THAN 100: ICD-10-CM

## 2018-11-13 LAB
ALBUMIN SERPL BCP-MCNC: 4.3 G/DL
ALP SERPL-CCNC: 69 U/L
ALT SERPL W/O P-5'-P-CCNC: 21 U/L
AST SERPL-CCNC: 21 U/L
BILIRUB DIRECT SERPL-MCNC: 0.3 MG/DL
BILIRUB SERPL-MCNC: 0.5 MG/DL
CHOLEST SERPL-MCNC: 151 MG/DL
CHOLEST/HDLC SERPL: 3 {RATIO}
HDLC SERPL-MCNC: 51 MG/DL
HDLC SERPL: 33.8 %
LDLC SERPL CALC-MCNC: 81 MG/DL
NONHDLC SERPL-MCNC: 100 MG/DL
PROT SERPL-MCNC: 8.6 G/DL
TRIGL SERPL-MCNC: 95 MG/DL
VASCULAR ANKLE BRACHIAL INDEX (ABI) LEFT: 1.03 (ref 0.9–1.2)

## 2018-11-13 PROCEDURE — 80076 HEPATIC FUNCTION PANEL: CPT

## 2018-11-13 PROCEDURE — 36415 COLL VENOUS BLD VENIPUNCTURE: CPT | Mod: PO

## 2018-11-13 PROCEDURE — 80061 LIPID PANEL: CPT

## 2018-11-13 PROCEDURE — 93922 UPR/L XTREMITY ART 2 LEVELS: CPT | Mod: PBBFAC,PO | Performed by: INTERNAL MEDICINE

## 2018-11-14 ENCOUNTER — TELEPHONE (OUTPATIENT)
Dept: CARDIOLOGY | Facility: CLINIC | Age: 68
End: 2018-11-14

## 2018-11-14 NOTE — TELEPHONE ENCOUNTER
11/14 Called and left v/m I was calling with his test results. Will call him later today. Cm  ----- Message from Gil House MD sent at 11/13/2018  8:44 PM CST -----  Please tell pt lipids are at goal

## 2018-11-14 NOTE — TELEPHONE ENCOUNTER
11/14 Called and left a v/m that I was calling with test results and will call back later today. Cm  ----- Message from Gil House MD sent at 11/13/2018  8:44 PM CST -----  Please tell pt shelli is nml

## 2018-11-15 ENCOUNTER — TELEPHONE (OUTPATIENT)
Dept: CARDIOLOGY | Facility: CLINIC | Age: 68
End: 2018-11-15

## 2018-11-15 NOTE — TELEPHONE ENCOUNTER
11/15 Called and left v/m that Dr House rev'd his labs. Chol at goal. Continue diet and medication. Follow up as scheduled. Cm    ----- Message from Gil House MD sent at 11/13/2018  8:44 PM CST -----  Please tell pt lipids are at goal

## 2018-12-07 ENCOUNTER — TELEPHONE (OUTPATIENT)
Dept: FAMILY MEDICINE | Facility: CLINIC | Age: 68
End: 2018-12-07

## 2018-12-07 ENCOUNTER — PATIENT MESSAGE (OUTPATIENT)
Dept: FAMILY MEDICINE | Facility: CLINIC | Age: 68
End: 2018-12-07

## 2018-12-07 ENCOUNTER — OFFICE VISIT (OUTPATIENT)
Dept: FAMILY MEDICINE | Facility: CLINIC | Age: 68
End: 2018-12-07
Payer: MEDICARE

## 2018-12-07 VITALS
WEIGHT: 173.81 LBS | DIASTOLIC BLOOD PRESSURE: 67 MMHG | SYSTOLIC BLOOD PRESSURE: 110 MMHG | HEIGHT: 72 IN | BODY MASS INDEX: 23.54 KG/M2 | TEMPERATURE: 98 F | HEART RATE: 62 BPM

## 2018-12-07 DIAGNOSIS — Z95.1 HX OF CABG: ICD-10-CM

## 2018-12-07 DIAGNOSIS — E78.1 HYPERTRIGLYCERIDEMIA: ICD-10-CM

## 2018-12-07 DIAGNOSIS — Z87.891 EX-SMOKER: ICD-10-CM

## 2018-12-07 DIAGNOSIS — J43.2 CENTRILOBULAR EMPHYSEMA: ICD-10-CM

## 2018-12-07 DIAGNOSIS — G89.29 CHRONIC BACK PAIN, UNSPECIFIED BACK LOCATION, UNSPECIFIED BACK PAIN LATERALITY: ICD-10-CM

## 2018-12-07 DIAGNOSIS — I25.10 CORONARY ARTERY DISEASE INVOLVING NATIVE CORONARY ARTERY OF NATIVE HEART WITHOUT ANGINA PECTORIS: ICD-10-CM

## 2018-12-07 DIAGNOSIS — F43.10 PTSD (POST-TRAUMATIC STRESS DISORDER): ICD-10-CM

## 2018-12-07 DIAGNOSIS — J44.9 MODERATE COPD (CHRONIC OBSTRUCTIVE PULMONARY DISEASE): ICD-10-CM

## 2018-12-07 DIAGNOSIS — I10 ESSENTIAL HYPERTENSION: ICD-10-CM

## 2018-12-07 DIAGNOSIS — F41.9 ANXIETY: ICD-10-CM

## 2018-12-07 DIAGNOSIS — Z86.19 HISTORY OF HEPATITIS C: ICD-10-CM

## 2018-12-07 DIAGNOSIS — K63.5 POLYP OF COLON, UNSPECIFIED PART OF COLON, UNSPECIFIED TYPE: ICD-10-CM

## 2018-12-07 DIAGNOSIS — F32.9 MAJOR DEPRESSIVE DISORDER WITH SINGLE EPISODE, REMISSION STATUS UNSPECIFIED: ICD-10-CM

## 2018-12-07 DIAGNOSIS — F10.21 ALCOHOLISM IN REMISSION: ICD-10-CM

## 2018-12-07 DIAGNOSIS — F11.20 OPIOID DEPENDENCE IN CONTROLLED ENVIRONMENT: ICD-10-CM

## 2018-12-07 DIAGNOSIS — I25.5 ISCHEMIC CARDIOMYOPATHY: ICD-10-CM

## 2018-12-07 DIAGNOSIS — M54.9 CHRONIC BACK PAIN, UNSPECIFIED BACK LOCATION, UNSPECIFIED BACK PAIN LATERALITY: ICD-10-CM

## 2018-12-07 DIAGNOSIS — E78.5 HYPERLIPIDEMIA WITH TARGET LDL LESS THAN 100: ICD-10-CM

## 2018-12-07 DIAGNOSIS — I73.9 PERIPHERAL ARTERIAL DISEASE: ICD-10-CM

## 2018-12-07 DIAGNOSIS — E34.9 TESTOSTERONE DEFICIENCY: ICD-10-CM

## 2018-12-07 PROCEDURE — 99213 OFFICE O/P EST LOW 20 MIN: CPT | Mod: PBBFAC,PO | Performed by: NURSE PRACTITIONER

## 2018-12-07 PROCEDURE — G0439 PPPS, SUBSEQ VISIT: HCPCS | Mod: ,,, | Performed by: NURSE PRACTITIONER

## 2018-12-07 PROCEDURE — 99999 PR PBB SHADOW E&M-EST. PATIENT-LVL III: CPT | Mod: PBBFAC,,, | Performed by: NURSE PRACTITIONER

## 2018-12-07 RX ORDER — ALBUTEROL SULFATE 90 UG/1
2 AEROSOL, METERED RESPIRATORY (INHALATION) EVERY 6 HOURS PRN
Qty: 18 G | Refills: 5 | Status: SHIPPED | OUTPATIENT
Start: 2018-12-07

## 2018-12-07 NOTE — ASSESSMENT & PLAN NOTE
[facility]  Daily Progress Note        Interval History:   Date and time of birth: 2018  9:34 AM    Stable, no new events    Feeding: Breast feeding going well     I & O for past 24 hours  No data found.    Patient Vitals for the past 24 hrs:   Quality of Breastfeed   11/10/17 1130 Attempted breastfeed   11/10/17 1426 Attempted breastfeed   11/10/17 1430 Good breastfeed   11/10/17 1730 Good breastfeed   17 0000 Excellent breastfeed   17 0315 Excellent breastfeed   17 0615 Excellent breastfeed     Patient Vitals for the past 24 hrs:   Urine Occurrence Stool Occurrence   11/10/17 1145 1 -   11/10/17 1730 - 1   17 0315 1 1              Physical Exam:   Vital Signs:  Patient Vitals for the past 24 hrs:   Temp Temp src Heart Rate Resp Weight   17 0935 98.9  F (37.2  C) Axillary 144 40 -   11/10/17 2345 98.9  F (37.2  C) Axillary 140 50 2.668 kg (5 lb 14.1 oz)   11/10/17 2100 98.1  F (36.7  C) Axillary - - -   11/10/17 1730 98.2  F (36.8  C) Axillary 132 56 -   11/10/17 1500 98.7  F (37.1  C) Axillary - - -     Wt Readings from Last 3 Encounters:   11/10/17 2.668 kg (5 lb 14.1 oz) (5 %)*     * Growth percentiles are based on WHO (Boys, 0-2 years) data.       Weight change since birth: -8%    Skin:  no abnormal markings; normal color without significant rash.  Mild jaundice diffusely. Head/Neck:  normal anterior and posterior fontanelle, intact scalp; Neck without masses  Eyes:  normal red reflex, clear conjunctiva  Ears/Nose/Mouth:  intact canals, patent nares, mouth normal  Thorax:  normal contour, clavicles intact  Lungs:  clear, no retractions, no increased work of breathing  Heart:  normal rate, rhythm.  No murmurs.  Normal femoral pulses.  Abdomen:  soft without mass, tenderness, organomegaly, hernia.  Umbilicus normal.  Genitalia:  normal male external genitalia with testes descended bilaterally  Anus:  patent  Trunk/spine:  straight,  Stable   Continue current treatment plan as previously prescribed with your PCP     intact  Muskuloskeletal:  Normal Mullen and Ortolani maneuvers.  intact without deformity.  Normal digits.  Neurologic:  normal, symmetric tone and strength.  normal reflexes.         Laboratory Results:   No results found for this or any previous visit (from the past 24 hour(s)).    No results for input(s): BILINEONATAL in the last 168 hours.      Recent Labs  Lab 17  0950   TCBIL 4.2        bilitool         Assessment and Plan:   Assessment:   3 day old male , doing well.       Plan:   -Normal  care           Netta Burch

## 2018-12-07 NOTE — ASSESSMENT & PLAN NOTE
Still in remission-no alcohol in 35 years  Stable   Continue current treatment plan as previously prescribed with [facility] and [facility]

## 2018-12-07 NOTE — ASSESSMENT & PLAN NOTE
Stable at this time  Continue medication as prescribed  Continue current treatment plan as previously prescribed with your PCP and pulmonologist, Dr. Schreiber

## 2018-12-07 NOTE — ASSESSMENT & PLAN NOTE
Stable   Continue medication as prescribed  Continue current treatment plan as previously prescribed with your [facility] and [facility]

## 2018-12-07 NOTE — TELEPHONE ENCOUNTER
----- Message from Colleen Wells sent at 12/7/2018  9:58 AM CST -----  Contact: pt   States he's calling rg wanting to have the nurse check her [facility] from the Pt and can be reached at [phone]//thanks/dbw

## 2018-12-07 NOTE — ASSESSMENT & PLAN NOTE
Stable   Continue medication as prescribed  Continue current treatment plan as previously prescribed with your PCP and cardiologist, Dr. House

## 2018-12-07 NOTE — ASSESSMENT & PLAN NOTE
Stable   Continue medication as prescribed  Continue current treatment plan as previously prescribed with your PCP and pain management

## 2018-12-07 NOTE — PROGRESS NOTES
Ilana Carr presented for a  Medicare AWV and comprehensive Health Risk Assessment today. The following components were reviewed and updated:    · Medical history  · Family History  · Social history  · Allergies and Current Medications  · Health Risk Assessment  · Health Maintenance  · Care Team     ** See Completed Assessments for Annual Wellness Visit within the encounter summary.**       The following assessments were completed:  · Living Situation  · CAGE  · Depression Screening  · Timed Get Up and Go  · Whisper Test  · Cognitive Function Screening  · Nutrition Screening  · ADL Screening  · PAQ Screening    Vitals:    12/07/18 1104   BP: 110/67   Pulse: 62   Temp: 98.2 °F (36.8 °C)   TempSrc: Oral   Weight: 78.8 kg (173 lb 12.8 oz)   Height: 6' (1.829 m)     Body mass index is 23.57 kg/m².  Physical Exam   Constitutional: He is oriented to person, place, and time. He appears well-developed and well-nourished.   HENT:   Head: Normocephalic.   Eyes: Pupils are equal, round, and reactive to light.   Neck: Normal range of motion. Neck supple.   Cardiovascular: Normal rate, regular rhythm and normal heart sounds.   Pulmonary/Chest: Effort normal and breath sounds normal. No respiratory distress. He has no decreased breath sounds. He has no wheezes. He has no rhonchi. He has no rales.   Neurological: He is alert and oriented to person, place, and time.   Skin: Skin is warm and dry. No rash noted.   Psychiatric: He has a normal mood and affect. His behavior is normal. Judgment and thought content normal.   Vitals reviewed.        Diagnoses and health risks identified today and associated recommendations/orders:    CAD (coronary artery disease)  Stable   Continue medication as prescribed  Continue current treatment plan as previously prescribed with your PCP and cardiologist, Dr. Harsh Dennison of CABG  Stable   Continue medication as prescribed  Continue current treatment plan as previously prescribed with your PCP  and cardiologist, Dr. House    Ischemic cardiomyopathy  Stable   Continue medication as prescribed  Continue current treatment plan as previously prescribed with your PCP and cardiologist, Dr. House       Hyperlipidemia with target LDL less than 100  Stable   Continue medication as prescribed  Continue current treatment plan as previously prescribed with your PCP and cardiologist, Dr. House    Peripheral arterial disease  Stable   Continue medication as prescribed  Continue current treatment plan as previously prescribed with your PCP and cardiologist, Dr. House    Hypertension  Stable   Continue medication as prescribed  Continue current treatment plan as previously prescribed with your PCP and cardiologist, Dr. House    Hypertriglyceridemia  Stable   Continue medication as prescribed  Continue current treatment plan as previously prescribed with your PCP and cardiologist, Dr. House    Moderate COPD (chronic obstructive pulmonary disease)  Stable at this time  Continue medication as prescribed  Continue current treatment plan as previously prescribed with your PCP and pulmonologist, Dr. Schreiber      Centrilobular emphysema  Stable at this time  Continue medication as prescribed  Continue current treatment plan as previously prescribed with your PCP and pulmonologist, Dr. Schreiber    Depression, major  Stable   Continue medication as prescribed  Continue current treatment plan as previously prescribed [facility]       Anxiety  Stable   Continue medication as prescribed  Continue current treatment plan as previously prescribed with [facility]     Alcoholism in remission  Still in remission-no alcohol in 35 years  Stable   Continue current treatment plan as previously prescribed with [facility]CP and [facility]      PTSD (post-traumatic stress disorder)  Stable   Continue medication as prescribed  Continue current treatment plan as previously prescribed with your PCP  and [facility]     Opioid dependence in controlled environment  Stable   Continue medication as prescribed  Continue current treatment plan as previously prescribed with [facility]     History of hepatitis C  Stable   Continue current treatment plan as previously prescribed with your PCP and GI      Testosterone deficiency  Stable   Continue current treatment plan as previously prescribed with your PCP      Colon polyps  Stable  Continue current treatment plan as previously prescribed with your PCP      Chronic back pain  Stable   Continue medication as prescribed  Continue current treatment plan as previously prescribed with your PCP and pain management    Ex-smoker  Stable   Continue current treatment plan as previously prescribed with your PCP        Provided Ilana with a 5-10 year written screening schedule and personal prevention plan. Recommendations were developed using the USPSTF age appropriate recommendations. Education, counseling, and referrals were provided as needed. After Visit Summary printed and given to patient which includes a list of additional screenings\tests needed.    Follow-up if symptoms worsen or fail to improve.    Christopher Davila NP

## 2019-01-08 ENCOUNTER — OFFICE VISIT (OUTPATIENT)
Dept: FAMILY MEDICINE | Facility: CLINIC | Age: 69
End: 2019-01-08
Payer: MEDICARE

## 2019-01-08 VITALS
SYSTOLIC BLOOD PRESSURE: 100 MMHG | HEIGHT: 72 IN | DIASTOLIC BLOOD PRESSURE: 67 MMHG | TEMPERATURE: 98 F | BODY MASS INDEX: 22.94 KG/M2 | HEART RATE: 67 BPM | WEIGHT: 169.38 LBS

## 2019-01-08 DIAGNOSIS — F32.5 MAJOR DEPRESSIVE DISORDER IN REMISSION, UNSPECIFIED WHETHER RECURRENT: ICD-10-CM

## 2019-01-08 DIAGNOSIS — F11.20 OPIOID DEPENDENCE IN CONTROLLED ENVIRONMENT: ICD-10-CM

## 2019-01-08 DIAGNOSIS — I73.9 CLAUDICATION OF LEFT LOWER EXTREMITY: ICD-10-CM

## 2019-01-08 DIAGNOSIS — I73.9 PERIPHERAL ARTERIAL DISEASE: Primary | ICD-10-CM

## 2019-01-08 DIAGNOSIS — J43.2 CENTRILOBULAR EMPHYSEMA: ICD-10-CM

## 2019-01-08 DIAGNOSIS — F43.10 PTSD (POST-TRAUMATIC STRESS DISORDER): ICD-10-CM

## 2019-01-08 DIAGNOSIS — F41.9 ANXIETY: ICD-10-CM

## 2019-01-08 DIAGNOSIS — F10.21 ALCOHOLISM IN REMISSION: ICD-10-CM

## 2019-01-08 DIAGNOSIS — J44.9 MODERATE COPD (CHRONIC OBSTRUCTIVE PULMONARY DISEASE): ICD-10-CM

## 2019-01-08 PROCEDURE — 99999 PR PBB SHADOW E&M-EST. PATIENT-LVL IV: CPT | Mod: PBBFAC,,, | Performed by: FAMILY MEDICINE

## 2019-01-08 PROCEDURE — 99999 PR PBB SHADOW E&M-EST. PATIENT-LVL IV: ICD-10-PCS | Mod: PBBFAC,,, | Performed by: FAMILY MEDICINE

## 2019-01-08 PROCEDURE — 99214 OFFICE O/P EST MOD 30 MIN: CPT | Mod: PBBFAC,PO | Performed by: FAMILY MEDICINE

## 2019-01-08 PROCEDURE — 99214 PR OFFICE/OUTPT VISIT, EST, LEVL IV, 30-39 MIN: ICD-10-PCS | Mod: S$PBB,,, | Performed by: FAMILY MEDICINE

## 2019-01-08 PROCEDURE — 99214 OFFICE O/P EST MOD 30 MIN: CPT | Mod: S$PBB,,, | Performed by: FAMILY MEDICINE

## 2019-01-08 NOTE — PROGRESS NOTES
Patient presents with a complaint of discomfort at the left calf associated with ambulation.  Starts with walking approximately 100 yd.  Describes a soreness with pins and needles.  Symptoms resolved with resting for about a minute.  He recently had ankle brachial index performed through Cardiology due to decreased pulse on the left foot.  The GAYATHRI was normal.  He denies back pain. He was felt to have mild peripheral arterial disease on previous GAYATHRI ultrasound in 2014 with some plaque.  He did see vascular surgery at that time, but no procedures were recommended.  Previous smoker with COPD and coronary disease.  Quit smoking long time ago.  States compliance aspirin, lipid medications and other medications.  [facility]  [facility].  Currently well controlled.  Seeing outside Treatment Facility regarding opioid dependence on methadone.      Past Medical History:  Past Medical History:   Diagnosis Date    Alcoholism in remission     Anxiety     CAD (coronary artery disease) 9/2/2016    Chronic back pain     pain mgt    Chronic hepatitis C without hepatic coma 1/8/2016    Colon polyps     COPD (chronic obstructive pulmonary disease)     Depressed left ventricular systolic function 9/6/2016    Depression     Diverticulosis     DJD (degenerative joint disease) of hip     Emphysema of lung     Esophageal ulcer with bleeding     GERD (gastroesophageal reflux disease)     Hepatitis C     completed Interferon therapy    Hiatal hernia     Hyperlipidemia with target LDL less than 100 1/7/2015    Hypertension     Hypertriglyceridemia     Opioid dependence in controlled environment 4/16/2018    Osteomyelitis of right foot     Peripheral arterial disease     PTSD (post-traumatic stress disorder)     Spells     last Sept 2013, not sure if it was TIA v seizure    Squamous cell skin cancer     Testosterone deficiency      Tubular adenoma of colon      Past Surgical History:   Procedure Laterality Date    CARDIAC CATHETERIZATION      COLONOSCOPY      COLONOSCOPY N/A 2016    Performed by Amauri Delatorre [facility] at [facility]    CORONARY ARTERY BYPASS GRAFT  2016    DEBRIDEMENT-FOOT Right 3/21/2014    [facility]    EXCISION, BONE SPUR, FOOT Right 2013    Per[facility] at [facility] OR    FOOT SURGERY Right 2013    fifth metatarsal head resection [Other]    ORIF WRIST FRACTURE      LT    REVISION ENTHESIS Right 2013    Performed by P[facility]o [facility]iegos DPM at [facility] OR    SHOULDER SURGERY      left    TONSILLECTOMY      UPPER GASTROINTESTINAL ENDOSCOPY  2016,      Social History     Socioeconomic History    Marital status:      Spouse name: Not on file    Number of children: Not on file    Years of education: Not on file    Highest education level: Not on file   Social Needs    Financial resource strain: Not on file    Food insecurity - worry: Not on file    Food insecurity - inability: Not on file    Transportation needs - medical: Not on file    Transportation needs - non-medical: Not on file   Occupational History    Not on file   Tobacco Use    Smoking status: Former Smoker     Packs/day: 1.50     Years: 40.00     Pack years: 60.00     Types: Cigarettes, Cigars     Last attempt to quit: 2016     Years since quittin.3    Smokeless tobacco: Former User     Quit date: 2016   Substance and Sexual Activity    Alcohol use: No     Alcohol/week: 0.0 oz     Comment: quit 30 y- prior alcoholism    Drug use: Yes     Comment: methadone     Sexual activity: Not on file   Other Topics Concern    Not on file   Social History Narrative    Not on file     Family History   Problem Relation Age of Onset    Lung cancer Mother     Hypertension Mother     Hypertension Father     Arrhythmia Brother 60    Heart disease  Daughter     Heart attacks under age 50 Daughter     Colon cancer Neg Hx     Stomach cancer Neg Hx      Review of patient's allergies indicates:   Allergen Reactions    No known drug allergies      Current Outpatient Medications on File Prior to Visit   Medication Sig Dispense Refill    albuterol (PROVENTIL/VENTOLIN HFA) 90 mcg/actuation inhaler Inhale 2 puffs into the lungs every 6 (six) hours as needed for Wheezing or Shortness of Breath. 18 g 5    aspirin (ECOTRIN) 81 MG EC tablet Take 81 mg by mouth once daily.      atorvastatin (LIPITOR) 20 MG tablet Take 1 tablet (20 mg total) by mouth once daily. (Patient taking differently: Take 10 mg by mouth once daily. ) 90 tablet 2    benazepril (LOTENSIN) 20 MG tablet Take 20 mg by mouth every evening.      budesonide-formoterol 80-4.5 mcg (SYMBICORT) 80-4.5 mcg/actuation HFAA Inhale 2 puffs into the lungs 2 (two) times daily. Controller       buPROPion (WELLBUTRIN XL) 300 MG 24 hr tablet Take 300 mg by mouth once daily.      carvedilol (COREG) 12.5 MG tablet Take 1 tablet (12.5 mg total) by mouth 2 (two) times daily with meals. 180 tablet 2    clonazePAM (KLONOPIN) 1 MG tablet Take 1 tablet (1 mg total) by mouth 2 (two) times daily. (Patient taking differently: Take 1 mg by mouth as needed. ) 60 tablet 1    coenzyme Q10 (CO Q-10) 100 mg capsule Take 100 mg by mouth once daily.      hydroCHLOROthiazide (HYDRODIURIL) 25 MG tablet Take 0.5 tablets (12.5 mg total) by mouth once daily.      LACTOBAC NO.41/BIFIDOBACT NO.7 (PROBIOTIC-10 ORAL) Take 10 mg by mouth once daily.      methadone (DOLOPHINE) 10 mg/5 mL solution Take 45 mg by mouth.      multivitamin with minerals tablet Take 1 tablet by mouth.      nebulizer accessories Kit by Miscellaneous route. Use as directed      tiotropium (SPIRIVA) 18 mcg inhalation capsule Inhale 18 mcg into the lungs.      zolpidem (AMBIEN) 10 mg Tab Take 10 mg by mouth nightly as needed.      omeprazole (PRILOSEC) 40 MG  capsule Take 1 capsule (40 mg total) by mouth once daily. 90 capsule 1    [DISCONTINUED] krill-om3-dha-epa-[id number]-lip-astx 1,500-165-67.5 mg Cap Take 1 capsule by mouth.       No current facility-administered medications on file prior to visit.          ROS:  GENERAL: No fever, chills,  or significant weight changes.   CARDIOVASCULAR: Denies chest pain, PND, orthopnea.      OBJECTIVE:     Vitals:    01/08/19 1432   BP: 100/67   Pulse: 67   Temp: 98 °F (36.7 °C)   TempSrc: Oral   Weight: 76.8 kg (169 lb 6.4 oz)   Height: 6' (1.829 m)     Wt Readings from Last 3 Encounters:   01/08/19 76.8 kg (169 lb 6.4 oz)   12/07/18 78.8 kg (173 lb 12.8 oz)   10/02/18 79 kg (174 lb 2.6 oz)     APPEARANCE: Well nourished, well developed, in no acute distress.    HEAD: Normocephalic.  Atraumatic.  No sinus tenderness.  EYES:   Right eye: Pupil reactive.  Conjunctiva clear.    Left eye: Pupil reactive.  Conjunctiva clear.    NECK: Supple.  CHEST: Breath sounds clear bilaterally.  Normal respiratory effort  CARDIOVASCULAR: Normal rate.  Regular rhythm.  No murmurs.  No rub.  No gallops.  ABDOMEN: Bowel sounds normal.  Soft.  No tenderness.  No organomegaly.  PERIPHERAL VASCULAR: No cyanosis.  No clubbing.  No edema. Unable to palpate dorsalis pedis or posterior tibial pulse on the left.  Palpable dorsalis pedis on the right.  He has some decreased hair distribution consistent with peripheral arterial disease both lower legs.  NEUROLOGIC: No focal findings.  MENTAL STATUS: Alert.  Oriented x 3.  Back nontender      Ilana was seen today for leg pain.    Diagnoses and all orders for this visit:    Peripheral arterial disease    Claudication of left lower extremity    Moderate COPD (chronic obstructive pulmonary disease)    Centrilobular emphysema    Alcoholism in remission    Major depressive disorder in remission, unspecified whether recurrent    Opioid dependence in controlled environment    PTSD (post-traumatic stress  disorder)    Anxiety     At this point having mild symptoms.  Recent GAYATHRI normal.  Recommend regular exercise program with walking to see if we can help with collateral circulation.  Follow-up if symptoms increasing or if he develops any sores.  Continue abstinence from smoking.  Continue other medications to include aspirin lipid medication and blood pressure medications.  [facility] issues.

## 2019-01-30 ENCOUNTER — HOSPITAL ENCOUNTER (OUTPATIENT)
Dept: RADIOLOGY | Facility: HOSPITAL | Age: 69
Discharge: HOME | End: 2019-01-30
Attending: INTERNAL MEDICINE
Payer: MEDICARE

## 2019-01-30 ENCOUNTER — OFFICE VISIT (OUTPATIENT)
Dept: CARDIOLOGY | Facility: CLINIC | Age: 69
End: 2019-01-30
Payer: MEDICARE

## 2019-01-30 VITALS
DIASTOLIC BLOOD PRESSURE: 76 MMHG | BODY MASS INDEX: 23.03 KG/M2 | SYSTOLIC BLOOD PRESSURE: 157 MMHG | HEART RATE: 61 BPM | WEIGHT: 173.75 LBS | HEIGHT: 73 IN

## 2019-01-30 DIAGNOSIS — M79.672 LEFT FOOT PAIN: ICD-10-CM

## 2019-01-30 DIAGNOSIS — I25.10 CORONARY ARTERY DISEASE INVOLVING NATIVE CORONARY ARTERY OF NATIVE HEART WITHOUT ANGINA PECTORIS: ICD-10-CM

## 2019-01-30 DIAGNOSIS — E78.5 HYPERLIPIDEMIA WITH TARGET LDL LESS THAN 100: ICD-10-CM

## 2019-01-30 DIAGNOSIS — I73.9 PAD (PERIPHERAL ARTERY DISEASE): ICD-10-CM

## 2019-01-30 DIAGNOSIS — M79.672 FOOT PAIN, LEFT: ICD-10-CM

## 2019-01-30 DIAGNOSIS — Z87.891 EX-SMOKER: ICD-10-CM

## 2019-01-30 DIAGNOSIS — F10.21 ALCOHOLISM IN REMISSION: Primary | ICD-10-CM

## 2019-01-30 DIAGNOSIS — I73.9 PERIPHERAL ARTERIAL DISEASE: ICD-10-CM

## 2019-01-30 DIAGNOSIS — I10 ESSENTIAL HYPERTENSION: ICD-10-CM

## 2019-01-30 DIAGNOSIS — E78.1 HYPERTRIGLYCERIDEMIA: ICD-10-CM

## 2019-01-30 DIAGNOSIS — M79.605 LEFT LEG PAIN: ICD-10-CM

## 2019-01-30 DIAGNOSIS — Z95.1 HX OF CABG: ICD-10-CM

## 2019-01-30 PROCEDURE — 99999 PR PBB SHADOW E&M-EST. PATIENT-LVL III: CPT | Mod: PBBFAC,,, | Performed by: INTERNAL MEDICINE

## 2019-01-30 PROCEDURE — 93971 US LOWER EXTREMITY VEINS LEFT: ICD-10-PCS | Mod: 26,LT,, | Performed by: RADIOLOGY

## 2019-01-30 PROCEDURE — 99213 OFFICE O/P EST LOW 20 MIN: CPT | Mod: PBBFAC,25,PO | Performed by: INTERNAL MEDICINE

## 2019-01-30 PROCEDURE — 99214 OFFICE O/P EST MOD 30 MIN: CPT | Mod: S$PBB,,, | Performed by: INTERNAL MEDICINE

## 2019-01-30 PROCEDURE — 99999 PR PBB SHADOW E&M-EST. PATIENT-LVL III: ICD-10-PCS | Mod: PBBFAC,,, | Performed by: INTERNAL MEDICINE

## 2019-01-30 PROCEDURE — 73630 X-RAY EXAM OF FOOT: CPT | Mod: 26,LT,, | Performed by: RADIOLOGY

## 2019-01-30 PROCEDURE — 73630 X-RAY EXAM OF FOOT: CPT | Mod: TC,FY,PO,LT

## 2019-01-30 PROCEDURE — 93922 UPR/L XTREMITY ART 2 LEVELS: CPT | Mod: 26,,, | Performed by: RADIOLOGY

## 2019-01-30 PROCEDURE — 93971 EXTREMITY STUDY: CPT | Mod: TC,PO,LT

## 2019-01-30 PROCEDURE — 93922 UPR/L XTREMITY ART 2 LEVELS: CPT | Mod: TC,PO

## 2019-01-30 PROCEDURE — 93922 US ARTERIAL LOWER EXTREMITY BILAT WITH ABI (XPD): ICD-10-PCS | Mod: 26,,, | Performed by: RADIOLOGY

## 2019-01-30 PROCEDURE — 73630 XR FOOT COMPLETE 3 VIEW LEFT: ICD-10-PCS | Mod: 26,LT,, | Performed by: RADIOLOGY

## 2019-01-30 PROCEDURE — 93971 EXTREMITY STUDY: CPT | Mod: 26,LT,, | Performed by: RADIOLOGY

## 2019-01-30 PROCEDURE — 99214 PR OFFICE/OUTPT VISIT, EST, LEVL IV, 30-39 MIN: ICD-10-PCS | Mod: S$PBB,,, | Performed by: INTERNAL MEDICINE

## 2019-01-30 PROCEDURE — 93925 US ARTERIAL LOWER EXTREMITY BILAT WITH ABI (XPD): ICD-10-PCS | Mod: 26,,, | Performed by: RADIOLOGY

## 2019-01-30 PROCEDURE — 93925 LOWER EXTREMITY STUDY: CPT | Mod: 26,,, | Performed by: RADIOLOGY

## 2019-01-30 NOTE — PROGRESS NOTES
Subjective:   Patient ID:  Ilana Carr is a 68 y.o. male who presents for follow-up of pain in leg  Pt with recent L leg pain.Patient denies CP, angina or anginal equivalent.    Hypertension   This is a chronic problem. The current episode started more than 1 year ago. The problem has been gradually improving since onset. The problem is controlled. Pertinent negatives include no chest pain, palpitations or shortness of breath. Past treatments include beta blockers, ACE inhibitors and diuretics. The current treatment provides moderate improvement. There are no compliance problems.    Hyperlipidemia   This is a chronic problem. The current episode started more than 1 year ago. The problem is controlled. Recent lipid tests were reviewed and are variable. Pertinent negatives include no chest pain or shortness of breath. Current antihyperlipidemic treatment includes statins. The current treatment provides moderate improvement of lipids. There are no compliance problems.    Coronary Artery Disease   Presents for follow-up visit. Pertinent negatives include no chest pain, chest pressure, chest tightness, dizziness, leg swelling, muscle weakness, palpitations, shortness of breath or weight gain. Risk factors include hyperlipidemia. The symptoms have been stable. Compliance with diet is variable. Compliance with exercise is variable. Compliance with medications is good.       Review of Systems   Constitution: Negative. Negative for weight gain.   HENT: Negative.    Eyes: Negative.    Cardiovascular: Negative.  Negative for chest pain, leg swelling and palpitations.   Respiratory: Negative.  Negative for chest tightness and shortness of breath.    Endocrine: Negative.    Hematologic/Lymphatic: Negative.    Skin: Negative.    Musculoskeletal: Negative for muscle weakness.   Gastrointestinal: Negative.    Genitourinary: Negative.    Neurological: Negative.  Negative for dizziness.   Psychiatric/Behavioral: Negative.     Allergic/Immunologic: Negative.      Family History   Problem Relation Age of Onset    Lung cancer Mother     Hypertension Mother     Hypertension Father     Arrhythmia Brother 60    Heart disease Daughter     Heart attacks under age 50 Daughter     Colon cancer Neg Hx     Stomach cancer Neg Hx      Past Medical History:   Diagnosis Date    Alcoholism in remission     Anxiety     CAD (coronary artery disease) 9/2/2016    Chronic back pain     pain mgt    Chronic hepatitis C without hepatic coma 1/8/2016    Colon polyps     COPD (chronic obstructive pulmonary disease)     Depressed left ventricular systolic function 9/6/2016    Depression     Diverticulosis     DJD (degenerative joint disease) of hip     Emphysema of lung     Esophageal ulcer with bleeding     GERD (gastroesophageal reflux disease)     Hepatitis C     completed Interferon therapy    Hiatal hernia     Hyperlipidemia with target LDL less than 100 1/7/2015    Hypertension     Hypertriglyceridemia     Opioid dependence in controlled environment 4/16/2018    Osteomyelitis of right foot     Peripheral arterial disease     PTSD (post-traumatic stress disorder)     Spells     last Sept 2013, not sure if it was TIA v seizure    Squamous cell skin cancer     Testosterone deficiency     Tubular adenoma of colon      Current Outpatient Medications on File Prior to Visit   Medication Sig Dispense Refill    albuterol (PROVENTIL/VENTOLIN HFA) 90 mcg/actuation inhaler Inhale 2 puffs into the lungs every 6 (six) hours as needed for Wheezing or Shortness of Breath. 18 g 5    aspirin (ECOTRIN) 81 MG EC tablet Take 81 mg by mouth once daily.      atorvastatin (LIPITOR) 20 MG tablet Take 1 tablet (20 mg total) by mouth once daily. (Patient taking differently: Take 10 mg by mouth once daily. ) 90 tablet 2    benazepril (LOTENSIN) 20 MG tablet Take 20 mg by mouth every evening.      budesonide-formoterol 80-4.5 mcg (SYMBICORT)  80-4.5 mcg/actuation HFAA Inhale 2 puffs into the lungs 2 (two) times daily. Controller       buPROPion (WELLBUTRIN XL) 300 MG 24 hr tablet Take 300 mg by mouth once daily.      carvedilol (COREG) 12.5 MG tablet Take 1 tablet (12.5 mg total) by mouth 2 (two) times daily with meals. 180 tablet 2    clonazePAM (KLONOPIN) 1 MG tablet Take 1 tablet (1 mg total) by mouth 2 (two) times daily. (Patient taking differently: Take 1 mg by mouth as needed. ) 60 tablet 1    coenzyme Q10 (CO Q-10) 100 mg capsule Take 100 mg by mouth once daily.      hydroCHLOROthiazide (HYDRODIURIL) 25 MG tablet Take 0.5 tablets (12.5 mg total) by mouth once daily.      LACTOBAC NO.41/BIFIDOBACT NO.7 (PROBIOTIC-10 ORAL) Take 10 mg by mouth once daily.      methadone (DOLOPHINE) 10 mg/5 mL solution Take 45 mg by mouth.      multivitamin with minerals tablet Take 1 tablet by mouth.      nebulizer accessories Kit by Miscellaneous route. Use as directed      omeprazole (PRILOSEC) 40 MG capsule Take 1 capsule (40 mg total) by mouth once daily. 90 capsule 1    tiotropium (SPIRIVA) 18 mcg inhalation capsule Inhale 18 mcg into the lungs.      zolpidem (AMBIEN) 10 mg Tab Take 10 mg by mouth nightly as needed.       No current facility-administered medications on file prior to visit.      Review of patient's allergies indicates:   Allergen Reactions    No known drug allergies        Objective:     Physical Exam   Constitutional: He is oriented to person, place, and time. He appears well-developed and well-nourished.   HENT:   Head: Normocephalic and atraumatic.   Eyes: Conjunctivae are normal. Pupils are equal, round, and reactive to light.   Neck: Normal range of motion. Neck supple.   Cardiovascular: Normal rate, regular rhythm, normal heart sounds and intact distal pulses.   Pulses:       Popliteal pulses are 2+ on the right side, and 1+ on the left side.        Dorsalis pedis pulses are 1+ on the right side, and 1+ on the left side.         Posterior tibial pulses are 1+ on the right side, and 1+ on the left side.   Pulmonary/Chest: Effort normal and breath sounds normal.   Abdominal: Soft. Bowel sounds are normal.   Neurological: He is alert and oriented to person, place, and time.   Skin: Skin is warm and dry.   Psychiatric: He has a normal mood and affect.   Nursing note and vitals reviewed.      Assessment:     1. Alcoholism in remission    2. Coronary artery disease involving native coronary artery of native heart without angina pectoris    3. Hypertriglyceridemia    4. Essential hypertension    5. Peripheral arterial disease    6. Hyperlipidemia with target LDL less than 100    7. Hx of CABG    8. Ex-smoker        Plan:     Alcoholism in remission    Coronary artery disease involving native coronary artery of native heart without angina pectoris    Hypertriglyceridemia    Essential hypertension    Peripheral arterial disease    Hyperlipidemia with target LDL less than 100    Hx of CABG    Ex-smoker      Venous us L leg  Arterial us L leg  X ray L foot  Continue asa- cad  Continue statin-hlp  Continue coreg, benazapril, hctz-htn

## 2019-01-31 ENCOUNTER — TELEPHONE (OUTPATIENT)
Dept: CARDIOLOGY | Facility: CLINIC | Age: 69
End: 2019-01-31

## 2019-01-31 NOTE — TELEPHONE ENCOUNTER
01/31 I called and notified him of the results. Reviewed the procedure. He agreed to do. Told him I will call back once scheduled to go over instructions. He agreed and req I call him early Friday due to other Dr troy that afternoon. I agreed. Cm    ----- Message from Gil House MD sent at 1/30/2019  9:40 PM CST -----  Xray of foot normal, venous us nml, has PAD, schedule AA and runoff next Wednesday 12 noon

## 2019-02-01 ENCOUNTER — TELEPHONE (OUTPATIENT)
Dept: CARDIOLOGY | Facility: CLINIC | Age: 69
End: 2019-02-01

## 2019-02-01 ENCOUNTER — LAB VISIT (OUTPATIENT)
Dept: LAB | Facility: HOSPITAL | Age: 69
End: 2019-02-01
Attending: INTERNAL MEDICINE
Payer: MEDICARE

## 2019-02-01 DIAGNOSIS — I73.9 PERIPHERAL ARTERIAL DISEASE: ICD-10-CM

## 2019-02-01 DIAGNOSIS — I10 HYPERTENSION, UNSPECIFIED TYPE: ICD-10-CM

## 2019-02-01 DIAGNOSIS — I25.10 CORONARY ARTERY DISEASE INVOLVING NATIVE CORONARY ARTERY OF NATIVE HEART WITHOUT ANGINA PECTORIS: Primary | ICD-10-CM

## 2019-02-01 DIAGNOSIS — I25.10 CORONARY ARTERY DISEASE INVOLVING NATIVE CORONARY ARTERY OF NATIVE HEART WITHOUT ANGINA PECTORIS: ICD-10-CM

## 2019-02-01 DIAGNOSIS — Z79.01 LONG TERM CURRENT USE OF ANTICOAGULANT: ICD-10-CM

## 2019-02-01 LAB
ANION GAP SERPL CALC-SCNC: 9 MMOL/L
APTT BLDCRRT: 28.4 SEC
BUN SERPL-MCNC: 19 MG/DL
CALCIUM SERPL-MCNC: 9.7 MG/DL
CHLORIDE SERPL-SCNC: 100 MMOL/L
CO2 SERPL-SCNC: 28 MMOL/L
CREAT SERPL-MCNC: 0.9 MG/DL
ERYTHROCYTE [DISTWIDTH] IN BLOOD BY AUTOMATED COUNT: 14.1 %
EST. GFR  (AFRICAN AMERICAN): >60 ML/MIN/1.73 M^2
EST. GFR  (NON AFRICAN AMERICAN): >60 ML/MIN/1.73 M^2
GLUCOSE SERPL-MCNC: 101 MG/DL
HCT VFR BLD AUTO: 38.8 %
HGB BLD-MCNC: 12.4 G/DL
INR PPP: 1.1
MCH RBC QN AUTO: 30 PG
MCHC RBC AUTO-ENTMCNC: 32 G/DL
MCV RBC AUTO: 94 FL
PLATELET # BLD AUTO: 222 K/UL
PMV BLD AUTO: 10.2 FL
POTASSIUM SERPL-SCNC: 4.8 MMOL/L
PROTHROMBIN TIME: 10.9 SEC
RBC # BLD AUTO: 4.14 M/UL
SODIUM SERPL-SCNC: 137 MMOL/L
WBC # BLD AUTO: 4.82 K/UL

## 2019-02-01 PROCEDURE — 80048 BASIC METABOLIC PNL TOTAL CA: CPT

## 2019-02-01 PROCEDURE — 36415 COLL VENOUS BLD VENIPUNCTURE: CPT | Mod: PO

## 2019-02-01 PROCEDURE — 85610 PROTHROMBIN TIME: CPT

## 2019-02-01 PROCEDURE — 85027 COMPLETE CBC AUTOMATED: CPT

## 2019-02-01 PROCEDURE — 85730 THROMBOPLASTIN TIME PARTIAL: CPT

## 2019-02-01 NOTE — TELEPHONE ENCOUNTER
I called pt and rev'd date, time and location of he procedure on 02/06/19 at Noon by Dr Gil House. Rev'd pre procedure instructions. Informed him since he can not come to the office to sign consent, he will need to sign at the hospital. Gave him date, time and location of follow up appts. He agreed to all. Faxed over info to [facility] at [phone]. Cm

## 2019-02-05 ENCOUNTER — TELEPHONE (OUTPATIENT)
Dept: CARDIOLOGY | Facility: CLINIC | Age: 69
End: 2019-02-05

## 2019-02-05 NOTE — TELEPHONE ENCOUNTER
Attempted to reach patient to remind him of his procedure tomorrow.I left message for patient and  asked patient to contact office if he had an questions.

## 2019-02-06 ENCOUNTER — HOSPITAL ENCOUNTER (OUTPATIENT)
Facility: HOSPITAL | Age: 69
Discharge: HOME | End: 2019-02-06
Attending: INTERNAL MEDICINE | Admitting: INTERNAL MEDICINE
Payer: MEDICARE

## 2019-02-06 VITALS
RESPIRATION RATE: 14 BRPM | HEIGHT: 73 IN | WEIGHT: 173 LBS | SYSTOLIC BLOOD PRESSURE: 117 MMHG | OXYGEN SATURATION: 95 % | DIASTOLIC BLOOD PRESSURE: 65 MMHG | HEART RATE: 56 BPM | BODY MASS INDEX: 22.93 KG/M2 | TEMPERATURE: 98 F

## 2019-02-06 DIAGNOSIS — I70.219 ATHEROSCLEROTIC PVD WITH INTERMITTENT CLAUDICATION: ICD-10-CM

## 2019-02-06 DIAGNOSIS — R94.30 ABNORMAL RESULT OF CARDIOVASCULAR FUNCTION STUDY: ICD-10-CM

## 2019-02-06 DIAGNOSIS — I73.9 PERIPHERAL ARTERIAL DISEASE: Primary | ICD-10-CM

## 2019-02-06 LAB — PERIPHERAL STENOSIS: ABNORMAL

## 2019-02-06 PROCEDURE — 75710 ARTERY X-RAYS ARM/LEG: CPT | Mod: 26,LT,, | Performed by: INTERNAL MEDICINE

## 2019-02-06 PROCEDURE — 75625 CONTRAST EXAM ABDOMINL AORTA: CPT | Mod: 26,,, | Performed by: INTERNAL MEDICINE

## 2019-02-06 PROCEDURE — 36200 CATH LAB PROCEDURE: ICD-10-PCS | Mod: ,,, | Performed by: INTERNAL MEDICINE

## 2019-02-06 PROCEDURE — 36200 PLACE CATHETER IN AORTA: CPT | Mod: ,,, | Performed by: INTERNAL MEDICINE

## 2019-02-06 PROCEDURE — 25000003 PHARM REV CODE 250

## 2019-02-06 PROCEDURE — 75625 CATH LAB PROCEDURE: ICD-10-PCS | Mod: 26,,, | Performed by: INTERNAL MEDICINE

## 2019-02-06 PROCEDURE — 75710 CATH LAB PROCEDURE: ICD-10-PCS | Mod: 26,LT,, | Performed by: INTERNAL MEDICINE

## 2019-02-06 PROCEDURE — 99152 MOD SED SAME PHYS/QHP 5/>YRS: CPT

## 2019-02-06 PROCEDURE — C1894 INTRO/SHEATH, NON-LASER: HCPCS

## 2019-02-06 PROCEDURE — 25500020 PHARM REV CODE 255

## 2019-02-06 PROCEDURE — 63600175 PHARM REV CODE 636 W HCPCS

## 2019-02-06 PROCEDURE — 99152 MOD SED SAME PHYS/QHP 5/>YRS: CPT | Mod: ,,, | Performed by: INTERNAL MEDICINE

## 2019-02-06 PROCEDURE — C1769 GUIDE WIRE: HCPCS

## 2019-02-06 PROCEDURE — 99152 CATH LAB PROCEDURE: ICD-10-PCS | Mod: ,,, | Performed by: INTERNAL MEDICINE

## 2019-02-06 RX ORDER — ATROPINE SULFATE 0.1 MG/ML
0.5 INJECTION INTRAVENOUS
Status: DISCONTINUED | OUTPATIENT
Start: 2019-02-06 | End: 2019-02-06 | Stop reason: HOSPADM

## 2019-02-06 RX ORDER — DIAZEPAM 5 MG/1
5 TABLET ORAL ONCE
Status: COMPLETED | OUTPATIENT
Start: 2019-02-06 | End: 2019-02-06

## 2019-02-06 RX ORDER — NITROGLYCERIN 0.4 MG/1
0.4 TABLET SUBLINGUAL EVERY 5 MIN PRN
Status: DISCONTINUED | OUTPATIENT
Start: 2019-02-06 | End: 2019-02-06 | Stop reason: HOSPADM

## 2019-02-06 RX ORDER — SODIUM CHLORIDE 9 MG/ML
INJECTION, SOLUTION INTRAVENOUS CONTINUOUS
Status: ACTIVE | OUTPATIENT
Start: 2019-02-06 | End: 2019-02-06

## 2019-02-06 RX ORDER — OXYCODONE HYDROCHLORIDE 5 MG/1
10 TABLET ORAL EVERY 4 HOURS PRN
Status: DISCONTINUED | OUTPATIENT
Start: 2019-02-06 | End: 2019-02-06 | Stop reason: HOSPADM

## 2019-02-06 RX ORDER — HYDROCODONE BITARTRATE AND ACETAMINOPHEN 5; 325 MG/1; MG/1
1 TABLET ORAL EVERY 4 HOURS PRN
Status: DISCONTINUED | OUTPATIENT
Start: 2019-02-06 | End: 2019-02-06 | Stop reason: HOSPADM

## 2019-02-06 RX ORDER — SODIUM CHLORIDE 9 MG/ML
INJECTION, SOLUTION INTRAVENOUS CONTINUOUS
Status: DISCONTINUED | OUTPATIENT
Start: 2019-02-06 | End: 2019-02-06 | Stop reason: HOSPADM

## 2019-02-06 RX ORDER — ACETAMINOPHEN 325 MG/1
650 TABLET ORAL EVERY 4 HOURS PRN
Status: DISCONTINUED | OUTPATIENT
Start: 2019-02-06 | End: 2019-02-06 | Stop reason: HOSPADM

## 2019-02-06 RX ORDER — DIPHENHYDRAMINE HCL 50 MG
50 CAPSULE ORAL ONCE
Status: COMPLETED | OUTPATIENT
Start: 2019-02-06 | End: 2019-02-06

## 2019-02-06 RX ADMIN — SODIUM CHLORIDE: 9 INJECTION, SOLUTION INTRAVENOUS at 10:02

## 2019-02-06 RX ADMIN — DIAZEPAM 5 MG: 5 TABLET ORAL at 10:02

## 2019-02-06 RX ADMIN — Medication 50 MG: at 10:02

## 2019-02-06 NOTE — PLAN OF CARE
Pt ambulated to bathroom, IV discontinued and tip intact, site dressed with gauze and coban no bleeding or hematoma, op site dressing clean dry and intact, no swelling,  discharged via wheelchair

## 2019-02-06 NOTE — BRIEF OP NOTE
<[facility]  Surgery Department  Operative Note    SUMMARY     Date of Procedure: 2/6/2019     Procedure: Procedure(s) (LRB):  AORTOGRAM, WITH RUNOFF (N/A)     Surgeon(s) and Role:     * Gil House MD - Primary    Assisting Surgeon: None    Pre-Operative Diagnosis: PVD (peripheral vascular disease) with claudication [I73.9]  Abnormal ultrasound of lower extremity [R93.6]    Post-Operative Diagnosis: Post-Op Diagnosis Codes:     * PVD (peripheral vascular disease) with claudication [I73.9]     * Abnormal ultrasound of lower extremity [R93.6]    Anesthesia: RN IV Sedation    Technical Procedures Used: AA AND RUNOFF    Description of the Findings of the Procedure: AA AND RUNOFF, DX- PAD, FINDINGS:OCCLUDED L SFA WITH RECONSTITUTION AT L POPLITEAL, RECOMMEND LLE BYPASS    Significant Surgical Tasks Conducted by the Assistant(s), if Applicable: NONE    Complications: No    Estimated Blood Loss (EBL): < 50 cc           Implants: * No implants in log *    Specimens:   Specimen (12h ago, onward)    None                  Condition: Good    Disposition: PACU - hemodynamically stable.    Attestation: I was present and scrubbed for the entire procedure.

## 2019-02-06 NOTE — H&P
Subjective:   Patient ID:  Ilana Carr is a 68 y.o. male who presents for follow-up of pain in leg  Pt with recent L leg pain.Patient denies CP, angina or anginal equivalent.     Hypertension   This is a chronic problem. The current episode started more than 1 year ago. The problem has been gradually improving since onset. The problem is controlled. Pertinent negatives include no chest pain, palpitations or shortness of breath. Past treatments include beta blockers, ACE inhibitors and diuretics. The current treatment provides moderate improvement. There are no compliance problems.    Hyperlipidemia   This is a chronic problem. The current episode started more than 1 year ago. The problem is controlled. Recent lipid tests were reviewed and are variable. Pertinent negatives include no chest pain or shortness of breath. Current antihyperlipidemic treatment includes statins. The current treatment provides moderate improvement of lipids. There are no compliance problems.    Coronary Artery Disease   Presents for follow-up visit. Pertinent negatives include no chest pain, chest pressure, chest tightness, dizziness, leg swelling, muscle weakness, palpitations, shortness of breath or weight gain. Risk factors include hyperlipidemia. The symptoms have been stable. Compliance with diet is variable. Compliance with exercise is variable. Compliance with medications is good.         Review of Systems   Constitution: Negative. Negative for weight gain.   HENT: Negative.    Eyes: Negative.    Cardiovascular: Negative.  Negative for chest pain, leg swelling and palpitations.   Respiratory: Negative.  Negative for chest tightness and shortness of breath.    Endocrine: Negative.    Hematologic/Lymphatic: Negative.    Skin: Negative.    Musculoskeletal: Negative for muscle weakness.   Gastrointestinal: Negative.    Genitourinary: Negative.    Neurological: Negative.  Negative for dizziness.   Psychiatric/Behavioral: Negative.     Allergic/Immunologic: Negative.             Family History   Problem Relation Age of Onset    Lung cancer Mother      Hypertension Mother      Hypertension Father      Arrhythmia Brother 60    Heart disease Daughter      Heart attacks under age 50 Daughter      Colon cancer Neg Hx      Stomach cancer Neg Hx             Past Medical and Surgical History:   Diagnosis Date    Alcoholism in remission      Anxiety      CAD (coronary artery disease) 9/2/2016    Chronic back pain       pain mgt    Chronic hepatitis C without hepatic coma 1/8/2016    Colon polyps      COPD (chronic obstructive pulmonary disease)      Depressed left ventricular systolic function 9/6/2016    Depression      Diverticulosis      DJD (degenerative joint disease) of hip      Emphysema of lung      Esophageal ulcer with bleeding      GERD (gastroesophageal reflux disease)      Hepatitis C       completed Interferon therapy    Hiatal hernia      Hyperlipidemia with target LDL less than 100 1/7/2015    Hypertension      Hypertriglyceridemia      Opioid dependence in controlled environment 4/16/2018    Osteomyelitis of right foot      Peripheral arterial disease      PTSD (post-traumatic stress disorder)      Spells       last Sept 2013, not sure if it was TIA v seizure    Squamous cell skin cancer      Testosterone deficiency      Tubular adenoma of colon               Current Outpatient Medications on File Prior to Visit   Medication Sig Dispense Refill    albuterol (PROVENTIL/VENTOLIN HFA) 90 mcg/actuation inhaler Inhale 2 puffs into the lungs every 6 (six) hours as needed for Wheezing or Shortness of Breath. 18 g 5    aspirin (ECOTRIN) 81 MG EC tablet Take 81 mg by mouth once daily.        atorvastatin (LIPITOR) 20 MG tablet Take 1 tablet (20 mg total) by mouth once daily. (Patient taking differently: Take 10 mg by mouth once daily. ) 90 tablet 2    benazepril (LOTENSIN) 20 MG tablet Take 20 mg by mouth  every evening.        budesonide-formoterol 80-4.5 mcg (SYMBICORT) 80-4.5 mcg/actuation HFAA Inhale 2 puffs into the lungs 2 (two) times daily. Controller         buPROPion (WELLBUTRIN XL) 300 MG 24 hr tablet Take 300 mg by mouth once daily.        carvedilol (COREG) 12.5 MG tablet Take 1 tablet (12.5 mg total) by mouth 2 (two) times daily with meals. 180 tablet 2    clonazePAM (KLONOPIN) 1 MG tablet Take 1 tablet (1 mg total) by mouth 2 (two) times daily. (Patient taking differently: Take 1 mg by mouth as needed. ) 60 tablet 1    coenzyme Q10 (CO Q-10) 100 mg capsule Take 100 mg by mouth once daily.        hydroCHLOROthiazide (HYDRODIURIL) 25 MG tablet Take 0.5 tablets (12.5 mg total) by mouth once daily.        LACTOBAC NO.41/BIFIDOBACT NO.7 (PROBIOTIC-10 ORAL) Take 10 mg by mouth once daily.        methadone (DOLOPHINE) 10 mg/5 mL solution Take 45 mg by mouth.        multivitamin with minerals tablet Take 1 tablet by mouth.        nebulizer accessories Kit by Miscellaneous route. Use as directed        omeprazole (PRILOSEC) 40 MG capsule Take 1 capsule (40 mg total) by mouth once daily. 90 capsule 1    tiotropium (SPIRIVA) 18 mcg inhalation capsule Inhale 18 mcg into the lungs.        zolpidem (AMBIEN) 10 mg Tab Take 10 mg by mouth nightly as needed.          No current facility-administered medications on file prior to visit.            Review of patient's allergies indicates:   Allergen Reactions    No known drug allergies           Objective:      Physical Exam   Constitutional: He is oriented to person, place, and time. He appears well-developed and well-nourished.   HENT:   Head: Normocephalic and atraumatic.   Eyes: Conjunctivae are normal. Pupils are equal, round, and reactive to light.   Neck: Normal range of motion. Neck supple.   Cardiovascular: Normal rate, regular rhythm, normal heart sounds and intact distal pulses.   Pulses:       Popliteal pulses are 2+ on the right side, and 1+ on  the left side.        Dorsalis pedis pulses are 1+ on the right side, and 1+ on the left side.        Posterior tibial pulses are 1+ on the right side, and 1+ on the left side.   Pulmonary/Chest: Effort normal and breath sounds normal.   Abdominal: Soft. Bowel sounds are normal.   Neurological: He is alert and oriented to person, place, and time.   Skin: Skin is warm and dry.   Psychiatric: He has a normal mood and affect.   Nursing note and vitals reviewed.        Assessment:      1. Alcoholism in remission    2. Coronary artery disease involving native coronary artery of native heart without angina pectoris    3. Hypertriglyceridemia    4. Essential hypertension    5. Peripheral arterial disease    6. Hyperlipidemia with target LDL less than 100    7. Hx of CABG    8. Ex-smoker          Plan:      Alcoholism in remission     Coronary artery disease involving native coronary artery of native heart without angina pectoris     Hypertriglyceridemia     Essential hypertension     Peripheral arterial disease     Hyperlipidemia with target LDL less than 100     Hx of CABG     Ex-smoker        Venous us L leg  Arterial us L leg  X ray L foot  Continue asa- cad  Continue statin-hlp  Continue coreg, benazapril, hctz-htn    Addendum: Vascular studies shows severe PAD of LLE  Recommend AA and runoff  Risks and benefits explained

## 2019-02-06 NOTE — DISCHARGE INSTRUCTIONS
"Post-op Heart Catheterization    1. DIET: It is advisable for you to follow a diet that limits the intake of salt, sugar, saturated fats and cholesterol.     2. FOR THE NEXT 24 HOURS:   · For the next 8 hours, you should be watched by a responsible adult. This person should make sure your condition is not getting worse.   · Don't drink any alcohol for the next 24 hours.  · Don't drive, operate dangerous machinery, or make important business or personal decisions during the next 24 hours.  · Your healthcare provider may tell you not to take any medicine by mouth for pain or sleep in the next 4 hours. These medicines may react with the medicines you were given in the hospital. This could cause a much stronger response than usual.       3. ACTIVITY:                                Day of discharge:             NO vigorous activity, lifting or straining                                                   Day after discharge:         Avoid heavy lifting (up to 10 lbs)                                                                        The day after discharge you may shower, but avoid tub baths for 5 days                                                                         Wash site gently with soap and water        NO powder or lotion to your procedure site.                 Before you shower, remove dressing, apply bandaid if desired                                                                                                                                                                            2nd day after discharge:  Resume normal activities                                                                         Exercise program as instructed      4. WOUND CARE: It is not unusual to have a small amount of bruising appear in the groin area. It is also common to have a tender "knot" develop beneath the skin at the puncture site in the groin. This is scar tissue only and is not a cause for concern or alarm. " "This tender knot may take several weeks to fully resolve. The bruise will usually spread over several days. If the lump gets bigger, call you doctor immediately.    5. DISCOMFORT: For general discomfort at the puncture site, you may take 1 or 2 Acetaminophen (Tylenol) tablets every 4 hours as needed. (Do not take more than 4000 mg a day)                 6. CALL YOUR HEALTHCARE PROVIDER IF YOU START TO HAVE THE FOLLOWING SYMPTOMS:        1. Problems with the affected leg: Pain, discomfort, loss of warmth, numbness or tingling                                                                                                                            2. Problems at the groin site: Bleeding, pain that is sudden/sharp/persistent,                   swelling at site or a change in "lump" size, increased redness or drainage at                     puncture site                                                               3. High fever (101 degrees or higher)       4.  Drowsiness that doesn't get better       5. Weakness or dizziness that doesn't get better            6. Repeated vomiting        7. GO TO  THE EMERGENCY ROOM OR CALL 911 IF YOU HAVE: Chest pains or discomforts not relieved with 3 nitroglycerin doses (sublingual tablets or spray), numbness or severe pain or if your foot or leg becomes cold or discolored or uncontrolled bleeding from site (apply direct pressure above site).Post-op Heart Catheterization    1. DIET: It is advisable for you to follow a diet that limits the intake of salt, sugar, saturated fats and cholesterol.     2. FOR THE NEXT 24 HOURS:   · For the next 8 hours, you should be watched by a responsible adult. This person should make sure your condition is not getting worse.   · Don't drink any alcohol for the next 24 hours.  · Don't drive, operate dangerous machinery, or make important business or personal decisions during the next 24 hours.  · Your healthcare provider may tell you not to take any " "medicine by mouth for pain or sleep in the next 4 hours. These medicines may react with the medicines you were given in the hospital. This could cause a much stronger response than usual.       3. ACTIVITY:                                Day of discharge:             NO vigorous activity, lifting or straining                                                   Day after discharge:         Avoid heavy lifting (up to 10 lbs)                                                                        The day after discharge you may shower,                but avoid tub baths for 5 days                                                                         Wash site gently with soap and water        NO powder or lotion to your procedure site.                 Before you shower, remove dressing, apply       bandaid if desired                                                                                                                                                                            2nd day after discharge:  Resume normal activities                                                                         Exercise program as instructed      4. WOUND CARE: It is not unusual to have a small amount of bruising appear in the groin area. It is also common to have a tender "knot" develop beneath the skin at the puncture site in the groin. This is scar tissue only and is not a cause for concern or alarm. This tender knot may take several weeks to fully resolve. The bruise will usually spread over several days. If the lump gets bigger, call you doctor immediately.    5. DISCOMFORT: For general discomfort at the puncture site, you may take 1 or 2 Acetaminophen (Tylenol) tablets every 4 hours as needed. (Do not take more than 4000 mg a day)                 6. CALL YOUR HEALTHCARE PROVIDER IF YOU START TO HAVE THE FOLLOWING SYMPTOMS:        1. Problems with the affected leg: Pain, discomfort, loss of warmth, numbness               " "      or tingling                                                                                                                            2. Problems at the groin site: Bleeding, pain that is sudden/sharp/persistent,                   swelling at site or a change in "lump" size, increased redness or drainage at                     puncture site                                                               3. High fever (101 degrees or higher)       4.  Drowsiness that doesn't get better       5. Weakness or dizziness that doesn't get better            6. Repeated vomiting        7. GO TO  THE EMERGENCY ROOM OR CALL 911 IF YOU HAVE: Chest pains or discomforts not relieved with 3 nitroglycerin doses (sublingual tablets or spray), numbness or severe pain or if your foot or leg becomes cold or discolored or uncontrolled bleeding from site (apply direct pressure above site).  "

## 2019-02-07 ENCOUNTER — TELEPHONE (OUTPATIENT)
Dept: CARDIOLOGY | Facility: CLINIC | Age: 69
End: 2019-02-07

## 2019-02-07 NOTE — TELEPHONE ENCOUNTER
02/08 Called and pt wants Dr Cerna and KEVIN Colunga. Told him I would call back once I get scheduled. Cm  02/08 Per Dr House , see Elva if wants to do in [place] or Dr Cerna if he wants done at [facility]. Cm  02/0802/07 Dr oHuse who do you want to refer him to for the surgery for legs?. Pt tells me he can not go to [facility] Can make it to [place]. Cm  02/17 Rtc and spoke with pt and rev'd his aa with runoff. He would prefer to have surgery done in [place]. Told him I would review with Dr House to place referral and what Dr. I will call him back later today. Cm    ----- Message from Hailey Robertson sent at 2/7/2019  8:23 AM CST -----  Contact: Pt  Please give pt a call at ,..[phone] (home) regarding his test he had on yesterday and would like to get some insight on the next step because he didn't really understand yesterday because of the drugs he was given

## 2019-02-08 NOTE — DISCHARGE SUMMARY
[facility] -   Cardiology  Discharge Summary      Patient Name: Ilana Carr  MRN: [id number]  Admission Date: 2/6/2019  Hospital Length of Stay: 0 days  Discharge Date and Time: 2/6/2019  6:01 PM  Attending Physician: No att. providers found  Discharging Provider: Gil House MD  Primary Care Physician: Long Acosta MD    HPI: Pt with BLE claudication and abnormal vascular studies    Procedure(s) (LRB):  AORTOGRAM, WITH RUNOFF (N/A)     Indwelling Lines/Drains at time of discharge:  Lines/Drains/Airways          None          Hospital Course (synopsis of major diagnoses, care, treatment, and services provided during the course of the hospital stay): AA and runoff shows chronically occluded L SFA with reconstitution at L popliteal, recommend L fem-pop bypass    Consults:     Significant Diagnostic Studies: Labs: All labs within the past 24 hours have been reviewed    Pending Diagnostic Studies:     None          Final Active Diagnoses:    Diagnosis Date Noted POA    CAD (coronary artery disease) [I25.10] 09/02/2016 Yes    Ex-smoker [Z87.891] 10/02/2018 Not Applicable    Hx of CABG [Z95.1] 09/20/2016 Not Applicable    Peripheral arterial disease [I73.9]  Yes      Problems Resolved During this Admission:       Discharged Condition: good    Follow Up:    Patient Instructions:   No discharge procedures on file.  Medications:  Reconciled Home Medications:      Medication List      ASK your doctor about these medications    albuterol 90 mcg/actuation inhaler  Commonly known as:  PROVENTIL/VENTOLIN HFA  Inhale 2 puffs into the lungs every 6 (six) hours as needed for Wheezing or Shortness of Breath.     aspirin 81 MG EC tablet  Commonly known as:  ECOTRIN  Take 81 mg by mouth once daily.     atorvastatin 20 MG tablet  Commonly known as:  LIPITOR  Take 1 tablet (20 mg total) by mouth once daily.     benazepril 20 MG tablet  Commonly known as:  LOTENSIN  Take 20 mg by mouth every evening.      budesonide-formoterol 80-4.5 mcg 80-4.5 mcg/actuation Hfaa  Commonly known as:  SYMBICORT  Inhale 2 puffs into the lungs 2 (two) times daily. Controller     buPROPion 300 MG 24 hr tablet  Commonly known as:  WELLBUTRIN XL  Take 300 mg by mouth once daily.     carvedilol 12.5 MG tablet  Commonly known as:  COREG  Take 1 tablet (12.5 mg total) by mouth 2 (two) times daily with meals.     clonazePAM 1 MG tablet  Commonly known as:  KLONOPIN  Take 1 tablet (1 mg total) by mouth 2 (two) times daily.     CO Q-10 100 mg capsule  Generic drug:  coenzyme Q10  Take 100 mg by mouth once daily.     hydroCHLOROthiazide 25 MG tablet  Commonly known as:  HYDRODIURIL  Take 0.5 tablets (12.5 mg total) by mouth once daily.     methadone 10 mg/5 mL solution  Commonly known as:  DOLOPHINE  Take 45 mg by mouth.     multivitamin with minerals tablet  Take 1 tablet by mouth.     nebulizer accessories Kit  by Miscellaneous route. Use as directed     omeprazole 40 MG capsule  Commonly known as:  PRILOSEC  Take 1 capsule (40 mg total) by mouth once daily.     PROBIOTIC-10 ORAL  Take 10 mg by mouth once daily.     tiotropium 18 mcg inhalation capsule  Commonly known as:  SPIRIVA  Inhale 18 mcg into the lungs.     zolpidem 10 mg Tab  Commonly known as:  AMBIEN  Take 10 mg by mouth nightly as needed.            Time spent on the discharge of patient: 30 minutes    Gil House MD  Cardiology  [facility] -

## 2019-02-12 ENCOUNTER — TELEPHONE (OUTPATIENT)
Dept: CARDIOLOGY | Facility: CLINIC | Age: 69
End: 2019-02-12

## 2019-02-12 NOTE — TELEPHONE ENCOUNTER
The patient came in for his site check post AA w R/O on 2/6/19.He ambulated without difficulyt.Denied any discomfort at site,no fever.Site soft,pulse present,auscultated.Puncture site(R) groin intact /closed,non tender. BP /80 P-78 RRR R-16.Reviewed procedure report with patient and wife. He has an appointment with Dr Galvez tomorrow.

## 2019-02-13 ENCOUNTER — OFFICE VISIT (OUTPATIENT)
Dept: VASCULAR SURGERY | Facility: CLINIC | Age: 69
End: 2019-02-13
Payer: MEDICARE

## 2019-02-13 VITALS
HEART RATE: 57 BPM | WEIGHT: 168 LBS | DIASTOLIC BLOOD PRESSURE: 74 MMHG | BODY MASS INDEX: 22.26 KG/M2 | HEIGHT: 73 IN | SYSTOLIC BLOOD PRESSURE: 123 MMHG

## 2019-02-13 DIAGNOSIS — I73.9 PERIPHERAL ARTERIAL DISEASE: Primary | ICD-10-CM

## 2019-02-13 PROCEDURE — 99999 PR PBB SHADOW E&M-EST. PATIENT-LVL III: ICD-10-PCS | Mod: PBBFAC,,, | Performed by: THORACIC SURGERY (CARDIOTHORACIC VASCULAR SURGERY)

## 2019-02-13 PROCEDURE — 99999 PR PBB SHADOW E&M-EST. PATIENT-LVL III: CPT | Mod: PBBFAC,,, | Performed by: THORACIC SURGERY (CARDIOTHORACIC VASCULAR SURGERY)

## 2019-02-13 PROCEDURE — 99213 OFFICE O/P EST LOW 20 MIN: CPT | Mod: PBBFAC,PO | Performed by: THORACIC SURGERY (CARDIOTHORACIC VASCULAR SURGERY)

## 2019-02-13 PROCEDURE — 99212 PR OFFICE/OUTPT VISIT, EST, LEVL II, 10-19 MIN: ICD-10-PCS | Mod: S$PBB,,, | Performed by: THORACIC SURGERY (CARDIOTHORACIC VASCULAR SURGERY)

## 2019-02-13 PROCEDURE — 99212 OFFICE O/P EST SF 10 MIN: CPT | Mod: S$PBB,,, | Performed by: THORACIC SURGERY (CARDIOTHORACIC VASCULAR SURGERY)

## 2019-02-13 NOTE — PROGRESS NOTES
OFFICE NOTE    This is a 68-year-old gentleman with peripheral arterial disease and disabling   left lower extremity claudication and an angiogram at an outside facility   showing what appears to be chronic occlusion of the left superficial femoral   artery and high-grade stenosis of the common femoral artery.  He was referred   for surgical bypass.  He has an extensive past medical history of coronary   artery disease, coronary bypass grafting.  He appears to be doing well from   this.  This was done a few years ago.  He has a history of hepatitis C, but this   is in remission according to the patient after having medical treatment.  His   smoking stopped in 2016.  His medicines are noted.  His family and social   history is fairly unremarkable.    PHYSICAL EXAMINATION:  VITAL SIGNS:  Stable.  HEENT:  Pupils are equal, round, reactive to light.  Nose and throat are clear.  NECK:  Supple.  CHEST:  Clear to auscultation.  HEART:  Regular rate and rhythm.  ABDOMEN:  Benign.  EXTREMITIES:  Femoral pulses are equal.  Pedal pulses diminished bilaterally,   more so on the left compared to the right.  The recent angiogram report is   noted.  He has significant peripheral arterial disease and what appears to be   high-grade stenosis of the common femoral artery and chronic occlusion of the   left superficial femoral artery.      At this point, he will probably need a femoral popliteal bypass grafting and   reconstruction of the common femoral artery.  I have explained this to the   patient.  He seems to be understanding.  We will try to arrange for this in the   near future.      SHELDON  dd: 02/13/2019 14:15:15 (CST)  td: 02/14/2019 07:54:08 (CST)  Doc ID   #[id number]  Job ID #[id number]    CC:

## 2019-02-13 NOTE — LETTER
February 13, 2019      Gil House MD  [address]  [place] [address]           [facility]  [address] [facility] [address]  Phone: [phone]          Patient: Ilana Carr   MR Number: [id number]   YOB: 1950   Date of Visit: 2/13/2019       Dear Dr. Gil House:    Thank you for referring Ilana Carr to me for evaluation. Attached you will find relevant portions of my assessment and plan of care.    If you have questions, please do not hesitate to call me. I look forward to following Ilana Carr along with you.    Sincerely,    Tho Cerna MD    Enclosure  CC:  No Recipients    If you would like to receive this communication electronically, please contact [email or web address] or [phone] to request more information on [employer] Link access.    For providers and/or their staff who would like to refer a patient to [facility], please contact us through our one-stop-shop provider referral line, Gia Watts, at [phone].    If you feel you have received this communication in error or would no longer like to receive these types of communications, please e-mail [email or web address]

## 2019-02-21 ENCOUNTER — TELEPHONE (OUTPATIENT)
Dept: VASCULAR SURGERY | Facility: CLINIC | Age: 69
End: 2019-02-21

## 2019-02-21 NOTE — TELEPHONE ENCOUNTER
----- Message from Dank Abad sent at 2/21/2019 10:34 AM CST -----  Contact: same  Patient called in and stated she was waiting for nurse to call back about a new surgery date at [facility]?    Patient call back number is [phone]

## 2019-02-22 ENCOUNTER — TELEPHONE (OUTPATIENT)
Dept: VASCULAR SURGERY | Facility: CLINIC | Age: 69
End: 2019-02-22

## 2019-02-22 NOTE — TELEPHONE ENCOUNTER
----- Message from Janina Carpenter sent at 2/22/2019 11:52 AM CST -----  Contact: [phone]  Patient is requesting a call back from the nurse want to know when is his surgery scheduled for?    Please call the patient upon request at phone number [phone].

## 2019-03-13 ENCOUNTER — OUTSIDE PLACE OF SERVICE (OUTPATIENT)
Dept: ADMINISTRATIVE | Facility: OTHER | Age: 69
End: 2019-03-13
Payer: MEDICARE

## 2019-03-13 PROCEDURE — 35656 PR BYPASS GRAFT OTHR,FEM-POP: ICD-10-PCS | Mod: LT,,, | Performed by: THORACIC SURGERY (CARDIOTHORACIC VASCULAR SURGERY)

## 2019-03-13 PROCEDURE — 35656 BPG FEMORAL-POPLITEAL: CPT | Mod: LT,,, | Performed by: THORACIC SURGERY (CARDIOTHORACIC VASCULAR SURGERY)

## 2019-03-19 ENCOUNTER — TELEPHONE (OUTPATIENT)
Dept: CARDIOLOGY | Facility: CLINIC | Age: 69
End: 2019-03-19

## 2019-03-19 NOTE — TELEPHONE ENCOUNTER
----- Message from Carole Phillips sent at 3/19/2019 12:04 PM CDT -----  Contact: Marlon- spouse  Marlon is calling in regards to pt's appt on 03/20. Pt's surgery got pushed back and pt is needing to be seen in 2 weeks with Dr. House. Pt was discharged on 03/19 and is needing to follow up sooner than what I have available in May. Please call Marlon back at [phone].      Thanks,   Carole Phillips

## 2019-03-26 ENCOUNTER — TELEPHONE (OUTPATIENT)
Dept: VASCULAR SURGERY | Facility: CLINIC | Age: 69
End: 2019-03-26

## 2019-03-26 NOTE — TELEPHONE ENCOUNTER
----- Message from Eveline Pinto sent at 3/26/2019  4:23 PM CDT -----  Contact: self [phone]  He is calling to follow up on his earlier call regarding the pain medication.  Please call him.  Thank you!

## 2019-03-27 ENCOUNTER — TELEPHONE (OUTPATIENT)
Dept: VASCULAR SURGERY | Facility: CLINIC | Age: 69
End: 2019-03-27

## 2019-03-27 NOTE — TELEPHONE ENCOUNTER
----- Message from Farrah Patel sent at 3/27/2019 10:26 AM CDT -----  Type:  Patient Returning Call    Who Called:  Patient   Who Left Message for Patient:  Ronel  Does the patient know what this is regarding?:  prescription  Best Call Back Number:  [phone]  Additional Information:

## 2019-03-27 NOTE — TELEPHONE ENCOUNTER
Explained to pt that we will inform him of Dr Cerna's decision to refill pain medication Wednesday 3/27. Pt verbalized understanding.

## 2019-03-28 ENCOUNTER — OFFICE VISIT (OUTPATIENT)
Dept: VASCULAR SURGERY | Facility: CLINIC | Age: 69
End: 2019-03-28
Payer: MEDICARE

## 2019-03-28 VITALS
WEIGHT: 173.5 LBS | HEART RATE: 65 BPM | SYSTOLIC BLOOD PRESSURE: 132 MMHG | BODY MASS INDEX: 22.99 KG/M2 | DIASTOLIC BLOOD PRESSURE: 73 MMHG | HEIGHT: 73 IN

## 2019-03-28 DIAGNOSIS — Z95.828 S/P FEMORAL-POPLITEAL BYPASS SURGERY: Primary | ICD-10-CM

## 2019-03-28 PROCEDURE — 99213 OFFICE O/P EST LOW 20 MIN: CPT | Mod: PBBFAC,PO | Performed by: THORACIC SURGERY (CARDIOTHORACIC VASCULAR SURGERY)

## 2019-03-28 PROCEDURE — 99024 POSTOP FOLLOW-UP VISIT: CPT | Mod: POP,,, | Performed by: THORACIC SURGERY (CARDIOTHORACIC VASCULAR SURGERY)

## 2019-03-28 PROCEDURE — 99999 PR PBB SHADOW E&M-EST. PATIENT-LVL III: CPT | Mod: PBBFAC,,, | Performed by: THORACIC SURGERY (CARDIOTHORACIC VASCULAR SURGERY)

## 2019-03-28 PROCEDURE — 99024 PR POST-OP FOLLOW-UP VISIT: ICD-10-PCS | Mod: POP,,, | Performed by: THORACIC SURGERY (CARDIOTHORACIC VASCULAR SURGERY)

## 2019-03-28 PROCEDURE — 99999 PR PBB SHADOW E&M-EST. PATIENT-LVL III: ICD-10-PCS | Mod: PBBFAC,,, | Performed by: THORACIC SURGERY (CARDIOTHORACIC VASCULAR SURGERY)

## 2019-03-28 RX ORDER — OXYCODONE HYDROCHLORIDE 10 MG/1
10 TABLET ORAL EVERY 6 HOURS PRN
Qty: 28 TABLET | Refills: 0
Start: 2019-03-28 | End: 2019-04-10

## 2019-03-28 RX ORDER — TAMSULOSIN HYDROCHLORIDE 0.4 MG/1
CAPSULE ORAL
Refills: 1 | COMMUNITY
Start: 2019-03-18

## 2019-03-28 RX ORDER — GUAIFENESIN 400 MG/1
400 TABLET ORAL
COMMUNITY
End: 2019-06-19

## 2019-03-28 RX ORDER — LEVOTHYROXINE SODIUM 25 UG/1
25 TABLET ORAL
COMMUNITY

## 2019-03-28 RX ORDER — OXYCODONE HYDROCHLORIDE 10 MG/1
TABLET ORAL
COMMUNITY
Start: 2019-03-17 | End: 2019-03-28 | Stop reason: SDUPTHER

## 2019-03-28 NOTE — PROGRESS NOTES
OFFICE NOTE    This is a 68-year-old gentleman, Ilana Carr, who underwent a femoral popliteal   bypass grafting on the left with extensive thromboendarterectomy of the common   femoral artery.  He returns to the office today in followup.  He has swelling of   the leg below the knee, but otherwise is ambulatory and his pain management is   fair.  Medicines are noted.  They are part of the recent EPIC records.  His   problem list is reviewed.    On exam, surgical wounds are clean and dry.  There is no evidence of infection.    He has a palpable pedal pulse consistent with a patent bypass graft.  He does   have edema of the leg, which is mild-to-moderate.    At this point, I reassured them that the swelling will gradually go down that he   should try to keep it elevated when he is not ambulatory or standing.    Otherwise, we have refilled his pain medicine.  He can see us as needed, but we   should get a repeat arterial duplex of the extremity in two or three months and   based on this make further recommendations.      SHELDON  dd: 03/28/2019 10:07:23 (CDT)  td: 03/29/2019 00:42:25 (CDT)  Doc ID   #[id number]  Job ID #[id number]    CC:

## 2019-04-05 ENCOUNTER — OFFICE VISIT (OUTPATIENT)
Dept: CARDIOLOGY | Facility: CLINIC | Age: 69
End: 2019-04-05
Payer: MEDICARE

## 2019-04-05 VITALS
SYSTOLIC BLOOD PRESSURE: 117 MMHG | HEIGHT: 73 IN | BODY MASS INDEX: 23.09 KG/M2 | WEIGHT: 174.19 LBS | HEART RATE: 67 BPM | DIASTOLIC BLOOD PRESSURE: 63 MMHG

## 2019-04-05 DIAGNOSIS — Z95.828 S/P FEMORAL-POPLITEAL BYPASS SURGERY: Chronic | ICD-10-CM

## 2019-04-05 DIAGNOSIS — I73.9 PERIPHERAL ARTERIAL DISEASE: Primary | ICD-10-CM

## 2019-04-05 DIAGNOSIS — I25.10 CORONARY ARTERY DISEASE INVOLVING NATIVE CORONARY ARTERY OF NATIVE HEART WITHOUT ANGINA PECTORIS: ICD-10-CM

## 2019-04-05 DIAGNOSIS — I10 ESSENTIAL HYPERTENSION: ICD-10-CM

## 2019-04-05 DIAGNOSIS — Z95.1 HX OF CABG: ICD-10-CM

## 2019-04-05 DIAGNOSIS — Z87.891 EX-SMOKER: ICD-10-CM

## 2019-04-05 DIAGNOSIS — E78.5 HYPERLIPIDEMIA WITH TARGET LDL LESS THAN 100: ICD-10-CM

## 2019-04-05 PROBLEM — I25.5 ISCHEMIC CARDIOMYOPATHY: Status: RESOLVED | Noted: 2017-01-13 | Resolved: 2019-04-05

## 2019-04-05 PROCEDURE — 99214 OFFICE O/P EST MOD 30 MIN: CPT | Mod: PBBFAC,PO | Performed by: NURSE PRACTITIONER

## 2019-04-05 PROCEDURE — 99213 PR OFFICE/OUTPT VISIT, EST, LEVL III, 20-29 MIN: ICD-10-PCS | Mod: S$PBB,,, | Performed by: NURSE PRACTITIONER

## 2019-04-05 PROCEDURE — 99999 PR PBB SHADOW E&M-EST. PATIENT-LVL IV: ICD-10-PCS | Mod: PBBFAC,,, | Performed by: NURSE PRACTITIONER

## 2019-04-05 PROCEDURE — 99999 PR PBB SHADOW E&M-EST. PATIENT-LVL IV: CPT | Mod: PBBFAC,,, | Performed by: NURSE PRACTITIONER

## 2019-04-05 PROCEDURE — 99213 OFFICE O/P EST LOW 20 MIN: CPT | Mod: S$PBB,,, | Performed by: NURSE PRACTITIONER

## 2019-04-05 NOTE — PROGRESS NOTES
Subjective:    Patient ID:  Ilana Carr is a 68 y.o. male who presents for follow-up of Follow-up      HPI: Mr. Ilana aCrr presents to the clinic for follow up of PAD, S/P left fem-pop bypass by Dr. Cerna, CAD, CABG, HTN, HLP. he stated that he is doing pretty well; he complains of aching pain in the left left from the groin down to the foot. He is not walking much because he is trying to keep leg elevated to try to get swelling down. He stated that he needs refill on his pain medication; he has about 4 days left. He stated that the medication is working, but he has to take it about every 6 hours.  He denied any chest pain or SOB.    He had agent orange exposure and he was a smoker. He does have emphysema.    CABG 9/19/16 by Dr. Rivera.  Denied SHANTI.    Echo 5/1/17: CONCLUSIONS     1 - Concentric hypertrophy.     2 - No wall motion abnormalities.     3 - Normal left ventricular systolic function (EF 55-60%).     4 - Normal left ventricular diastolic function.     5 - Normal right ventricular systolic function .     6 - The estimated PA systolic pressure is 19 mmHg.     Medications: he is not missing any doses.  Sodium: he does not add salt to foods,  he is not reading labels for sodium content.   Exercise: he does not; limited by pain and swelling to the left leg.  Tobacco:Former smoker. no alcohol use.     Weight: 79 kg (174 lb 2.6 oz) he states that his daily weights has been stable Body mass index is 22.98 kg/m².  Wt Readings from Last 3 Encounters:   04/05/19 79 kg (174 lb 2.6 oz)   03/28/19 78.7 kg (173 lb 8 oz)   02/13/19 76.2 kg (168 lb)     BP log: None.     Review of Systems   Constitution: Negative for chills, decreased appetite, fever, night sweats, weight gain and weight loss.   HENT: Negative for congestion.    Cardiovascular: Positive for leg swelling (aching pain in the left leg.). Negative for chest pain, claudication, cyanosis, dyspnea on exertion, irregular heartbeat, near-syncope,  orthopnea, palpitations, paroxysmal nocturnal dyspnea and syncope.   Respiratory: Negative for cough, hemoptysis, shortness of breath, sputum production and wheezing.    Hematologic/Lymphatic: Negative for adenopathy and bleeding problem. Does not bruise/bleed easily.   Skin: Negative for color change and nail changes.   Gastrointestinal: Negative for bloating, abdominal pain, change in bowel habit, heartburn, hematochezia, melena, nausea and vomiting.   Genitourinary: Negative for hematuria.   Neurological: Negative for dizziness and light-headedness.   Psychiatric/Behavioral: Negative for altered mental status.       Objective:   Physical Exam   Constitutional: He is oriented to person, place, and time. He appears well-developed and well-nourished. No distress.   HENT:   Head: Normocephalic and atraumatic.   Eyes: Conjunctivae are normal. No scleral icterus.   Neck: Neck supple. No JVD present. No tracheal deviation present. No thyromegaly present.   Cardiovascular: Normal rate, regular rhythm, normal heart sounds and intact distal pulses.  No extrasystoles are present. Exam reveals no gallop and no friction rub.   No murmur heard.  Pulmonary/Chest: Effort normal. No respiratory distress. He has no wheezes. He has no rales. He exhibits no tenderness.   Abdominal: Soft. Bowel sounds are normal. He exhibits no distension and no mass. There is no tenderness. There is no rebound and no guarding.   Musculoskeletal: Normal range of motion. He exhibits edema (1+ edema to left lower extremity; skin is w/d/pink. incisions noted to left medial lower leg with dry skin incision healing; no erythmea or drainage noted. Incision to left groin healing without signs of inflammation;.).   He does have some hypertrophy of the medial aspect of the scar in left groin, but there is no erythema, swelling, or drainage. Incision in the groin is mildly tender to palpation.  There is no edema to the right leg. Peripheral pulses in tact.    Lymphadenopathy:     He has no cervical adenopathy.   Neurological: He is alert and oriented to person, place, and time.   Skin: Skin is warm. No rash noted. He is not diaphoretic. No erythema. No pallor.   Pink   Psychiatric: He has a normal mood and affect.   Results for TOMA MENDES (MRN [id number]) as of 4/5/2019 09:03   Ref. Range 11/13/2018 07:38   Alkaline Phosphatase Latest Ref Range: 55 - 135 U/L 69   Total Protein Latest Ref Range: 6.0 - 8.4 g/dL 8.6 (H)   Albumin Latest Ref Range: 3.5 - 5.2 g/dL 4.3   Total Bilirubin Latest Ref Range: 0.1 - 1.0 mg/dL 0.5   Bilirubin, Direct Latest Ref Range: 0.1 - 0.3 mg/dL 0.3   AST Latest Ref Range: 10 - 40 U/L 21   ALT Latest Ref Range: 10 - 44 U/L 21   Triglycerides Latest Ref Range: 30 - 150 mg/dL 95   Cholesterol Latest Ref Range: 120 - 199 mg/dL 151   HDL Latest Ref Range: 40 - 75 mg/dL 51   HDL/Chol Ratio Latest Ref Range: 20.0 - 50.0 % 33.8   LDL Cholesterol Latest Ref Range: 63.0 - 159.0 mg/dL 81.0   Non-HDL Cholesterol Latest Units: mg/dL 100   Total Cholesterol/HDL Ratio Latest Ref Range: 2.0 - 5.0  3.0          Assessment:      1. Peripheral arterial disease    2. S/P femoral-popliteal bypass surgery    3. Coronary artery disease involving native coronary artery of native heart without angina pectoris    4. Hx of CABG    5. Hyperlipidemia with target LDL less than 100    6. Essential hypertension    7. Ex-smoker        Plan:   Sent message to Dr. Cerna regarding pain medication.  Reviewed cath report with Mr. Mendes and answered his questions.  Medical therapy for right lower extremity.  Encouraged walking.  Elevate legs when seated.  Continue ASA, benazepril, coreg, atorvastatin - CAD, PAD, HLP.  Continue benazepril, coreg, and HCTZ - HTN  Follow up in 10 weeks with Dr. House or call sooner for any problems.

## 2019-04-08 ENCOUNTER — TELEPHONE (OUTPATIENT)
Dept: VASCULAR SURGERY | Facility: CLINIC | Age: 69
End: 2019-04-08

## 2019-04-08 NOTE — TELEPHONE ENCOUNTER
Pt calling reporting groin incision opening up after taking shower. Advised to go to ER for eval since Dr. Cerna is currently in surgery and not available. Pt states understanding.

## 2019-04-08 NOTE — TELEPHONE ENCOUNTER
----- Message from Ladi Evans sent at 4/8/2019  4:14 PM CDT -----  Contact: pt  Type: Needs Medical Advice    Who Called:  Pt      Best Call Back Number: [phone] (home)   Additional Information: pt said he needs a nurse to call him back the surgery incision has come undone would like advice as to what he is to do should he come in or not.

## 2019-04-10 ENCOUNTER — PATIENT OUTREACH (OUTPATIENT)
Dept: ADMINISTRATIVE | Facility: CLINIC | Age: 69
End: 2019-04-10

## 2019-04-10 RX ORDER — CEFUROXIME AXETIL 250 MG/1
250 TABLET ORAL 2 TIMES DAILY
COMMUNITY
End: 2019-06-19

## 2019-04-10 NOTE — PATIENT INSTRUCTIONS
Recognizing and Treating Wound Infection  Wounds can become infected with harmful germs (bacteria). This prevents healing. It also increases your risk of scars. In some cases, the infection may spread to other parts of your body. And infection with the bacteria that cause tetanus can be fatal. Know what to look for and get prompt treatment for infection.    Risk factors  A wound is more likely to become infected if it:  · Results from a hole (puncture), such as from a nail or piece of glass  · Results from a human or animal bite  · Isn't cleaned or treated within 8 hours  · Occurs in your hand, foot, leg, armpit, or groin (the area where your belly meets your thighs)  · Contains dirt or saliva  · Heals very slowly  · Occurs in a person with diabetes, alcoholism, or a compromised immune system    Symptoms of infection  Call your healthcare provider at the first sign of infection, such as:  · Yellow, yellow-green, or foul-smelling drainage from a wound  · More pain, swelling, or redness in or near a wound  · A change in the color or size of a wound  · Red streaks in the skin around the wound  · Fever  Treatment  Treatment is likely to depend on the type of infection you have, and how serious it is. Your healthcare provider may prescribe oral antibiotics to help fight bacteria. Your provider may also flush the wound with an antibiotic solution or apply an antibiotic ointment. Sometimes a pocket of pus (abscess) may form. In that case, the abscess will be opened and the fluid drained. You may need hospital care if the infection is very severe.  Preventing wound infection  Follow these steps to help keep wounds from getting infected:  · Wash the wound right away with soap and water.  · Apply a small amount of antibiotic ointment. You can buy this without a prescription.  · Cover wounds with a bandage or gauze dressing. Change daily.  · Keep the wound clean and dry for the first 24 hours.  · Change the dressing daily  using sterile gloves.   Date Last Reviewed: 8/13/2015  © 9138-0293 [employer]. [address] [place] [address], [place] [address]. All rights reserved. This information is not intended as a substitute for professional medical care. Always follow your healthcare professional's instructions.

## 2019-04-12 ENCOUNTER — PATIENT MESSAGE (OUTPATIENT)
Dept: FAMILY MEDICINE | Facility: CLINIC | Age: 69
End: 2019-04-12

## 2019-04-12 ENCOUNTER — OFFICE VISIT (OUTPATIENT)
Dept: FAMILY MEDICINE | Facility: CLINIC | Age: 69
End: 2019-04-12
Payer: MEDICARE

## 2019-04-12 VITALS
HEIGHT: 72 IN | BODY MASS INDEX: 23.65 KG/M2 | WEIGHT: 174.63 LBS | SYSTOLIC BLOOD PRESSURE: 96 MMHG | HEART RATE: 59 BPM | TEMPERATURE: 98 F | DIASTOLIC BLOOD PRESSURE: 62 MMHG

## 2019-04-12 DIAGNOSIS — I73.9 PERIPHERAL ARTERIAL DISEASE: ICD-10-CM

## 2019-04-12 DIAGNOSIS — T81.31XD DEHISCENCE OF OPERATIVE WOUND, SUBSEQUENT ENCOUNTER: Primary | ICD-10-CM

## 2019-04-12 DIAGNOSIS — I10 ESSENTIAL HYPERTENSION: ICD-10-CM

## 2019-04-12 DIAGNOSIS — Z95.828 S/P FEMORAL-POPLITEAL BYPASS SURGERY: Chronic | ICD-10-CM

## 2019-04-12 PROCEDURE — 99214 OFFICE O/P EST MOD 30 MIN: CPT | Mod: PBBFAC,PO | Performed by: FAMILY MEDICINE

## 2019-04-12 PROCEDURE — 99495 TCM SERVICES (MODERATE COMPLEXITY): ICD-10-PCS | Mod: S$PBB,,, | Performed by: FAMILY MEDICINE

## 2019-04-12 PROCEDURE — 99999 PR PBB SHADOW E&M-EST. PATIENT-LVL IV: ICD-10-PCS | Mod: PBBFAC,,, | Performed by: FAMILY MEDICINE

## 2019-04-12 PROCEDURE — 99495 TRANSJ CARE MGMT MOD F2F 14D: CPT | Mod: PBBFAC,PO | Performed by: FAMILY MEDICINE

## 2019-04-12 PROCEDURE — 99495 TRANSJ CARE MGMT MOD F2F 14D: CPT | Mod: S$PBB,,, | Performed by: FAMILY MEDICINE

## 2019-04-12 PROCEDURE — 99999 PR PBB SHADOW E&M-EST. PATIENT-LVL IV: CPT | Mod: PBBFAC,,, | Performed by: FAMILY MEDICINE

## 2019-04-12 RX ORDER — BENAZEPRIL HYDROCHLORIDE 40 MG/1
40 TABLET ORAL DAILY
COMMUNITY
End: 2021-09-29 | Stop reason: SDUPTHER

## 2019-04-12 RX ORDER — METHADONE HYDROCHLORIDE 10 MG/5ML
40 SOLUTION ORAL EVERY MORNING
COMMUNITY

## 2019-04-12 RX ORDER — HYDROCHLOROTHIAZIDE 50 MG/1
50 TABLET ORAL DAILY
COMMUNITY
End: 2019-04-12

## 2019-04-12 RX ORDER — CARVEDILOL 25 MG/1
12.5 TABLET ORAL DAILY
COMMUNITY

## 2019-04-12 NOTE — PROGRESS NOTES
Follow-up hospitalization as per below discharge summary.  He is doing wound care at home with his wife.  No fever chills.  No drainage. Blood pressure controlled, on the low side.  Somewhat unclear about medication as [facility]    Paul Bledsoe MD - 04/09/2019 12:13 PM CDT  Formatting of this note might be different from the original.  [facility] DISCHARGE SUMMARY  Patient ID:  Ilana Carr  [id number]  68 y.o.  1950    Admit Date:   4/8/2019 5:01 PM    Discharge Date:   Discharge Today: 4/9/2019    Admitting Physician:   Paul Bledsoe MD     Discharge Physician:   PAUL BLEDSOE MD    Consults:  Consultants   Provider Service Role Specialty   Tho Cerna MD Cardiothoracic Surgery Consulting Physician Cardiothoracic Surgery       Reason for Admission/Admission Diagnoses:   Present on Admission:   Wound dehiscence    Discharge Diagnoses:   Active Hospital Problems   Diagnosis Date Noted    Wound dehiscence 04/08/2019     Resolved Hospital Problems     History of Present Illness:   68-year-old  male with a history of severe vascular disease status post left fem-pop bypass approximately 2-3 weeks ago. This was performed by Dr. Cerna cardiothoracic surgery. Patient had an acute opening of the left groin wound with dehiscence. He presented to the emergency room and was admitted. Dr. Cerna evaluated the wound earlier this morning. He did not feel the patient required any further sharp debridement. He recommended applying wet-to-dry dressings and oral antibiotics. Patient to be discharged with follow-up appointment in his clinic. Jerry in 1 week. Continue wet to dry dressings. The patient's spouse will be started prior to discharge on performing wet-to-dry dressings. Strongly encouraged to stop smoking at all cost. Continue methadone for pain control.    Hospital Course and Treatment:   Admission Information   Date & Time  4/8/2019 Provider  Paul Bledsoe MD  [facility]  [facility] Phone  [phone]           I discussed with the patient disease process and treatment.     I have personally seen and examined the patient, Ilana Carr, in a face to face encounter on the date of discharge.     He is cleared for discharge with instructions to follow up as directed.   Total time in the care and discharge planning of this patient was greater than 30 minutes.      Past Medical History:  Past Medical History:   Diagnosis Date    Alcoholism in remission     Anxiety     CAD (coronary artery disease) 9/2/2016    Chronic back pain     pain mgt    Chronic hepatitis C without hepatic coma 1/8/2016    Colon polyps     COPD (chronic obstructive pulmonary disease)     Depressed left ventricular systolic function 9/6/2016    Depression     Diverticulosis     DJD (degenerative joint disease) of hip     Emphysema of lung     Esophageal ulcer with bleeding     GERD (gastroesophageal reflux disease)     Hepatitis C     completed Interferon therapy    Hiatal hernia     Hyperlipidemia with target LDL less than 100 1/7/2015    Hypertension     Hypertriglyceridemia     Opioid dependence in controlled environment 4/16/2018    Osteomyelitis of right foot     Peripheral arterial disease     PTSD (post-traumatic stress disorder)     Spells     last Sept 2013, not sure if it was TIA v seizure    Squamous cell skin cancer     Testosterone deficiency     Tubular adenoma of colon      Past Surgical History:   Procedure Laterality Date    AORTOGRAM, WITH RUNOFF N/A 2/6/2019    [facility]formed [facility] Gil [facility] at [facility] C[facility] LAB    CARDIAC CATHETERIZATION      COLONOSCOPY  2008    COLONOSCOPY N/A 1/8/2016    Performed by Amauri Delatorre MD at [facility] ENDO    CORONARY ARTERY BYPASS GRAFT  09/09/2016    DEBRIDEMENT-FOOT Right 3/21/2014    [facility] OR    EXCISION, BONE SPUR, FOOT Right 12/6/2013     Per[facility] at [facility] OR    fem pop bypass-left Left 2019    FOOT SURGERY Right 2013    fifth metatarsal head resection [Other]    ORIF WRIST FRACTURE      LT    REVISION ENTHESIS Right 2013    Per[facility] at [facility] OR    SHOULDER SURGERY      left    TONSILLECTOMY      UPPER GASTROINTESTINAL ENDOSCOPY  2016, 2010     Social History     Socioeconomic History    Marital status:      Spouse name: Not on file    Number of children: Not on file    Years of education: Not on file    Highest education level: Not on file   Occupational History    Not on file   Social Needs    Financial resource strain: Not on file    Food insecurity:     Worry: Not on file     Inability: Not on file    Transportation needs:     Medical: Not on file     Non-medical: Not on file   Tobacco Use    Smoking status: Former Smoker     Packs/day: 1.50     Years: 40.00     Pack years: 60.00     Types: Cigarettes, Cigars     Last attempt to quit: 2016     Years since quittin.5    Smokeless tobacco: Former User     Quit date: 2016   Substance and Sexual Activity    Alcohol use: No     Alcohol/week: 0.0 oz     Comment: quit 30 y- prior alcoholism    Drug use: Yes     Comment: methadone     Sexual activity: Not on file   Lifestyle    Physical activity:     Days per week: Not on file     Minutes per session: Not on file    Stress: Not on file   Relationships    Social connections:     Talks on phone: Not on file     Gets together: Not on file     Attends [employer]ism service: Not on file     Active member of club or organization: Not on file     Attends meetings of clubs or organizations: Not on file     Relationship status: Not on file   Other Topics Concern    Not on file   Social History Narrative    Not on file     Family History   Problem Relation Age of Onset    Lung cancer Mother     Hypertension Mother     Hypertension Father     Arrhythmia  Brother 60    Heart disease Daughter     Heart attacks under age 50 Daughter     Colon cancer Neg Hx     Stomach cancer Neg Hx      Review of patient's allergies indicates:   Allergen Reactions    Acetaminophen Other (See Comments)     H/o HEP C (PT DOES NOT WANT)     Current Outpatient Medications on File Prior to Visit   Medication Sig Dispense Refill    albuterol (PROVENTIL/VENTOLIN HFA) 90 mcg/actuation inhaler Inhale 2 puffs into the lungs every 6 (six) hours as needed for Wheezing or Shortness of Breath. 18 g 5    aspirin (ECOTRIN) 81 MG EC tablet Take 81 mg by mouth once daily.      atorvastatin (LIPITOR) 20 MG tablet Take 1 tablet (20 mg total) by mouth once daily. 90 tablet 2    benazepril (LOTENSIN) 40 MG tablet Take 40 mg by mouth once daily. One tablet nightly ([facility] supplied)      budesonide-formoterol 80-4.5 mcg (SYMBICORT) 80-4.5 mcg/actuation HFAA Inhale 2 puffs into the lungs 2 (two) times daily. Controller       buPROPion (WELLBUTRIN XL) 300 MG 24 hr tablet Take 150 mg by mouth once daily.       carvedilol (COREG) 25 MG tablet Take 25 mg by mouth 2 (two) times daily with meals. Patient taking 1/2 tablet twice daily ([facility] supplied)      cefUROXime (CEFTIN) 250 MG tablet Take 250 mg by mouth 2 (two) times daily.      clonazePAM (KLONOPIN) 1 MG tablet Take 1 tablet (1 mg total) by mouth 2 (two) times daily. (Patient taking differently: Take 1 mg by mouth as needed. ) 60 tablet 1    coenzyme Q10 (CO Q-10) 100 mg capsule Take 100 mg by mouth once daily.      guaiFENesin (HUMIBID E) 400 mg Tab Take 400 mg by mouth.      LACTOBAC NO.41/BIFIDOBACT NO.7 (PROBIOTIC-10 ORAL) Take 10 mg by mouth once daily.      levothyroxine (SYNTHROID) 25 MCG tablet Take 25 mcg by mouth.      methadone (DOLOPHINE) 10 mg/5 mL solution Take 55 mg by mouth once daily.      nebulizer accessories Kit by Miscellaneous route. Use as directed      omeprazole (PRILOSEC) 40 MG capsule Take 1 capsule (40 mg total) by  mouth once daily. 90 capsule 1    ranitidine (ZANTAC) 15 mg/mL syrup Take 75 mg by mouth every 24 hours as needed for Heartburn.      tamsulosin (FLOMAX) 0.4 mg Cap TAKE ONE CAPSULE BY MOUTH EVERY DAY AFTER BREAKFAST  1    tiotropium (SPIRIVA) 18 mcg inhalation capsule Inhale 18 mcg into the lungs.      zolpidem (AMBIEN) 10 mg Tab Take 10 mg by mouth nightly as needed.      [DISCONTINUED] benazepril (LOTENSIN) 20 MG tablet Take 20 mg by mouth every evening.      [DISCONTINUED] carvedilol (COREG) 12.5 MG tablet Take 1 tablet (12.5 mg total) by mouth 2 (two) times daily with meals. 180 tablet 2    [DISCONTINUED] hydroCHLOROthiazide (HYDRODIURIL) 25 MG tablet Take 0.5 tablets (12.5 mg total) by mouth once daily.      [DISCONTINUED] hydroCHLOROthiazide (HYDRODIURIL) 50 MG tablet Take 50 mg by mouth once daily.      [DISCONTINUED] methadone HCl (METHADONE, BULK, MISC) 55 mg by Misc.(Non-Drug; Combo Route) route.       No current facility-administered medications on file prior to visit.            ROS:  GENERAL: No fever, chills,  or significant weight changes.   CARDIOVASCULAR: Denies chest pain, PND, orthopnea or reduced exercise tolerance.  ABDOMEN: Appetite fine. Denies diarrhea, abdominal pain, hematemesis or blood in stool.  URINARY: No flank pain, dysuria or hematuria.      OBJECTIVE:     Vitals:    04/12/19 0928   BP: 96/62   Pulse: (!) 59   Temp: 98.3 °F (36.8 °C)   TempSrc: Oral   Weight: 79.2 kg (174 lb 9.6 oz)   Height: 6' (1.829 m)     Wt Readings from Last 3 Encounters:   04/12/19 79.2 kg (174 lb 9.6 oz)   04/05/19 79 kg (174 lb 2.6 oz)   03/28/19 78.7 kg (173 lb 8 oz)     APPEARANCE: Well nourished, well developed, in no acute distress.    HEAD: Normocephalic.  Atraumatic.  No sinus tenderness.  EYES:   Right eye: Pupil reactive.  Conjunctiva clear.    Left eye: Pupil reactive.  Conjunctiva clear.    Both fundi:  Grossly normal to nondilated exam. EOMI.    EARS: TM's intact. Light reflex normal. No  retraction or perforation.    NOSE:  clear.  MOUTH & THROAT:  No pharyngeal erythema or exudate. No lesions.  NECK: Supple. No bruits.  No JVD.  No cervical lymphadenopathy.  No thyromegaly.    CHEST: Breath sounds clear bilaterally.  Normal respiratory effort  CARDIOVASCULAR: Normal rate.  Regular rhythm.  No murmurs.  No rub.  No gallops.  ABDOMEN: Bowel sounds normal.  Soft.  No tenderness.  No organomegaly.  PERIPHERAL VASCULAR: No cyanosis.  No clubbing.  No edema.  NEUROLOGIC: No focal findings.  MENTAL STATUS: Alert.  Oriented x 3.  Wound dehiscence noted left groin.  Dressing change.  No purulence.  Not particularly tender.      Ilana was seen today for hosp fu/wound dehiscence/groin.    Diagnoses and all orders for this visit:    Dehiscence of operative wound, subsequent encounter    S/P femoral-popliteal bypass surgery    Peripheral arterial disease    Essential hypertension      Continue dressing changes twice a day wet to dry as per recommendations from his vascular surgeon.  Continue follow-up appointment with vascular surgery.  He will let us know what blood pressure medications he is take any and we will cut back probably on the hydrochlorothiazide.  Monitor blood pressure    Transitional Care Note    Family and/or Caretaker present at visit?  Yes.  Diagnostic tests reviewed/disposition: No diagnosic tests pending after this hospitalization.  Disease/illness education:  Yes  Home health/community services discussion/referrals: Patient does not have home health established from hospital visit.  They do not need home health.  If needed, we will set up home health for the patient.   Establishment or re-establishment of referral orders for community resources: No other necessary community resources.   Discussion with other health care providers: No discussion with other health care providers necessary.

## 2019-04-12 NOTE — PATIENT INSTRUCTIONS
Please send in corrected medication list particularly dosing on hydrochlorothiazide, carvedilol, and benazepril.

## 2019-04-12 NOTE — TELEPHONE ENCOUNTER
Hello,  Recommend stop taking the hydrochlorothiazide altogether for right now and will see what the blood pressure looks like with this.  I will have my nurse contact you to get a blood pressure check scheduled for a couple of weeks.  thanks,  Dr. Acosta

## 2019-04-18 ENCOUNTER — OFFICE VISIT (OUTPATIENT)
Dept: VASCULAR SURGERY | Facility: CLINIC | Age: 69
End: 2019-04-18
Payer: MEDICARE

## 2019-04-18 VITALS
SYSTOLIC BLOOD PRESSURE: 152 MMHG | WEIGHT: 176.38 LBS | HEART RATE: 69 BPM | DIASTOLIC BLOOD PRESSURE: 86 MMHG | HEIGHT: 72 IN | BODY MASS INDEX: 23.89 KG/M2

## 2019-04-18 DIAGNOSIS — Z48.89 ENCOUNTER FOR POST SURGICAL WOUND CHECK: Primary | ICD-10-CM

## 2019-04-18 PROCEDURE — 99999 PR PBB SHADOW E&M-EST. PATIENT-LVL III: CPT | Mod: PBBFAC,,, | Performed by: THORACIC SURGERY (CARDIOTHORACIC VASCULAR SURGERY)

## 2019-04-18 PROCEDURE — 99024 POSTOP FOLLOW-UP VISIT: CPT | Mod: POP,,, | Performed by: THORACIC SURGERY (CARDIOTHORACIC VASCULAR SURGERY)

## 2019-04-18 PROCEDURE — 99999 PR PBB SHADOW E&M-EST. PATIENT-LVL III: ICD-10-PCS | Mod: PBBFAC,,, | Performed by: THORACIC SURGERY (CARDIOTHORACIC VASCULAR SURGERY)

## 2019-04-18 PROCEDURE — 99024 PR POST-OP FOLLOW-UP VISIT: ICD-10-PCS | Mod: POP,,, | Performed by: THORACIC SURGERY (CARDIOTHORACIC VASCULAR SURGERY)

## 2019-04-18 PROCEDURE — 99213 OFFICE O/P EST LOW 20 MIN: CPT | Mod: PBBFAC,PO | Performed by: THORACIC SURGERY (CARDIOTHORACIC VASCULAR SURGERY)

## 2019-04-18 RX ORDER — OXYCODONE AND ACETAMINOPHEN 10; 325 MG/1; MG/1
1 TABLET ORAL EVERY 4 HOURS PRN
Qty: 28 TABLET | Refills: 0
Start: 2019-04-18 | End: 2019-06-19

## 2019-04-18 NOTE — PROGRESS NOTES
OFFICE NOTE    HISTORY OF PRESENT ILLNESS:  This is a 68-year-old gentleman status post left   femoral popliteal bypass grafting.  He returns to the office in followup.  I saw   him in the hospital when he was readmitted after surgery for a wound dehiscence   in the groin.  He since has been home with oral antibiotics and local wound   care.  He has no major complaints today.  There is less swelling in the   extremity and he is ambulatory.    On exam, his surgical wounds are clean and in particular within the left groin,   there is separation of the skin into the subcutaneous tissue.  It does not   appear that this penetrates any deeper into the vascular structures.  The wound   today was debrided and packed wet-to-dry.  The patient should continue local   wound care and dressing changes as presently done and plan to see me within the   next one to two weeks.  If any issues or problems that develop in the interim,   he should call the office.  He was given a refill for his Percocet.      FAISAL/IN  dd: 04/18/2019 08:16:10 (CDT)  td: 04/18/2019 18:30:57 (CDT)  Doc ID   #[id number]  Job ID #[id number]    CC:

## 2019-04-25 ENCOUNTER — OFFICE VISIT (OUTPATIENT)
Dept: VASCULAR SURGERY | Facility: CLINIC | Age: 69
End: 2019-04-25
Payer: MEDICARE

## 2019-04-25 VITALS
SYSTOLIC BLOOD PRESSURE: 130 MMHG | HEIGHT: 72 IN | HEART RATE: 63 BPM | BODY MASS INDEX: 24.4 KG/M2 | WEIGHT: 180.13 LBS | DIASTOLIC BLOOD PRESSURE: 79 MMHG

## 2019-04-25 DIAGNOSIS — Z51.89 ENCOUNTER FOR WOUND CARE: Primary | ICD-10-CM

## 2019-04-25 PROCEDURE — 99999 PR PBB SHADOW E&M-EST. PATIENT-LVL III: CPT | Mod: PBBFAC,,, | Performed by: THORACIC SURGERY (CARDIOTHORACIC VASCULAR SURGERY)

## 2019-04-25 PROCEDURE — 99024 POSTOP FOLLOW-UP VISIT: CPT | Mod: POP,,, | Performed by: THORACIC SURGERY (CARDIOTHORACIC VASCULAR SURGERY)

## 2019-04-25 PROCEDURE — 99999 PR PBB SHADOW E&M-EST. PATIENT-LVL III: ICD-10-PCS | Mod: PBBFAC,,, | Performed by: THORACIC SURGERY (CARDIOTHORACIC VASCULAR SURGERY)

## 2019-04-25 PROCEDURE — 99213 OFFICE O/P EST LOW 20 MIN: CPT | Mod: PBBFAC,PO | Performed by: THORACIC SURGERY (CARDIOTHORACIC VASCULAR SURGERY)

## 2019-04-25 PROCEDURE — 99024 PR POST-OP FOLLOW-UP VISIT: ICD-10-PCS | Mod: POP,,, | Performed by: THORACIC SURGERY (CARDIOTHORACIC VASCULAR SURGERY)

## 2019-04-26 ENCOUNTER — PATIENT MESSAGE (OUTPATIENT)
Dept: FAMILY MEDICINE | Facility: CLINIC | Age: 69
End: 2019-04-26

## 2019-04-26 ENCOUNTER — CLINICAL SUPPORT (OUTPATIENT)
Dept: FAMILY MEDICINE | Facility: CLINIC | Age: 69
End: 2019-04-26
Payer: MEDICARE

## 2019-04-26 VITALS — DIASTOLIC BLOOD PRESSURE: 65 MMHG | HEART RATE: 61 BPM | SYSTOLIC BLOOD PRESSURE: 120 MMHG

## 2019-04-26 DIAGNOSIS — Z01.30 BLOOD PRESSURE CHECK: Primary | ICD-10-CM

## 2019-04-26 NOTE — PROGRESS NOTES
OFFICE NOTE    HISTORY OF PRESENT ILLNESS:  This is a 68-year-old gentleman status post left   femoral popliteal bypass grafting.  He returns to the office today in followup.    He has had an open wound in the left groin over the last couple of weeks.  It   is slowly improving.  He comes back today for a wound check.  His medicines are   noted.  They are part of recent EPIC record.  His problem list is reviewed.    PHYSICAL EXAMINATION:  Perfusion to the left leg and foot is satisfactory.  He   has an open wound in the left groin, which was examined and then redressed.  It   is slowly granulating.  It has improved since his last visit.    I explained to him he should continue the wound care as presently done and   continue wet-to-dry dressings over the next couple of weeks.  If the wound does   not heal within two weeks, I explained to him that he should come back to see   me.  Otherwise, we should get a repeat arterial duplex of the left lower   extremity in about 2 to 3 months.      SHELDON  dd: 04/25/2019 10:40:18 (CDT)  td: 04/26/2019 00:16:08 (ELVIN)  Doc ID   #[id number]  Job ID #[id number]    CC:

## 2019-05-02 ENCOUNTER — TELEPHONE (OUTPATIENT)
Dept: VASCULAR SURGERY | Facility: CLINIC | Age: 69
End: 2019-05-02

## 2019-05-02 NOTE — TELEPHONE ENCOUNTER
----- Message from Farrah Patel sent at 5/2/2019 10:28 AM CDT -----  Type: Needs Medical Advice    Who Called:  Patient   Best Call Back Number: [phone]  Additional Information: changing bandage twice a day for about 3 week would like to know if he can start change 1 time per day. Bandages are causing a rash.     Thank you

## 2019-05-29 ENCOUNTER — LAB VISIT (OUTPATIENT)
Dept: LAB | Facility: HOSPITAL | Age: 69
End: 2019-05-29
Attending: OTOLARYNGOLOGY
Payer: MEDICARE

## 2019-05-29 DIAGNOSIS — Z12.5 SPECIAL SCREENING FOR MALIGNANT NEOPLASM OF PROSTATE: Primary | ICD-10-CM

## 2019-05-29 LAB — COMPLEXED PSA SERPL-MCNC: 0.71 NG/ML (ref 0–4)

## 2019-05-29 PROCEDURE — 84153 ASSAY OF PSA TOTAL: CPT

## 2019-05-29 PROCEDURE — 36415 COLL VENOUS BLD VENIPUNCTURE: CPT | Mod: PO

## 2019-06-19 ENCOUNTER — OFFICE VISIT (OUTPATIENT)
Dept: CARDIOLOGY | Facility: CLINIC | Age: 69
End: 2019-06-19
Payer: MEDICARE

## 2019-06-19 VITALS
DIASTOLIC BLOOD PRESSURE: 78 MMHG | HEIGHT: 72 IN | WEIGHT: 172.06 LBS | SYSTOLIC BLOOD PRESSURE: 130 MMHG | HEART RATE: 56 BPM | BODY MASS INDEX: 23.31 KG/M2

## 2019-06-19 DIAGNOSIS — E78.5 HYPERLIPIDEMIA WITH TARGET LDL LESS THAN 100: ICD-10-CM

## 2019-06-19 DIAGNOSIS — Z95.828 S/P FEMORAL-POPLITEAL BYPASS SURGERY: Chronic | ICD-10-CM

## 2019-06-19 DIAGNOSIS — I25.10 CORONARY ARTERY DISEASE INVOLVING NATIVE CORONARY ARTERY OF NATIVE HEART WITHOUT ANGINA PECTORIS: Primary | ICD-10-CM

## 2019-06-19 DIAGNOSIS — E78.1 HYPERTRIGLYCERIDEMIA: ICD-10-CM

## 2019-06-19 DIAGNOSIS — I73.9 PERIPHERAL ARTERIAL DISEASE: ICD-10-CM

## 2019-06-19 DIAGNOSIS — Z95.1 HX OF CABG: ICD-10-CM

## 2019-06-19 PROCEDURE — 99213 OFFICE O/P EST LOW 20 MIN: CPT | Mod: PBBFAC,PO | Performed by: INTERNAL MEDICINE

## 2019-06-19 PROCEDURE — 99214 PR OFFICE/OUTPT VISIT, EST, LEVL IV, 30-39 MIN: ICD-10-PCS | Mod: S$PBB,,, | Performed by: INTERNAL MEDICINE

## 2019-06-19 PROCEDURE — 99999 PR PBB SHADOW E&M-EST. PATIENT-LVL III: ICD-10-PCS | Mod: PBBFAC,,, | Performed by: INTERNAL MEDICINE

## 2019-06-19 PROCEDURE — 99999 PR PBB SHADOW E&M-EST. PATIENT-LVL III: CPT | Mod: PBBFAC,,, | Performed by: INTERNAL MEDICINE

## 2019-06-19 PROCEDURE — 99214 OFFICE O/P EST MOD 30 MIN: CPT | Mod: S$PBB,,, | Performed by: INTERNAL MEDICINE

## 2019-06-19 NOTE — PROGRESS NOTES
Subjective:   Patient ID:  Ilana Carr is a 68 y.o. male who presents for follow-up of 3 mo f/u  Pt is s/p L fem-pop bypass . Patient denies CP, angina or anginal equivalent.    Hypertension   This is a chronic problem. The current episode started more than 1 year ago. The problem has been gradually improving since onset. The problem is controlled. Pertinent negatives include no chest pain, palpitations or shortness of breath. Past treatments include beta blockers and ACE inhibitors. The current treatment provides moderate improvement. There are no compliance problems.    Hyperlipidemia   This is a chronic problem. The current episode started more than 1 year ago. The problem is controlled. Recent lipid tests were reviewed and are variable. Pertinent negatives include no chest pain or shortness of breath. Current antihyperlipidemic treatment includes statins. The current treatment provides moderate improvement of lipids. There are no compliance problems.        Review of Systems   Constitution: Negative. Negative for weight gain.   HENT: Negative.    Eyes: Negative.    Cardiovascular: Negative.  Negative for chest pain, leg swelling and palpitations.   Respiratory: Negative.  Negative for shortness of breath.    Endocrine: Negative.    Hematologic/Lymphatic: Negative.    Skin: Negative.    Musculoskeletal: Negative for muscle weakness.   Gastrointestinal: Negative.    Genitourinary: Negative.    Neurological: Negative.  Negative for dizziness.   Psychiatric/Behavioral: Negative.    Allergic/Immunologic: Negative.      Family History   Problem Relation Age of Onset    Lung cancer Mother     Hypertension Mother     Hypertension Father     Arrhythmia Brother 60    Heart disease Daughter     Heart attacks under age 50 Daughter     Colon cancer Neg Hx     Stomach cancer Neg Hx      Past Medical History:   Diagnosis Date    Alcoholism in remission     Anxiety     CAD (coronary artery disease) 9/2/2016     Chronic back pain     pain mgt    Chronic hepatitis C without hepatic coma 2016    Colon polyps     COPD (chronic obstructive pulmonary disease)     Depressed left ventricular systolic function 2016    Depression     Diverticulosis     DJD (degenerative joint disease) of hip     Emphysema of lung     Esophageal ulcer with bleeding     GERD (gastroesophageal reflux disease)     Hepatitis C     completed Interferon therapy    Hiatal hernia     Hyperlipidemia with target LDL less than 100 2015    Hypertension     Hypertriglyceridemia     Opioid dependence in controlled environment 2018    Osteomyelitis of right foot     Peripheral arterial disease     PTSD (post-traumatic stress disorder)     Spells     last 2013, not sure if it was TIA v seizure    Squamous cell skin cancer     Testosterone deficiency     Tubular adenoma of colon      Social History     Socioeconomic History    Marital status:      Spouse name: Not on file    Number of children: Not on file    Years of education: Not on file    Highest education level: Not on file   Occupational History    Not on file   Social Needs    Financial resource strain: Not on file    Food insecurity:     Worry: Not on file     Inability: Not on file    Transportation needs:     Medical: Not on file     Non-medical: Not on file   Tobacco Use    Smoking status: Former Smoker     Packs/day: 1.50     Years: 40.00     Pack years: 60.00     Types: Cigarettes, Cigars     Last attempt to quit: 2016     Years since quittin.7    Smokeless tobacco: Former User     Quit date: 2016   Substance and Sexual Activity    Alcohol use: No     Alcohol/week: 0.0 oz     Comment: quit 30 y- prior alcoholism    Drug use: Yes     Comment: methadone     Sexual activity: Not on file   Lifestyle    Physical activity:     Days per week: Not on file     Minutes per session: Not on file    Stress: Not on file   Relationships     Social connections:     Talks on phone: Not on file     Gets together: Not on file     Attends Caodaism service: Not on file     Active member of club or organization: Not on file     Attends meetings of clubs or organizations: Not on file     Relationship status: Not on file   Other Topics Concern    Not on file   Social History Narrative    Not on file     Current Outpatient Medications on File Prior to Visit   Medication Sig Dispense Refill    albuterol (PROVENTIL/VENTOLIN HFA) 90 mcg/actuation inhaler Inhale 2 puffs into the lungs every 6 (six) hours as needed for Wheezing or Shortness of Breath. 18 g 5    aspirin (ECOTRIN) 81 MG EC tablet Take 81 mg by mouth once daily.      atorvastatin (LIPITOR) 20 MG tablet Take 1 tablet (20 mg total) by mouth once daily. 90 tablet 2    benazepril (LOTENSIN) 40 MG tablet Take 40 mg by mouth once daily. One tablet nightly ([facility] supplied)      budesonide-formoterol 80-4.5 mcg (SYMBICORT) 80-4.5 mcg/actuation HFAA Inhale 2 puffs into the lungs 2 (two) times daily. Controller       buPROPion (WELLBUTRIN SR) 150 MG TBSR 12 hr tablet Take 150 mg by mouth 2 (two) times daily.       carvedilol (COREG) 25 MG tablet Take 25 mg by mouth 2 (two) times daily with meals. Patient taking 1/2 tablet twice daily ([facility] supplied)      clonazePAM (KLONOPIN) 1 MG tablet Take 1 tablet (1 mg total) by mouth 2 (two) times daily. (Patient taking differently: Take 1 mg by mouth as needed. ) 60 tablet 1    coenzyme Q10 (CO Q-10) 100 mg capsule Take 100 mg by mouth once daily.      LACTOBAC NO.41/BIFIDOBACT NO.7 (PROBIOTIC-10 ORAL) Take 10 mg by mouth once daily.      levothyroxine (SYNTHROID) 25 MCG tablet Take 25 mcg by mouth.      methadone (DOLOPHINE) 10 mg/5 mL solution Take 55 mg by mouth once daily.      nebulizer accessories Kit by Miscellaneous route. Use as directed      omeprazole (PRILOSEC) 40 MG capsule Take 1 capsule (40 mg total) by mouth once daily. 90 capsule 1     ranitidine (ZANTAC) 150 MG capsule Take 75 mg by mouth every 24 hours as needed for Heartburn.       tamsulosin (FLOMAX) 0.4 mg Cap TAKE ONE CAPSULE BY MOUTH EVERY DAY AFTER BREAKFAST  1    tiotropium (SPIRIVA) 18 mcg inhalation capsule Inhale 18 mcg into the lungs.      zolpidem (AMBIEN) 10 mg Tab Take 10 mg by mouth nightly as needed.      [DISCONTINUED] cefUROXime (CEFTIN) 250 MG tablet Take 250 mg by mouth 2 (two) times daily.      [DISCONTINUED] guaiFENesin (HUMIBID E) 400 mg Tab Take 400 mg by mouth.      [DISCONTINUED] oxyCODONE-acetaminophen (PERCOCET)  mg per tablet Take 1 tablet by mouth every 4 (four) hours as needed for Pain. 28 tablet 0     No current facility-administered medications on file prior to visit.      Review of patient's allergies indicates:   Allergen Reactions    Acetaminophen Other (See Comments)     H/o HEP C (PT DOES NOT WANT)       Objective:     Physical Exam   Constitutional: He is oriented to person, place, and time. He appears well-developed and well-nourished.   HENT:   Head: Normocephalic and atraumatic.   Eyes: Pupils are equal, round, and reactive to light. Conjunctivae are normal.   Neck: Normal range of motion. Neck supple.   Cardiovascular: Normal rate, regular rhythm, normal heart sounds and intact distal pulses.   Pulmonary/Chest: Effort normal and breath sounds normal.   Abdominal: Soft. Bowel sounds are normal.   Neurological: He is alert and oriented to person, place, and time.   Skin: Skin is warm and dry.   Psychiatric: He has a normal mood and affect.   Nursing note and vitals reviewed.      Assessment:     1. Coronary artery disease involving native coronary artery of native heart without angina pectoris    2. Hx of CABG    3. S/P femoral-popliteal bypass surgery    4. Hyperlipidemia with target LDL less than 100    5. Peripheral arterial disease    6. Hypertriglyceridemia        Plan:     Coronary artery disease involving native coronary artery of  native heart without angina pectoris    Hx of CABG    S/P femoral-popliteal bypass surgery    Hyperlipidemia with target LDL less than 100    Peripheral arterial disease    Hypertriglyceridemia    vascular studies  Continue statin-hlp  Continue coreg, benazapril-htn

## 2019-07-06 ENCOUNTER — OFFICE VISIT (OUTPATIENT)
Dept: FAMILY MEDICINE | Facility: CLINIC | Age: 69
End: 2019-07-06
Payer: MEDICARE

## 2019-07-06 VITALS
BODY MASS INDEX: 23.32 KG/M2 | SYSTOLIC BLOOD PRESSURE: 132 MMHG | HEART RATE: 84 BPM | WEIGHT: 172.19 LBS | TEMPERATURE: 98 F | HEIGHT: 72 IN | DIASTOLIC BLOOD PRESSURE: 78 MMHG

## 2019-07-06 DIAGNOSIS — M70.21 OLECRANON BURSITIS OF RIGHT ELBOW: Primary | ICD-10-CM

## 2019-07-06 DIAGNOSIS — F11.20 OPIOID DEPENDENCE IN CONTROLLED ENVIRONMENT: ICD-10-CM

## 2019-07-06 PROCEDURE — 99213 OFFICE O/P EST LOW 20 MIN: CPT | Mod: S$PBB,,, | Performed by: FAMILY MEDICINE

## 2019-07-06 PROCEDURE — 99215 OFFICE O/P EST HI 40 MIN: CPT | Mod: PBBFAC,PO | Performed by: FAMILY MEDICINE

## 2019-07-06 PROCEDURE — 99213 PR OFFICE/OUTPT VISIT, EST, LEVL III, 20-29 MIN: ICD-10-PCS | Mod: S$PBB,,, | Performed by: FAMILY MEDICINE

## 2019-07-06 PROCEDURE — 99999 PR PBB SHADOW E&M-EST. PATIENT-LVL V: CPT | Mod: PBBFAC,,, | Performed by: FAMILY MEDICINE

## 2019-07-06 PROCEDURE — 99999 PR PBB SHADOW E&M-EST. PATIENT-LVL V: ICD-10-PCS | Mod: PBBFAC,,, | Performed by: FAMILY MEDICINE

## 2019-07-06 RX ORDER — MELOXICAM 7.5 MG/1
7.5 TABLET ORAL DAILY
Qty: 30 TABLET | Refills: 1 | Status: SHIPPED | OUTPATIENT
Start: 2019-07-06 | End: 2019-07-22 | Stop reason: SDUPTHER

## 2019-07-06 NOTE — PROGRESS NOTES
Complains of some swelling at the posterior right elbow for approximately the past 2 weeks.   was using a sledgehammer breaking up bricks the week prior to this.  No obvious injury.  Minimal discomfort.  No fever chills or drainage. Current treatment has included over-the-counter Aleve without resolution.    Ilana was seen today for joint swelling.    Diagnoses and all orders for this visit:    Olecranon bursitis of right elbow    Other orders  -     meloxicam (MOBIC) 7.5 MG tablet; Take 1 tablet (7.5 mg total) by mouth once daily.  -     arm brace (ACE ELBOW BRACE) Misc; Elbow sleeve     Discontinue Aleve.  Handout given regarding bursitis.  Follow-up as needed if symptoms worsen or not getting better in the next 6 weeks.  He did request pain medication in case it flares up.  I advised that he was not a candidate as he is on methadone for chronic opioid dependence              Past Medical History:  Past Medical History:   Diagnosis Date    Alcoholism in remission     Anxiety     CAD (coronary artery disease) 9/2/2016    Chronic back pain     pain mgt    Chronic hepatitis C without hepatic coma 1/8/2016    Colon polyps     COPD (chronic obstructive pulmonary disease)     Depressed left ventricular systolic function 9/6/2016    Depression     Diverticulosis     DJD (degenerative joint disease) of hip     Emphysema of lung     Esophageal ulcer with bleeding     GERD (gastroesophageal reflux disease)     Hepatitis C     completed Interferon therapy    Hiatal hernia     Hyperlipidemia with target LDL less than 100 1/7/2015    Hypertension     Hypertriglyceridemia     Opioid dependence in controlled environment 4/16/2018    Osteomyelitis of right foot     Peripheral arterial disease     PTSD (post-traumatic stress disorder)     Spells     last Sept 2013, not sure if it was TIA v seizure    Squamous cell skin cancer     Testosterone deficiency     Tubular adenoma of colon      Past  Surgical History:   Procedure Laterality Date    AORTOGRAM, WITH RUNOFF N/A 2/6/2019    [facility]    CARDIAC CATHETERIZATION      COLONOSCOPY  2008    COLONOSCOPY N/A 1/8/2016    Performed by Amauri Delatorre MD at [facility] ENDO    CORONARY ARTERY BYPASS GRAFT  09/09/2016    DEBRIDEMENT-FOOT Right 3/21/2014    Per[facility] OR    EXCISION, BONE SPUR, FOOT Right 12/6/2013    Performed [facility]os [facility] at [facility] OR    fem pop bypass-left Left 03/2019    FOOT SURGERY Right 12/6/2013    fifth metatarsal head resection [Other]    ORIF WRIST FRACTURE      LT    REVISION ENTHESIS Right 12/6/2013    Performed [facility]os D[facility] at [facility] OR    SHOULDER SURGERY      left    TONSILLECTOMY      UPPER GASTROINTESTINAL ENDOSCOPY  7/7/2016, 2010     Review of patient's allergies indicates:   Allergen Reactions    Acetaminophen Other (See Comments)     H/o HEP C (PT DOES NOT WANT)     Current Outpatient Medications on File Prior to Visit   Medication Sig Dispense Refill    albuterol (PROVENTIL/VENTOLIN HFA) 90 mcg/actuation inhaler Inhale 2 puffs into the lungs every 6 (six) hours as needed for Wheezing or Shortness of Breath. 18 g 5    aspirin (ECOTRIN) 81 MG EC tablet Take 81 mg by mouth once daily.      atorvastatin (LIPITOR) 20 MG tablet Take 1 tablet (20 mg total) by mouth once daily. 90 tablet 2    benazepril (LOTENSIN) 40 MG tablet Take 20 mg by mouth once daily. One tablet nightly ([facility] supplied)       budesonide-formoterol 80-4.5 mcg (SYMBICORT) 80-4.5 mcg/actuation HFAA Inhale 2 puffs into the lungs 2 (two) times daily. Controller       buPROPion (WELLBUTRIN SR) 150 MG TBSR 12 hr tablet Take 150 mg by mouth 2 (two) times daily.       carvedilol (COREG) 25 MG tablet Take 25 mg by mouth 2 (two) times daily with meals. Patient taking 1/2 tablet twice daily ([facility] supplied)      clonazePAM (KLONOPIN) 1 MG tablet Take 1 tablet  (1 mg total) by mouth 2 (two) times daily. (Patient taking differently: Take 1 mg by mouth as needed. ) 60 tablet 1    coenzyme Q10 (CO Q-10) 100 mg capsule Take 100 mg by mouth once daily.      LACTOBAC NO.41/BIFIDOBACT NO.7 (PROBIOTIC-10 ORAL) Take 10 mg by mouth once daily.      levothyroxine (SYNTHROID) 25 MCG tablet Take 25 mcg by mouth.      methadone (DOLOPHINE) 10 mg/5 mL solution Take 55 mg by mouth once daily.      nebulizer accessories Kit by Miscellaneous route. Use as directed      omeprazole (PRILOSEC) 40 MG capsule Take 1 capsule (40 mg total) by mouth once daily. 90 capsule 1    ranitidine (ZANTAC) 150 MG capsule Take 75 mg by mouth every 24 hours as needed for Heartburn.       tamsulosin (FLOMAX) 0.4 mg Cap TAKE ONE CAPSULE BY MOUTH EVERY DAY AFTER BREAKFAST  1    tiotropium (SPIRIVA) 18 mcg inhalation capsule Inhale 18 mcg into the lungs.      zolpidem (AMBIEN) 10 mg Tab Take 10 mg by mouth nightly as needed.       No current facility-administered medications on file prior to visit.      Social History     Socioeconomic History    Marital status:      Spouse name: Not on file    Number of children: Not on file    Years of education: Not on file    Highest education level: Not on file   Occupational History    Not on file   Social Needs    Financial resource strain: Not on file    Food insecurity:     Worry: Not on file     Inability: Not on file    Transportation needs:     Medical: Not on file     Non-medical: Not on file   Tobacco Use    Smoking status: Former Smoker     Packs/day: 1.50     Years: 40.00     Pack years: 60.00     Types: Cigarettes, Cigars     Last attempt to quit: 2016     Years since quittin.8    Smokeless tobacco: Former User     Quit date: 2016   Substance and Sexual Activity    Alcohol use: No     Alcohol/week: 0.0 oz     Comment: quit 30 y- prior alcoholism    Drug use: Yes     Comment: methadone     Sexual activity: Not on file    Lifestyle    Physical activity:     Days per week: Not on file     Minutes per session: Not on file    Stress: Not on file   Relationships    Social connections:     Talks on phone: Not on file     Gets together: Not on file     Attends Quaker service: Not on file     Active member of club or organization: Not on file     Attends meetings of clubs or organizations: Not on file     Relationship status: Not on file   Other Topics Concern    Not on file   Social History Narrative    Not on file     Family History   Problem Relation Age of Onset    Lung cancer Mother     Hypertension Mother     Hypertension Father     Arrhythmia Brother 60    Heart disease Daughter     Heart attacks under age 50 Daughter     Colon cancer Neg Hx     Stomach cancer Neg Hx            ROS:  GENERAL: No fever, chills,  or significant weight changes.   CARDIOVASCULAR: Denies chest pain, PND, orthopnea or reduced exercise tolerance.  ABDOMEN: Appetite fine. Denies diarrhea, abdominal pain, hematemesis or blood in stool.  URINARY: No flank pain, dysuria or hematuria.    Vitals:    07/06/19 1025   BP: 132/78   Pulse: 84   Temp: 98.2 °F (36.8 °C)   TempSrc: Oral   Weight: 78.1 kg (172 lb 2.9 oz)   Height: 6' (1.829 m)       Wt Readings from Last 3 Encounters:   07/06/19 78.1 kg (172 lb 2.9 oz)   06/19/19 78 kg (172 lb 1.1 oz)   04/25/19 81.7 kg (180 lb 1.9 oz)       APPEARANCE: Well nourished, well developed, in no acute distress.    MENTAL STATUS: Alert.  Oriented x 3.  The right elbow has full range of motion.  There is swelling of the olecranon bursa.  It is non tense.  There is no erythema.  No tenderness.

## 2019-07-06 NOTE — PATIENT INSTRUCTIONS
Bursitis of the Elbow (Olecranon)  Your elbow joint contains a small fluid-filled sac called a bursa. The bursa helps the muscles and tendons move smoothly over the bone. It also cushions and protects your elbow. Bursitis is when the bursa is inflamed or swollen. This is most often due to overuse of or injury to the elbow. Symptoms include swelling and pain. If the elbow is red and feels warm to the touch, the bursa itself may be infected.  In most cases, elbow bursitis resolves with medicine and self-care at home. It may take several weeks for the bursa to heal and the swelling to go away. In some cases, your healthcare provider may drain excess fluid from the bursa. Or, he or she may inject medicine directly into the bursa to help relieve symptoms. In severe cases, you may need surgery to remove the bursa may. If there is concern that the bursa is infected, your healthcare provider may prescribe antibiotics to treat the infection.    Home care  Your healthcare provider may prescribe medicine to help relieve pain and swelling. This may be an over-the-counter pain reliever or prescription pain medicine. Take all medicines as directed. To help treat or prevent infection, your provider may prescribe antibiotics. If these are prescribed, take them as directed until they are gone.  The following are general care guidelines:  · Apply an ice pack or bag of frozen peas wrapped in a thin towel to your elbow for 15 to 20 minutes at a time. Do this 3 to 4 times a day until pain and swelling improve.  · Keep your elbow raised above the level of your heart whenever possible. This helps reduce swelling. When sitting or lying down, place your arm on a pillow that rests on your chest or on a pillow at your side.  · Use an elastic wrap around the elbow joint to compress the area while it is healing. Make the wrap snug but not tight to the point of causing pain.  · Rest your elbow to give it time to heal. You may need to wear an  elbow pad to help protect and limit the movement of your elbow. During and after healing, avoid leaning on your elbows.  Follow-up care  Follow up with your healthcare provider, or as advised. If you have been referred to a specialist, make that appointment promptly.  When to seek medical advice  Call your healthcare provider right away if any of these occur:  · Fever of 100.4°F (38°C) or higher, or as advised  · Chills  · Increased pain, swelling, warmth, redness, or drainage from the joint  · Trouble moving the elbow joint  · Numbness or tingling in the hand  · Severe pain or swelling in forearm or hand  · Loss of pink color and slow return of color after squeezing fingertip or hand  Date Last Reviewed: 6/1/2016  © 4429-3321 [employer] [address] [place] [address], [place] [address]. All rights reserved. This information is not intended as a substitute for professional medical care. Always follow your healthcare professional's instructions.

## 2019-07-09 ENCOUNTER — CLINICAL SUPPORT (OUTPATIENT)
Dept: CARDIOLOGY | Facility: CLINIC | Age: 69
End: 2019-07-09
Attending: INTERNAL MEDICINE
Payer: MEDICARE

## 2019-07-09 DIAGNOSIS — I73.9 PERIPHERAL ARTERIAL DISEASE: ICD-10-CM

## 2019-07-09 LAB — VASCULAR ANKLE BRACHIAL INDEX (ABI) LEFT: 1.21 (ref 0.9–1.2)

## 2019-07-09 PROCEDURE — 93925 CAR US DOPPLER ARTERIAL LEGS BILATERAL: ICD-10-PCS | Mod: 26,S$PBB,, | Performed by: INTERNAL MEDICINE

## 2019-07-09 PROCEDURE — 93925 LOWER EXTREMITY STUDY: CPT | Mod: PBBFAC,PO | Performed by: INTERNAL MEDICINE

## 2019-07-09 PROCEDURE — 93922 UPR/L XTREMITY ART 2 LEVELS: CPT | Mod: PBBFAC,PO | Performed by: INTERNAL MEDICINE

## 2019-07-09 PROCEDURE — 93922 CAR US ANKLE BRACHIAL INDICES EXT LTD WO STR: ICD-10-PCS | Mod: 26,S$PBB,, | Performed by: INTERNAL MEDICINE

## 2019-07-10 ENCOUNTER — TELEPHONE (OUTPATIENT)
Dept: CARDIOLOGY | Facility: CLINIC | Age: 69
End: 2019-07-10

## 2019-07-10 NOTE — TELEPHONE ENCOUNTER
Called and left v/m leg study normal, call if any questions. Cm  ----- Message from Gil House MD sent at 7/10/2019  7:39 AM CDT -----  GAYATHRI is nml

## 2019-07-12 ENCOUNTER — TELEPHONE (OUTPATIENT)
Dept: CARDIOLOGY | Facility: CLINIC | Age: 69
End: 2019-07-12

## 2019-07-12 NOTE — TELEPHONE ENCOUNTER
----- Message from Gil House MD sent at 7/12/2019  2:40 PM CDT -----  Left bypass is open, moderate disease RLA, recommend walking 30 minutes 5 days per week

## 2019-07-12 NOTE — TELEPHONE ENCOUNTER
Pt notified. Cm    ----- Message from Gil House MD sent at 7/12/2019  2:40 PM CDT -----  Left bypass is open, moderate disease RLA, recommend walking 30 minutes 5 days per week

## 2019-07-22 RX ORDER — MELOXICAM 7.5 MG/1
7.5 TABLET ORAL DAILY
Qty: 30 TABLET | Refills: 5 | Status: SHIPPED | OUTPATIENT
Start: 2019-07-22 | End: 2020-07-21

## 2019-07-29 ENCOUNTER — OFFICE VISIT (OUTPATIENT)
Dept: FAMILY MEDICINE | Facility: CLINIC | Age: 69
End: 2019-07-29
Payer: MEDICARE

## 2019-07-29 VITALS
SYSTOLIC BLOOD PRESSURE: 146 MMHG | OXYGEN SATURATION: 86 % | DIASTOLIC BLOOD PRESSURE: 75 MMHG | HEART RATE: 78 BPM | BODY MASS INDEX: 23.03 KG/M2 | HEIGHT: 72 IN | TEMPERATURE: 99 F | WEIGHT: 170 LBS

## 2019-07-29 DIAGNOSIS — R41.0 DISORIENTED: ICD-10-CM

## 2019-07-29 DIAGNOSIS — T17.908D ASPIRATION INTO AIRWAY, SUBSEQUENT ENCOUNTER: ICD-10-CM

## 2019-07-29 DIAGNOSIS — R06.02 SOB (SHORTNESS OF BREATH): Primary | ICD-10-CM

## 2019-07-29 DIAGNOSIS — R50.9 FEVER, UNSPECIFIED FEVER CAUSE: ICD-10-CM

## 2019-07-29 PROCEDURE — 99215 OFFICE O/P EST HI 40 MIN: CPT | Mod: PBBFAC,PO | Performed by: NURSE PRACTITIONER

## 2019-07-29 PROCEDURE — 99999 PR PBB SHADOW E&M-EST. PATIENT-LVL V: ICD-10-PCS | Mod: PBBFAC,,, | Performed by: NURSE PRACTITIONER

## 2019-07-29 PROCEDURE — 99499 UNLISTED E&M SERVICE: CPT | Mod: S$PBB,,, | Performed by: NURSE PRACTITIONER

## 2019-07-29 PROCEDURE — 99499 NO LOS: ICD-10-PCS | Mod: S$PBB,,, | Performed by: NURSE PRACTITIONER

## 2019-07-29 PROCEDURE — 99999 PR PBB SHADOW E&M-EST. PATIENT-LVL V: CPT | Mod: PBBFAC,,, | Performed by: NURSE PRACTITIONER

## 2019-07-29 NOTE — PROGRESS NOTES
Subjective:       Patient ID: Ilana Carr is a 69 y.o. male.    Chief Complaint: Shortness of Breath and Fever    Shortness of Breath   This is a new problem. The current episode started in the past 7 days. The problem occurs constantly. The problem has been gradually worsening. Associated symptoms include chest pain (resolved) and a fever (100.5-101 prior to visit). Pertinent negatives include no abdominal pain, claudication, coryza, ear pain, headaches, hemoptysis, leg pain, leg swelling, neck pain, orthopnea, PND, rash, rhinorrhea, sore throat, sputum production, swollen glands, syncope, vomiting or wheezing. Nothing aggravates the symptoms. Associated symptoms comments: Wife states pt aspirated 2 d ago; disoriented this morning prior to office visit. The patient has no known risk factors for DVT/PE. He has tried nothing for the symptoms. The treatment provided no relief. His past medical history is significant for CAD, COPD and pneumonia. There is no history of allergies, aspirin allergies, asthma, bronchiolitis, chronic lung disease, DVT, a heart failure, PE or a recent surgery. (History of CABG)   Caregiver and patient informed could do work-up in clinic, however if aspiration pneumonia, cardiac abnromality noted, would recommend ER; states will report to ER now instead.   Past Medical History:   Diagnosis Date    Alcoholism in remission     Anxiety     CAD (coronary artery disease) 9/2/2016    Chronic back pain     pain mgt    Chronic hepatitis C without hepatic coma 1/8/2016    Colon polyps     COPD (chronic obstructive pulmonary disease)     Depressed left ventricular systolic function 9/6/2016    Depression     Diverticulosis     DJD (degenerative joint disease) of hip     Emphysema of lung     Esophageal ulcer with bleeding     GERD (gastroesophageal reflux disease)     Hepatitis C     completed Interferon therapy    Hiatal hernia     Hyperlipidemia with target LDL less than 100  2015    Hypertension     Hypertriglyceridemia     Opioid dependence in controlled environment 2018    Osteomyelitis of right foot     Peripheral arterial disease     PTSD (post-traumatic stress disorder)     Spells     last 2013, not sure if it was TIA v seizure    Squamous cell skin cancer     Testosterone deficiency     Tubular adenoma of colon      Social History     Socioeconomic History    Marital status:      Spouse name: Not on file    Number of children: Not on file    Years of education: Not on file    Highest education level: Not on file   Occupational History    Not on file   Social Needs    Financial resource strain: Not on file    Food insecurity:     Worry: Not on file     Inability: Not on file    Transportation needs:     Medical: Not on file     Non-medical: Not on file   Tobacco Use    Smoking status: Former Smoker     Packs/day: 1.50     Years: 40.00     Pack years: 60.00     Types: Cigarettes, Cigars     Last attempt to quit: 2016     Years since quittin.8    Smokeless tobacco: Former User     Quit date: 2016   Substance and Sexual Activity    Alcohol use: No     Alcohol/week: 0.0 oz     Comment: quit 30 y- prior alcoholism    Drug use: Yes     Comment: methadone     Sexual activity: Not on file   Lifestyle    Physical activity:     Days per week: Not on file     Minutes per session: Not on file    Stress: Not on file   Relationships    Social connections:     Talks on phone: Not on file     Gets together: Not on file     Attends Quaker service: Not on file     Active member of club or organization: Not on file     Attends meetings of clubs or organizations: Not on file     Relationship status: Not on file   Other Topics Concern    Not on file   Social History Narrative    Not on file     Past Surgical History:   Procedure Laterality Date    AORTOGRAM, WITH RUNOFF N/A 2019    [facility] at [facility] C[facility] LAB     CARDIAC CATHETERIZATION      COLONOSCOPY  2008    COLONOSCOPY N/A 1/8/2016    Per[facility] by [facility] at [facility] ENDO    CORONARY ARTERY BYPASS GRAFT  09/09/2016    DEBRIDEMENT-FOOT Right 3/21/2014    [facility] at [facility]    EXCISION, BONE SPUR, FOOT Right 12/6/2013    Performed by [facility] at [facility] OR    fem pop bypass-left Left 03/2019    FOOT SURGERY Right 12/6/2013    fifth metatarsal head resection [Other]    ORIF WRIST FRACTURE      LT    REVISION ENTHESIS Right 12/6/2013    Performed by Pipo [facility]iegos DPM at [facility] OR    SHOULDER SURGERY      left    TONSILLECTOMY      UPPER GASTROINTESTINAL ENDOSCOPY  7/7/2016, 2010       Review of Systems   Constitutional: Positive for fever (100.5-101 prior to visit).   HENT: Negative.  Negative for ear pain, rhinorrhea and sore throat.    Eyes: Negative.    Respiratory: Positive for shortness of breath. Negative for hemoptysis, sputum production and wheezing.    Cardiovascular: Positive for chest pain (resolved). Negative for orthopnea, claudication, leg swelling, syncope and PND.   Gastrointestinal: Negative.  Negative for abdominal pain and vomiting.   Endocrine: Negative.    Genitourinary: Negative.    Musculoskeletal: Negative.  Negative for neck pain.   Skin: Negative.  Negative for rash.   Allergic/Immunologic: Negative.    Neurological: Negative.  Negative for headaches.        Disorientation   Psychiatric/Behavioral: Negative.        Objective:      Physical Exam   Constitutional: He appears well-developed and well-nourished.   HENT:   Head: Normocephalic.   Right Ear: External ear normal.   Left Ear: External ear normal.   Nose: Nose normal.   Mouth/Throat: Oropharynx is clear and moist.   Eyes: Pupils are equal, round, and reactive to light. Conjunctivae are normal.   Neck: Normal range of motion. Neck supple.   Cardiovascular: Normal rate, regular rhythm and normal heart sounds.   Pulmonary/Chest:  Effort normal.   Abdominal: Soft. Bowel sounds are normal.   Musculoskeletal: Normal range of motion.   Neurological: He is alert.   Skin: Skin is warm and dry. Capillary refill takes 2 to 3 seconds.   Psychiatric: He has a normal mood and affect. His behavior is normal. Judgment and thought content normal. His speech is delayed.   Nursing note and vitals reviewed.      Assessment:       1. SOB (shortness of breath)    2. Disoriented    3. Aspiration into airway, subsequent encounter    4. Fever, unspecified fever cause        Plan:           Ilana was seen today for shortness of breath and fever.    Diagnoses and all orders for this visit:    SOB (shortness of breath)  Disoriented  Comments:  Prior to office visit  Aspiration into airway, subsequent encounter  Comments:  Possible  Fever, unspecified fever cause  Advised to report to ER; states reporting to [facility] ER

## 2019-07-31 DIAGNOSIS — I73.9 PERIPHERAL VASCULAR DISEASE, UNSPECIFIED: Primary | ICD-10-CM

## 2019-10-23 ENCOUNTER — OFFICE VISIT (OUTPATIENT)
Dept: CARDIOLOGY | Facility: CLINIC | Age: 69
End: 2019-10-23
Payer: MEDICARE

## 2019-10-23 VITALS
HEART RATE: 64 BPM | BODY MASS INDEX: 24.1 KG/M2 | WEIGHT: 177.94 LBS | SYSTOLIC BLOOD PRESSURE: 176 MMHG | DIASTOLIC BLOOD PRESSURE: 84 MMHG | HEIGHT: 72 IN

## 2019-10-23 DIAGNOSIS — J44.9 MODERATE COPD (CHRONIC OBSTRUCTIVE PULMONARY DISEASE): Primary | ICD-10-CM

## 2019-10-23 DIAGNOSIS — Z95.1 HX OF CABG: ICD-10-CM

## 2019-10-23 DIAGNOSIS — I25.10 CORONARY ARTERY DISEASE INVOLVING NATIVE CORONARY ARTERY OF NATIVE HEART WITHOUT ANGINA PECTORIS: ICD-10-CM

## 2019-10-23 DIAGNOSIS — I10 ESSENTIAL HYPERTENSION: ICD-10-CM

## 2019-10-23 DIAGNOSIS — E78.1 HYPERTRIGLYCERIDEMIA: ICD-10-CM

## 2019-10-23 DIAGNOSIS — Z95.828 S/P FEMORAL-POPLITEAL BYPASS SURGERY: Chronic | ICD-10-CM

## 2019-10-23 DIAGNOSIS — Z87.891 EX-SMOKER: ICD-10-CM

## 2019-10-23 PROCEDURE — 99214 PR OFFICE/OUTPT VISIT, EST, LEVL IV, 30-39 MIN: ICD-10-PCS | Mod: S$PBB,,, | Performed by: INTERNAL MEDICINE

## 2019-10-23 PROCEDURE — 99214 OFFICE O/P EST MOD 30 MIN: CPT | Mod: S$PBB,,, | Performed by: INTERNAL MEDICINE

## 2019-10-23 PROCEDURE — 99999 PR PBB SHADOW E&M-EST. PATIENT-LVL II: ICD-10-PCS | Mod: PBBFAC,,, | Performed by: INTERNAL MEDICINE

## 2019-10-23 PROCEDURE — 99999 PR PBB SHADOW E&M-EST. PATIENT-LVL II: CPT | Mod: PBBFAC,,, | Performed by: INTERNAL MEDICINE

## 2019-10-23 PROCEDURE — 99212 OFFICE O/P EST SF 10 MIN: CPT | Mod: PBBFAC,PO | Performed by: INTERNAL MEDICINE

## 2019-10-23 RX ORDER — TRAZODONE HYDROCHLORIDE 50 MG/1
50 TABLET ORAL NIGHTLY
COMMUNITY

## 2019-10-23 NOTE — PROGRESS NOTES
Subjective:   Patient ID:  Ilana Carr is a 69 y.o. male who presents for follow-up of Follow-up (hard time catching my breath)  Vascular studies show patent L bypass. Patient denies CP, angina or anginal equivalent.    Hypertension   This is a chronic problem. The current episode started more than 1 year ago. The problem has been gradually improving since onset. The problem is controlled. Pertinent negatives include no chest pain, palpitations or shortness of breath. Past treatments include beta blockers and ACE inhibitors. The current treatment provides moderate improvement. There are no compliance problems.    Hyperlipidemia   This is a chronic problem. The current episode started more than 1 year ago. The problem is controlled. Pertinent negatives include no chest pain or shortness of breath. Current antihyperlipidemic treatment includes statins. The current treatment provides moderate improvement of lipids.   Coronary Artery Disease   Presents for follow-up visit. Pertinent negatives include no chest pain, chest pressure, chest tightness, dizziness, leg swelling, muscle weakness, palpitations, shortness of breath or weight gain. Risk factors include hyperlipidemia. The symptoms have been stable. Compliance with diet is variable. Compliance with exercise is variable. Compliance with medications is good.       Review of Systems   Constitution: Negative. Negative for weight gain.   HENT: Negative.    Eyes: Negative.    Cardiovascular: Negative.  Negative for chest pain, leg swelling and palpitations.   Respiratory: Negative.  Negative for chest tightness and shortness of breath.    Endocrine: Negative.    Hematologic/Lymphatic: Negative.    Skin: Negative.    Musculoskeletal: Negative for muscle weakness.   Gastrointestinal: Negative.    Genitourinary: Negative.    Neurological: Negative.  Negative for dizziness.   Psychiatric/Behavioral: Negative.    Allergic/Immunologic: Negative.      Family History   Problem  Relation Age of Onset    Lung cancer Mother     Hypertension Mother     Hypertension Father     Arrhythmia Brother 60    Heart disease Daughter     Heart attacks under age 50 Daughter     Colon cancer Neg Hx     Stomach cancer Neg Hx      Past Medical History:   Diagnosis Date    Alcoholism in remission     Anxiety     CAD (coronary artery disease) 9/2/2016    Chronic back pain     pain mgt    Chronic hepatitis C without hepatic coma 1/8/2016    Colon polyps     COPD (chronic obstructive pulmonary disease)     Depressed left ventricular systolic function 9/6/2016    Depression     Diverticulosis     DJD (degenerative joint disease) of hip     Emphysema of lung     Esophageal ulcer with bleeding     GERD (gastroesophageal reflux disease)     Hepatitis C     completed Interferon therapy    Hiatal hernia     Hyperlipidemia with target LDL less than 100 1/7/2015    Hypertension     Hypertriglyceridemia     Opioid dependence in controlled environment 4/16/2018    Osteomyelitis of right foot     Peripheral arterial disease     PTSD (post-traumatic stress disorder)     Spells     last Sept 2013, not sure if it was TIA v seizure    Squamous cell skin cancer     Testosterone deficiency     Tubular adenoma of colon      Social History     Socioeconomic History    Marital status:      Spouse name: Not on file    Number of children: Not on file    Years of education: Not on file    Highest education level: Not on file   Occupational History    Not on file   Social Needs    Financial resource strain: Not on file    Food insecurity:     Worry: Not on file     Inability: Not on file    Transportation needs:     Medical: Not on file     Non-medical: Not on file   Tobacco Use    Smoking status: Former Smoker     Packs/day: 1.50     Years: 40.00     Pack years: 60.00     Types: Cigarettes, Cigars     Last attempt to quit: 9/9/2016     Years since quitting: 3.1    Smokeless tobacco:  Former User     Quit date: 9/9/2016   Substance and Sexual Activity    Alcohol use: No     Alcohol/week: 0.0 standard drinks     Comment: quit 30 y- prior alcoholism    Drug use: Yes     Comment: methadone     Sexual activity: Not on file   Lifestyle    Physical activity:     Days per week: Not on file     Minutes per session: Not on file    Stress: Not on file   Relationships    Social connections:     Talks on phone: Not on file     Gets together: Not on file     Attends Rastafarian service: Not on file     Active member of club or organization: Not on file     Attends meetings of clubs or organizations: Not on file     Relationship status: Not on file   Other Topics Concern    Not on file   Social History Narrative    Not on file     Current Outpatient Medications on File Prior to Visit   Medication Sig Dispense Refill    albuterol (PROVENTIL/VENTOLIN HFA) 90 mcg/actuation inhaler Inhale 2 puffs into the lungs every 6 (six) hours as needed for Wheezing or Shortness of Breath. 18 g 5    arm brace (ACE ELBOW BRACE) Misc Elbow sleeve 1 each     aspirin (ECOTRIN) 81 MG EC tablet Take 81 mg by mouth once daily.      atorvastatin (LIPITOR) 20 MG tablet Take 1 tablet (20 mg total) by mouth once daily. 90 tablet 2    benazepril (LOTENSIN) 40 MG tablet Take 20 mg by mouth once daily. One tablet nightly ([facility])       budesonide-formoterol 80-4.5 mcg (SYMBICORT) 80-4.5 mcg/actuation HFAA Inhale 2 puffs into the lungs 2 (two) times daily. Controller       buPROPion (WELLBUTRIN SR) 150 MG TBSR 12 hr tablet Take 150 mg by mouth once daily.       carvedilol (COREG) 25 MG tablet Take 25 mg by mouth 2 (two) times daily with meals. Patient taking 1/2 tablet twice daily ([facility])      clonazePAM (KLONOPIN) 1 MG tablet Take 1 tablet (1 mg total) by mouth 2 (two) times daily. (Patient taking differently: Take 1 mg by mouth as needed. ) 60 tablet 1    coenzyme Q10 (CO Q-10) 100 mg capsule Take 100 mg by mouth  once daily.      levothyroxine (SYNTHROID) 25 MCG tablet Take 25 mcg by mouth.      meloxicam (MOBIC) 7.5 MG tablet Take 1 tablet (7.5 mg total) by mouth once daily. 30 tablet 5    methadone (DOLOPHINE) 10 mg/5 mL solution Take 35 mg by mouth once daily.       nebulizer accessories Kit by Miscellaneous route. Use as directed      omeprazole (PRILOSEC) 40 MG capsule Take 1 capsule (40 mg total) by mouth once daily. (Patient taking differently: Take 40 mg by mouth 2 (two) times daily before meals. ) 90 capsule 1    tamsulosin (FLOMAX) 0.4 mg Cap TAKE ONE CAPSULE BY MOUTH EVERY DAY AFTER BREAKFAST  1    tiotropium (SPIRIVA) 18 mcg inhalation capsule Inhale 18 mcg into the lungs.      traZODone (DESYREL) 50 MG tablet Take 50 mg by mouth every evening.      LACTOBAC NO.41/BIFIDOBACT NO.7 (PROBIOTIC-10 ORAL) Take 10 mg by mouth once daily.      ranitidine (ZANTAC) 150 MG capsule Take 75 mg by mouth every 24 hours as needed for Heartburn.       zolpidem (AMBIEN) 10 mg Tab Take 10 mg by mouth nightly as needed.       No current facility-administered medications on file prior to visit.      Review of patient's allergies indicates:   Allergen Reactions    Acetaminophen Other (See Comments)     H/o HEP C (PT DOES NOT WANT)       Objective:     Physical Exam   Constitutional: He is oriented to person, place, and time. He appears well-developed and well-nourished.   HENT:   Head: Normocephalic and atraumatic.   Eyes: Pupils are equal, round, and reactive to light. Conjunctivae are normal.   Neck: Normal range of motion. Neck supple.   Cardiovascular: Normal rate, regular rhythm, normal heart sounds and intact distal pulses.   Pulmonary/Chest: Effort normal and breath sounds normal.   Abdominal: Soft. Bowel sounds are normal.   Neurological: He is alert and oriented to person, place, and time.   Skin: Skin is warm and dry.   Psychiatric: He has a normal mood and affect.   Nursing note and vitals  reviewed.      Assessment:     1. Moderate COPD (chronic obstructive pulmonary disease)    2. Coronary artery disease involving native coronary artery of native heart without angina pectoris    3. Essential hypertension    4. Hypertriglyceridemia    5. Hx of CABG    6. S/P femoral-popliteal bypass surgery    7. Ex-smoker        Plan:     Moderate COPD (chronic obstructive pulmonary disease)    Coronary artery disease involving native coronary artery of native heart without angina pectoris    Essential hypertension    Hypertriglyceridemia    Hx of CABG    S/P femoral-popliteal bypass surgery    Ex-smoker        Continue statin-hlp  Continue coreg, benazapril-htn   continue asa- cad

## 2019-10-24 ENCOUNTER — TELEPHONE (OUTPATIENT)
Dept: CARDIOLOGY | Facility: CLINIC | Age: 69
End: 2019-10-24

## 2019-10-24 RX ORDER — FINASTERIDE 5 MG/1
5 TABLET, FILM COATED ORAL DAILY
COMMUNITY

## 2019-10-24 NOTE — TELEPHONE ENCOUNTER
Pt rtc and we reviewed med list. Need to add finastride 5 mg one a day. Told him I would. Cm  ----- Message from Sheeba Byers sent at 10/24/2019 10:02 AM CDT -----  Contact: pt  Type:  Needs Medical Advice    Who Called: TOMA MENDES   Symptoms (please be specific):   How long has patient had these symptoms:    Pharmacy name and phone #:    Would the patient rather a call back or a response via [facility]? call  Best Call Back Number:  [phone] (home)    Additional Information: pt is requesting a call back from the nurse in regards to the pt name of his new med and it levothyroxine 0.025 MG

## 2019-11-11 ENCOUNTER — PATIENT MESSAGE (OUTPATIENT)
Dept: CARDIOLOGY | Facility: CLINIC | Age: 69
End: 2019-11-11

## 2020-03-23 ENCOUNTER — PATIENT MESSAGE (OUTPATIENT)
Dept: CARDIOLOGY | Facility: HOSPITAL | Age: 70
End: 2020-03-23

## 2020-04-02 ENCOUNTER — TELEPHONE (OUTPATIENT)
Dept: CARDIOLOGY | Facility: CLINIC | Age: 70
End: 2020-04-02

## 2020-04-06 ENCOUNTER — TELEPHONE (OUTPATIENT)
Dept: CARDIOLOGY | Facility: CLINIC | Age: 70
End: 2020-04-06

## 2020-04-29 ENCOUNTER — TELEPHONE (OUTPATIENT)
Dept: CARDIOLOGY | Facility: CLINIC | Age: 70
End: 2020-04-29

## 2020-04-30 ENCOUNTER — TELEPHONE (OUTPATIENT)
Dept: CARDIOLOGY | Facility: CLINIC | Age: 70
End: 2020-04-30

## 2020-05-13 ENCOUNTER — TELEPHONE (OUTPATIENT)
Dept: CARDIOLOGY | Facility: CLINIC | Age: 70
End: 2020-05-13

## 2020-08-17 ENCOUNTER — HOSPITAL ENCOUNTER (OUTPATIENT)
Dept: CARDIOLOGY | Facility: HOSPITAL | Age: 70
Discharge: HOME | End: 2020-08-17
Attending: INTERNAL MEDICINE
Payer: MEDICARE

## 2020-08-17 VITALS
WEIGHT: 177 LBS | DIASTOLIC BLOOD PRESSURE: 84 MMHG | HEIGHT: 72 IN | SYSTOLIC BLOOD PRESSURE: 176 MMHG | BODY MASS INDEX: 23.98 KG/M2

## 2020-08-17 DIAGNOSIS — Z95.828 S/P FEMORAL-POPLITEAL BYPASS SURGERY: ICD-10-CM

## 2020-08-17 LAB
LEFT ABI: 0.64
LEFT ARM BP: 170 MMHG
LEFT DORSALIS PEDIS: 110 MMHG
LEFT POSTERIOR TIBIAL: 100 MMHG
LEFT TBI: 0.38
LEFT TOE PRESSURE: 66 MMHG
RIGHT ABI: 0.95
RIGHT ARM BP: 172 MMHG
RIGHT DORSALIS PEDIS: 157 MMHG
RIGHT POSTERIOR TIBIAL: 163 MMHG
RIGHT TBI: 0.49
RIGHT TOE PRESSURE: 84 MMHG

## 2020-08-17 PROCEDURE — 93922 CV US ANKLE BRACHIAL INDICES RESTING (CUPID ONLY): ICD-10-PCS | Mod: 26,,, | Performed by: INTERNAL MEDICINE

## 2020-08-17 PROCEDURE — 93922 UPR/L XTREMITY ART 2 LEVELS: CPT | Mod: 50,PO

## 2020-08-17 PROCEDURE — 93922 UPR/L XTREMITY ART 2 LEVELS: CPT | Mod: 26,,, | Performed by: INTERNAL MEDICINE

## 2020-08-20 ENCOUNTER — OFFICE VISIT (OUTPATIENT)
Dept: CARDIOLOGY | Facility: CLINIC | Age: 70
End: 2020-08-20
Payer: MEDICARE

## 2020-08-20 VITALS
HEART RATE: 69 BPM | BODY MASS INDEX: 22.81 KG/M2 | TEMPERATURE: 98 F | OXYGEN SATURATION: 92 % | DIASTOLIC BLOOD PRESSURE: 82 MMHG | WEIGHT: 168.38 LBS | SYSTOLIC BLOOD PRESSURE: 140 MMHG | HEIGHT: 72 IN

## 2020-08-20 DIAGNOSIS — I10 ESSENTIAL HYPERTENSION: ICD-10-CM

## 2020-08-20 DIAGNOSIS — Z87.891 EX-SMOKER: ICD-10-CM

## 2020-08-20 DIAGNOSIS — J44.9 MODERATE COPD (CHRONIC OBSTRUCTIVE PULMONARY DISEASE): ICD-10-CM

## 2020-08-20 DIAGNOSIS — I25.10 CORONARY ARTERY DISEASE INVOLVING NATIVE CORONARY ARTERY OF NATIVE HEART WITHOUT ANGINA PECTORIS: Primary | ICD-10-CM

## 2020-08-20 DIAGNOSIS — I73.9 PERIPHERAL ARTERIAL DISEASE: ICD-10-CM

## 2020-08-20 DIAGNOSIS — E78.49 OTHER HYPERLIPIDEMIA: Chronic | ICD-10-CM

## 2020-08-20 DIAGNOSIS — Z95.828 S/P FEMORAL-POPLITEAL BYPASS SURGERY: Chronic | ICD-10-CM

## 2020-08-20 DIAGNOSIS — Z95.1 HX OF CABG: ICD-10-CM

## 2020-08-20 PROCEDURE — 99214 OFFICE O/P EST MOD 30 MIN: CPT | Mod: S$PBB,,, | Performed by: NURSE PRACTITIONER

## 2020-08-20 PROCEDURE — 99214 PR OFFICE/OUTPT VISIT, EST, LEVL IV, 30-39 MIN: ICD-10-PCS | Mod: S$PBB,,, | Performed by: NURSE PRACTITIONER

## 2020-08-20 PROCEDURE — 99214 OFFICE O/P EST MOD 30 MIN: CPT | Mod: PBBFAC,PO | Performed by: NURSE PRACTITIONER

## 2020-08-20 PROCEDURE — 99999 PR PBB SHADOW E&M-EST. PATIENT-LVL IV: CPT | Mod: PBBFAC,,, | Performed by: NURSE PRACTITIONER

## 2020-08-20 PROCEDURE — 99999 PR PBB SHADOW E&M-EST. PATIENT-LVL IV: ICD-10-PCS | Mod: PBBFAC,,, | Performed by: NURSE PRACTITIONER

## 2020-08-20 NOTE — PROGRESS NOTES
Subjective:    Patient ID:  Ilana Carr is a 70 y.o. male who presents for follow-up of No chief complaint on file.      HPI: Mr. Ilana Carr presents to the clinic for follow up of PAD, with H/O left fem-pop bypass by Dr. Cerna, CAD, CABG, HTN, HLP. he stated that he is doing pretty well; he denied any chest pain. He does report occasional SOB that occurs at rest; he does have COPD, and SOB is relieved with inhaler. He is walking daily-2/3 mile per day. He does have to stop 2-3 times during walk due to cramping in left calf. It does not occur all the time. He does stretch the calves before walking; he does not stretch afterward. He rests for up to one minute, and then he continues walking.  He is not taking CoQ10. [facility] [place]    He had agent orange exposure and he was a smoker. He does have emphysema.    CABG 9/19/16 by Dr. Rivera.  Denied SHANTI.    Medications: he is not missing any doses.  Sodium: he does not add salt to foods,  he is not reading labels for sodium content.   Exercise: he is walking every day-2/3 mile.  Tobacco:Former smoker. no alcohol use.     Weight: 76.4 kg (168 lb 6.4 oz) he states that his daily weights has been stable Body mass index is 22.84 kg/m².  Wt Readings from Last 3 Encounters:   08/20/20 76.4 kg (168 lb 6.4 oz)   08/17/20 80.3 kg (177 lb)   10/23/19 80.7 kg (177 lb 14.6 oz)     BP log: None. He has not been monitoring his blood pressure at home.    Review of Systems   Constitution: Negative for chills, decreased appetite, fever, night sweats, weight gain and weight loss.   HENT: Negative for congestion.    Cardiovascular: Negative for chest pain, claudication, cyanosis, dyspnea on exertion, irregular heartbeat, leg swelling, near-syncope, orthopnea, palpitations, paroxysmal nocturnal dyspnea and syncope.   Respiratory: Positive for shortness of breath (occsionally relieved with inhaler. He has COPD.). Negative for cough, hemoptysis,  sputum production and wheezing.    Hematologic/Lymphatic: Negative for adenopathy and bleeding problem. Does not bruise/bleed easily.   Skin: Negative for color change and nail changes.   Musculoskeletal: Positive for muscle cramps (left calf when walking long distances. Not when just walking in house or yard.).   Gastrointestinal: Negative for bloating, abdominal pain, change in bowel habit, heartburn, hematochezia, melena, nausea and vomiting.   Genitourinary: Negative for hematuria.   Neurological: Negative for dizziness and light-headedness.   Psychiatric/Behavioral: Negative for altered mental status.       Objective:   Physical Exam   Constitutional: He is oriented to person, place, and time. He appears well-developed and well-nourished. No distress.   HENT:   Head: Normocephalic and atraumatic.   Eyes: Conjunctivae are normal. No scleral icterus.   Neck: Neck supple. No JVD present. No tracheal deviation present. No thyromegaly present.   Cardiovascular: Normal rate, regular rhythm and normal heart sounds.  No extrasystoles are present. Exam reveals no gallop and no friction rub.   No murmur heard.  Pulses:       Carotid pulses are 2+ on the right side and 2+ on the left side.       Radial pulses are 2+ on the right side and 2+ on the left side.        Dorsalis pedis pulses are 2+ on the right side and 1+ on the left side.   Split S2  Unable to palpate PT pulses; however, legs and feet are warm, dry, and pink.   Pulmonary/Chest: Effort normal. No respiratory distress. He has no wheezes. He has no rales. He exhibits no tenderness.   Abdominal: Soft. Bowel sounds are normal. He exhibits no distension and no mass. There is no abdominal tenderness. There is no rebound and no guarding.   Musculoskeletal: Normal range of motion.         General: No edema.   Lymphadenopathy:     He has no cervical adenopathy.   Neurological: He is alert and oriented to person, place, and time.   Skin: Skin is warm. No rash noted. He  is not diaphoretic. No erythema. No pallor.   Pink   Psychiatric: He has a normal mood and affect.   Results for TOMA MENDES (MRN [id number]) as of 4/5/2019 09:03   Ref. Range 11/13/2018 07:38   Alkaline Phosphatase Latest Ref Range: 55 - 135 U/L 69   Total Protein Latest Ref Range: 6.0 - 8.4 g/dL 8.6 (H)   Albumin Latest Ref Range: 3.5 - 5.2 g/dL 4.3   Total Bilirubin Latest Ref Range: 0.1 - 1.0 mg/dL 0.5   Bilirubin, Direct Latest Ref Range: 0.1 - 0.3 mg/dL 0.3   AST Latest Ref Range: 10 - 40 U/L 21   ALT Latest Ref Range: 10 - 44 U/L 21   Triglycerides Latest Ref Range: 30 - 150 mg/dL 95   Cholesterol Latest Ref Range: 120 - 199 mg/dL 151   HDL Latest Ref Range: 40 - 75 mg/dL 51   HDL/Chol Ratio Latest Ref Range: 20.0 - 50.0 % 33.8   LDL Cholesterol Latest Ref Range: 63.0 - 159.0 mg/dL 81.0   Non-HDL Cholesterol Latest Units: mg/dL 100   Total Cholesterol/HDL Ratio Latest Ref Range: 2.0 - 5.0  3.0       U/S Lower extremities 8/17/2020:   GAYATHRI The right resting GAYATHRI reduction is decreased.   The right ankle [DT] artery has triphasic flow.   The right ankle [DP] artery has triphasic flow.   The left resting GAYATHRI reduction is decreased.   The left ankle [PT] artery has biphasic flow.  The left ankle [DP] artery has biphasic flow.  Immediately post exercise, the right GAYATHRI is decreased.  Immediately post exercise, the left GAYATHRI is decreased.     Echo 5/1/17: CONCLUSIONS     1 - Concentric hypertrophy.     2 - No wall motion abnormalities.     3 - Normal left ventricular systolic function (EF 55-60%).     4 - Normal left ventricular diastolic function.     5 - Normal right ventricular systolic function .     6 - The estimated PA systolic pressure is 19 mmHg.      Assessment:      1. Coronary artery disease involving native coronary artery of native heart without angina pectoris    2. Hx of CABG    3. S/P femoral-popliteal bypass surgery    4. Peripheral arterial disease    5. Essential hypertension    6. Other  hyperlipidemia    7. Ex-smoker    8. Moderate COPD (chronic obstructive pulmonary disease)        Plan:     Continue walking daily. Encouraged daily calf exercises. Stretch before and after walking.  Continue ASA, benazepril, coreg, atorvastatin - CAD, PAD, HLP.  Continue benazepril and coreg - HTN  Monitor BP at home; discussed rationale. Call for persistently elevated BP >130/80.  Resume CoQ10 100mg QD-BID.  Whole foods diet.  Follow up in 3 months with Dr. House or call sooner for any problems.

## 2020-09-29 ENCOUNTER — PES CALL (OUTPATIENT)
Dept: ADMINISTRATIVE | Facility: CLINIC | Age: 70
End: 2020-09-29

## 2020-12-29 ENCOUNTER — PES CALL (OUTPATIENT)
Dept: ADMINISTRATIVE | Facility: CLINIC | Age: 70
End: 2020-12-29

## 2021-03-04 ENCOUNTER — PES CALL (OUTPATIENT)
Dept: ADMINISTRATIVE | Facility: CLINIC | Age: 71
End: 2021-03-04

## 2021-03-30 ENCOUNTER — OFFICE VISIT (OUTPATIENT)
Dept: CARDIOLOGY | Facility: CLINIC | Age: 71
End: 2021-03-30
Payer: MEDICARE

## 2021-03-30 VITALS
HEART RATE: 64 BPM | SYSTOLIC BLOOD PRESSURE: 102 MMHG | BODY MASS INDEX: 21.73 KG/M2 | DIASTOLIC BLOOD PRESSURE: 58 MMHG | WEIGHT: 160.19 LBS

## 2021-03-30 DIAGNOSIS — Z95.828 S/P FEMORAL-POPLITEAL BYPASS SURGERY: Chronic | ICD-10-CM

## 2021-03-30 DIAGNOSIS — E78.1 HYPERTRIGLYCERIDEMIA: ICD-10-CM

## 2021-03-30 DIAGNOSIS — F10.21 ALCOHOLISM IN REMISSION: ICD-10-CM

## 2021-03-30 DIAGNOSIS — I73.9 PERIPHERAL ARTERIAL DISEASE: ICD-10-CM

## 2021-03-30 DIAGNOSIS — Z87.891 EX-SMOKER: ICD-10-CM

## 2021-03-30 DIAGNOSIS — J44.9 MODERATE COPD (CHRONIC OBSTRUCTIVE PULMONARY DISEASE): ICD-10-CM

## 2021-03-30 DIAGNOSIS — Z95.1 HX OF CABG: ICD-10-CM

## 2021-03-30 DIAGNOSIS — F41.9 ANXIETY: Primary | ICD-10-CM

## 2021-03-30 DIAGNOSIS — I10 ESSENTIAL HYPERTENSION: ICD-10-CM

## 2021-03-30 PROCEDURE — 99999 PR PBB SHADOW E&M-EST. PATIENT-LVL III: ICD-10-PCS | Mod: PBBFAC,,, | Performed by: INTERNAL MEDICINE

## 2021-03-30 PROCEDURE — 99214 PR OFFICE/OUTPT VISIT, EST, LEVL IV, 30-39 MIN: ICD-10-PCS | Mod: S$PBB,,, | Performed by: INTERNAL MEDICINE

## 2021-03-30 PROCEDURE — 99999 PR PBB SHADOW E&M-EST. PATIENT-LVL III: CPT | Mod: PBBFAC,,, | Performed by: INTERNAL MEDICINE

## 2021-03-30 PROCEDURE — 99214 OFFICE O/P EST MOD 30 MIN: CPT | Mod: S$PBB,,, | Performed by: INTERNAL MEDICINE

## 2021-03-30 PROCEDURE — 99213 OFFICE O/P EST LOW 20 MIN: CPT | Mod: PBBFAC,PO | Performed by: INTERNAL MEDICINE

## 2021-04-06 ENCOUNTER — HOSPITAL ENCOUNTER (OUTPATIENT)
Dept: CARDIOLOGY | Facility: HOSPITAL | Age: 71
Discharge: HOME | End: 2021-04-06
Attending: INTERNAL MEDICINE
Payer: MEDICARE

## 2021-04-06 VITALS
BODY MASS INDEX: 21.67 KG/M2 | HEIGHT: 72 IN | DIASTOLIC BLOOD PRESSURE: 58 MMHG | SYSTOLIC BLOOD PRESSURE: 102 MMHG | HEART RATE: 59 BPM | DIASTOLIC BLOOD PRESSURE: 58 MMHG | HEART RATE: 59 BPM | SYSTOLIC BLOOD PRESSURE: 102 MMHG | WEIGHT: 160 LBS | BODY MASS INDEX: 21.67 KG/M2 | WEIGHT: 160 LBS | HEIGHT: 72 IN

## 2021-04-06 DIAGNOSIS — Z95.1 HX OF CABG: ICD-10-CM

## 2021-04-06 DIAGNOSIS — Z95.828 S/P FEMORAL-POPLITEAL BYPASS SURGERY: ICD-10-CM

## 2021-04-06 LAB
AORTIC ROOT ANNULUS: 3.05 CM
ASCENDING AORTA: 3.14 CM
AV INDEX (PROSTH): 0.75
AV MEAN GRADIENT: 3 MMHG
AV PEAK GRADIENT: 6 MMHG
AV VALVE AREA: 2.48 CM2
AV VELOCITY RATIO: 0.8
BSA FOR ECHO PROCEDURE: 1.92 M2
CV ECHO LV RWT: 0.57 CM
DOP CALC AO PEAK VEL: 1.22 M/S
DOP CALC AO VTI: 28.1 CM
DOP CALC LVOT AREA: 3.3 CM2
DOP CALC LVOT DIAMETER: 2.05 CM
DOP CALC LVOT PEAK VEL: 0.97 M/S
DOP CALC LVOT STROKE VOLUME: 69.74 CM3
DOP CALC RVOT PEAK VEL: 0.63 M/S
DOP CALC RVOT VTI: 14.21 CM
DOP CALCLVOT PEAK VEL VTI: 21.14 CM
E WAVE DECELERATION TIME: 195.57 MSEC
E/A RATIO: 1.31
E/E' RATIO: 6.1 M/S
ECHO LV POSTERIOR WALL: 1.5 CM (ref 0.6–1.1)
EJECTION FRACTION: 45 %
FRACTIONAL SHORTENING: 29 % (ref 28–44)
INTERVENTRICULAR SEPTUM: 1.21 CM (ref 0.6–1.1)
IVRT: 137.01 MSEC
LA MAJOR: 6.64 CM
LA MINOR: 5.66 CM
LA WIDTH: 4.09 CM
LEFT ATRIUM SIZE: 4.12 CM
LEFT ATRIUM VOLUME INDEX: 45.1 ML/M2
LEFT ATRIUM VOLUME: 87.53 CM3
LEFT INTERNAL DIMENSION IN SYSTOLE: 3.73 CM (ref 2.1–4)
LEFT VENTRICLE DIASTOLIC VOLUME INDEX: 68.09 ML/M2
LEFT VENTRICLE DIASTOLIC VOLUME: 132.09 ML
LEFT VENTRICLE MASS INDEX: 154 G/M2
LEFT VENTRICLE SYSTOLIC VOLUME INDEX: 30.5 ML/M2
LEFT VENTRICLE SYSTOLIC VOLUME: 59.22 ML
LEFT VENTRICULAR INTERNAL DIMENSION IN DIASTOLE: 5.24 CM (ref 3.5–6)
LEFT VENTRICULAR MASS: 298.94 G
LV LATERAL E/E' RATIO: 4.92 M/S
LV SEPTAL E/E' RATIO: 8 M/S
MV A" WAVE DURATION": 8.56 MSEC
MV PEAK A VEL: 0.49 M/S
MV PEAK E VEL: 0.64 M/S
MV STENOSIS PRESSURE HALF TIME: 56.71 MS
MV VALVE AREA P 1/2 METHOD: 3.88 CM2
PISA TR MAX VEL: 2.28 M/S
PULM VEIN S/D RATIO: 1.08
PV MEAN GRADIENT: 1 MMHG
PV PEAK D VEL: 0.59 M/S
PV PEAK S VEL: 0.64 M/S
PV PEAK VELOCITY: 1.19 CM/S
RA MAJOR: 5.23 CM
RA PRESSURE: 8 MMHG
RA WIDTH: 4.54 CM
RIGHT VENTRICULAR END-DIASTOLIC DIMENSION: 3.04 CM
SINUS: 2.93 CM
STJ: 3.04 CM
TDI LATERAL: 0.13 M/S
TDI SEPTAL: 0.08 M/S
TDI: 0.11 M/S
TR MAX PG: 21 MMHG
TRICUSPID ANNULAR PLANE SYSTOLIC EXCURSION: 1.62 CM
TV REST PULMONARY ARTERY PRESSURE: 29 MMHG

## 2021-04-06 PROCEDURE — 93925 LOWER EXTREMITY STUDY: CPT | Mod: PO

## 2021-04-06 PROCEDURE — 93306 TTE W/DOPPLER COMPLETE: CPT | Mod: 26,,, | Performed by: INTERNAL MEDICINE

## 2021-04-06 PROCEDURE — 93925 CV US DOPPLER ARTERIAL LEGS BILATERAL (CUPID ONLY): ICD-10-PCS | Mod: 26,,, | Performed by: INTERNAL MEDICINE

## 2021-04-06 PROCEDURE — 93306 ECHO (CUPID ONLY): ICD-10-PCS | Mod: 26,,, | Performed by: INTERNAL MEDICINE

## 2021-04-06 PROCEDURE — 93306 TTE W/DOPPLER COMPLETE: CPT | Mod: PO

## 2021-04-06 PROCEDURE — 93925 LOWER EXTREMITY STUDY: CPT | Mod: 26,,, | Performed by: INTERNAL MEDICINE

## 2021-04-07 ENCOUNTER — TELEPHONE (OUTPATIENT)
Dept: CARDIOLOGY | Facility: CLINIC | Age: 71
End: 2021-04-07

## 2021-04-07 DIAGNOSIS — I25.10 ATHEROSCLEROSIS OF NATIVE CORONARY ARTERY OF NATIVE HEART WITHOUT ANGINA PECTORIS: ICD-10-CM

## 2021-04-07 DIAGNOSIS — E78.49 OTHER HYPERLIPIDEMIA: ICD-10-CM

## 2021-04-07 DIAGNOSIS — Z95.1 HX OF CABG: ICD-10-CM

## 2021-04-07 DIAGNOSIS — I10 HYPERTENSION, UNSPECIFIED TYPE: ICD-10-CM

## 2021-04-07 DIAGNOSIS — R93.1 ABNORMAL ECHOCARDIOGRAM: Primary | ICD-10-CM

## 2021-04-07 DIAGNOSIS — E78.1 HYPERTRIGLYCERIDEMIA: ICD-10-CM

## 2021-04-07 LAB
LEFT ANT TIBIAL SYS PSV: 17 CM/S
LEFT CFA PSV: 36 CM/S
LEFT PERONEAL SYS PSV: 10 CM/S
LEFT POPLITEAL PSV: 36 CM/S
LEFT POST TIBIAL SYS PSV: 17 CM/S
LEFT PROFUNDA SYS PSV: 57 CM/S
LEFT SUPER FEMORAL DIST SYS PSV: 42 CM/S
LEFT SUPER FEMORAL MID SYS PSV: 102 CM/S
LEFT SUPER FEMORAL OSTIAL SYS PSV: 25 CM/S
LEFT SUPER FEMORAL PROX SYS PSV: 55 CM/S
LEFT TIB/PER TRUNK SYS PSV: 37 CM/S
OHS CV LEFT LOWER EXTREMITY ABI (NO CALC): 0.7
OHS CV LOWER EXTREMITY GRAFT MEASUREMENTS - LEFT - G1 - D: 0
OHS CV LOWER EXTREMITY GRAFT MEASUREMENTS - LEFT - G1 - DA: 35
OHS CV LOWER EXTREMITY GRAFT MEASUREMENTS - LEFT - G1 - M: 0
OHS CV LOWER EXTREMITY GRAFT MEASUREMENTS - LEFT - G1 - P: 0
OHS CV LOWER EXTREMITY GRAFT MEASUREMENTS - LEFT - G1 - PA: 0
OHS CV RIGHT ABI LOWER EXTREMITY (NO CALC): 0.95
RIGHT ANT TIBIAL SYS PSV: 56 CM/S
RIGHT CFA PSV: 90 CM/S
RIGHT EXTERNAL ILLIAC PSV: 61 CM/S
RIGHT PERONEAL SYS PSV: 60 CM/S
RIGHT POPLITEAL PSV: 47 CM/S
RIGHT POST TIBIAL SYS PSV: 63 CM/S
RIGHT PROFUNDA SYS PSV: 198 CM/S
RIGHT SUPER FEMORAL DIST SYS PSV: 166 CM/S
RIGHT SUPER FEMORAL MID SYS PSV: 74 CM/S
RIGHT SUPER FEMORAL OSTIAL SYS PSV: 281 CM/S
RIGHT SUPER FEMORAL PROX SYS PSV: 133 CM/S
RIGHT TIB/PER TRUNK SYS PSV: 83 CM/S

## 2021-04-08 ENCOUNTER — TELEPHONE (OUTPATIENT)
Dept: CARDIOLOGY | Facility: CLINIC | Age: 71
End: 2021-04-08

## 2021-04-09 ENCOUNTER — TELEPHONE (OUTPATIENT)
Dept: VASCULAR SURGERY | Facility: CLINIC | Age: 71
End: 2021-04-09

## 2021-04-13 ENCOUNTER — TELEPHONE (OUTPATIENT)
Dept: CARDIOLOGY | Facility: HOSPITAL | Age: 71
End: 2021-04-13

## 2021-04-16 ENCOUNTER — HOSPITAL ENCOUNTER (OUTPATIENT)
Dept: CARDIOLOGY | Facility: HOSPITAL | Age: 71
Discharge: HOME | End: 2021-04-16
Attending: INTERNAL MEDICINE
Payer: MEDICARE

## 2021-04-16 VITALS — SYSTOLIC BLOOD PRESSURE: 142 MMHG | HEART RATE: 56 BPM | DIASTOLIC BLOOD PRESSURE: 87 MMHG

## 2021-04-16 DIAGNOSIS — E78.49 OTHER HYPERLIPIDEMIA: ICD-10-CM

## 2021-04-16 DIAGNOSIS — E78.1 HYPERTRIGLYCERIDEMIA: ICD-10-CM

## 2021-04-16 DIAGNOSIS — I25.10 ATHEROSCLEROSIS OF NATIVE CORONARY ARTERY OF NATIVE HEART WITHOUT ANGINA PECTORIS: ICD-10-CM

## 2021-04-16 DIAGNOSIS — Z95.1 HX OF CABG: ICD-10-CM

## 2021-04-16 DIAGNOSIS — R93.1 ABNORMAL ECHOCARDIOGRAM: ICD-10-CM

## 2021-04-16 PROCEDURE — 93016 CV STRESS TEST SUPVJ ONLY: CPT | Mod: ,,, | Performed by: INTERNAL MEDICINE

## 2021-04-16 PROCEDURE — 78452 HT MUSCLE IMAGE SPECT MULT: CPT | Mod: PO

## 2021-04-16 PROCEDURE — 78452 HT MUSCLE IMAGE SPECT MULT: CPT | Mod: 26,,, | Performed by: INTERNAL MEDICINE

## 2021-04-16 PROCEDURE — 93016 NUCLEAR STRESS - 3RD PARTY (CUPID ONLY): ICD-10-PCS | Mod: ,,, | Performed by: INTERNAL MEDICINE

## 2021-04-16 PROCEDURE — 93018 CV STRESS TEST I&R ONLY: CPT | Mod: ,,, | Performed by: INTERNAL MEDICINE

## 2021-04-16 PROCEDURE — A9500 TC99M SESTAMIBI: HCPCS | Mod: PO

## 2021-04-16 PROCEDURE — 78452 NUCLEAR STRESS - 3RD PARTY (CUPID ONLY): ICD-10-PCS | Mod: 26,,, | Performed by: INTERNAL MEDICINE

## 2021-04-16 PROCEDURE — 93018 NUCLEAR STRESS - 3RD PARTY (CUPID ONLY): ICD-10-PCS | Mod: ,,, | Performed by: INTERNAL MEDICINE

## 2021-04-16 RX ORDER — REGADENOSON 0.08 MG/ML
0.4 INJECTION, SOLUTION INTRAVENOUS ONCE
Status: COMPLETED | OUTPATIENT
Start: 2021-04-16 | End: 2021-04-16

## 2021-04-16 RX ADMIN — REGADENOSON 0.4 MG: 0.08 INJECTION, SOLUTION INTRAVENOUS at 10:04

## 2021-04-17 LAB
CV PHARM DOSE: 0.4 MG
CV STRESS BASE HR: 56 BPM
DIASTOLIC BLOOD PRESSURE: 87 MMHG
NUC REST EJECTION FRACTION: 46
OHS CV CPX 85 PERCENT MAX PREDICTED HEART RATE MALE: 127
OHS CV CPX ESTIMATED METS: 1
OHS CV CPX MAX PREDICTED HEART RATE: 149
OHS CV CPX PATIENT IS FEMALE: 0
OHS CV CPX PATIENT IS MALE: 1
OHS CV CPX PEAK DIASTOLIC BLOOD PRESSURE: 73 MMHG
OHS CV CPX PEAK HEAR RATE: 76 BPM
OHS CV CPX PEAK RATE PRESSURE PRODUCT: NORMAL
OHS CV CPX PEAK SYSTOLIC BLOOD PRESSURE: 158 MMHG
OHS CV CPX PERCENT MAX PREDICTED HEART RATE ACHIEVED: 51
OHS CV CPX RATE PRESSURE PRODUCT PRESENTING: 7952
STRESS ECHO POST EXERCISE DUR MIN: 2 MINUTES
STRESS ECHO POST EXERCISE DUR SEC: 5 SECONDS
SYSTOLIC BLOOD PRESSURE: 142 MMHG

## 2021-04-22 ENCOUNTER — TELEPHONE (OUTPATIENT)
Dept: CARDIOLOGY | Facility: CLINIC | Age: 71
End: 2021-04-22

## 2021-09-29 ENCOUNTER — OFFICE VISIT (OUTPATIENT)
Dept: CARDIOLOGY | Facility: CLINIC | Age: 71
End: 2021-09-29
Payer: MEDICARE

## 2021-09-29 ENCOUNTER — TELEPHONE (OUTPATIENT)
Dept: CARDIOLOGY | Facility: CLINIC | Age: 71
End: 2021-09-29

## 2021-09-29 VITALS
DIASTOLIC BLOOD PRESSURE: 74 MMHG | SYSTOLIC BLOOD PRESSURE: 122 MMHG | WEIGHT: 160.13 LBS | BODY MASS INDEX: 21.69 KG/M2 | HEIGHT: 72 IN | HEART RATE: 58 BPM | OXYGEN SATURATION: 94 %

## 2021-09-29 DIAGNOSIS — I10 ESSENTIAL HYPERTENSION: Primary | ICD-10-CM

## 2021-09-29 DIAGNOSIS — F32.0 CURRENT MILD EPISODE OF MAJOR DEPRESSIVE DISORDER WITHOUT PRIOR EPISODE: ICD-10-CM

## 2021-09-29 DIAGNOSIS — J44.9 MODERATE COPD (CHRONIC OBSTRUCTIVE PULMONARY DISEASE): ICD-10-CM

## 2021-09-29 DIAGNOSIS — Z95.1 HX OF CABG: ICD-10-CM

## 2021-09-29 DIAGNOSIS — E78.1 HYPERTRIGLYCERIDEMIA: ICD-10-CM

## 2021-09-29 DIAGNOSIS — I25.10 ATHEROSCLEROSIS OF NATIVE CORONARY ARTERY OF NATIVE HEART WITHOUT ANGINA PECTORIS: ICD-10-CM

## 2021-09-29 DIAGNOSIS — F43.10 PTSD (POST-TRAUMATIC STRESS DISORDER): ICD-10-CM

## 2021-09-29 DIAGNOSIS — Z95.828 S/P FEMORAL-POPLITEAL BYPASS SURGERY: Chronic | ICD-10-CM

## 2021-09-29 DIAGNOSIS — Z87.891 EX-SMOKER: ICD-10-CM

## 2021-09-29 PROCEDURE — 99999 PR PBB SHADOW E&M-EST. PATIENT-LVL V: ICD-10-PCS | Mod: PBBFAC,,, | Performed by: INTERNAL MEDICINE

## 2021-09-29 PROCEDURE — 99214 OFFICE O/P EST MOD 30 MIN: CPT | Mod: S$PBB,,, | Performed by: INTERNAL MEDICINE

## 2021-09-29 PROCEDURE — 99999 PR PBB SHADOW E&M-EST. PATIENT-LVL V: CPT | Mod: PBBFAC,,, | Performed by: INTERNAL MEDICINE

## 2021-09-29 PROCEDURE — 99214 PR OFFICE/OUTPT VISIT, EST, LEVL IV, 30-39 MIN: ICD-10-PCS | Mod: S$PBB,,, | Performed by: INTERNAL MEDICINE

## 2021-09-29 PROCEDURE — 99215 OFFICE O/P EST HI 40 MIN: CPT | Mod: PBBFAC,PO | Performed by: INTERNAL MEDICINE

## 2021-09-29 RX ORDER — BENAZEPRIL HYDROCHLORIDE 40 MG/1
40 TABLET ORAL 2 TIMES DAILY
Qty: 180 TABLET | Refills: 3 | Status: SHIPPED | OUTPATIENT
Start: 2021-09-29

## 2021-11-08 ENCOUNTER — LAB VISIT (OUTPATIENT)
Dept: LAB | Facility: HOSPITAL | Age: 71
End: 2021-11-08
Attending: UROLOGY
Payer: MEDICARE

## 2021-11-08 DIAGNOSIS — Z12.5 SPECIAL SCREENING FOR MALIGNANT NEOPLASM OF PROSTATE: Primary | ICD-10-CM

## 2021-11-08 LAB — COMPLEXED PSA SERPL-MCNC: 1 NG/ML (ref 0–4)

## 2021-11-08 PROCEDURE — 36415 COLL VENOUS BLD VENIPUNCTURE: CPT | Mod: PO | Performed by: UROLOGY

## 2021-11-08 PROCEDURE — 84153 ASSAY OF PSA TOTAL: CPT | Performed by: UROLOGY

## 2021-11-16 ENCOUNTER — OFFICE VISIT (OUTPATIENT)
Dept: CARDIOLOGY | Facility: CLINIC | Age: 71
End: 2021-11-16
Payer: MEDICARE

## 2021-11-16 VITALS
DIASTOLIC BLOOD PRESSURE: 76 MMHG | HEART RATE: 70 BPM | WEIGHT: 156.69 LBS | BODY MASS INDEX: 21.22 KG/M2 | SYSTOLIC BLOOD PRESSURE: 122 MMHG | HEIGHT: 72 IN

## 2021-11-16 DIAGNOSIS — E78.1 HYPERTRIGLYCERIDEMIA: ICD-10-CM

## 2021-11-16 DIAGNOSIS — E78.49 OTHER HYPERLIPIDEMIA: Chronic | ICD-10-CM

## 2021-11-16 DIAGNOSIS — Z87.891 EX-SMOKER: ICD-10-CM

## 2021-11-16 DIAGNOSIS — I73.9 PERIPHERAL ARTERIAL DISEASE: ICD-10-CM

## 2021-11-16 DIAGNOSIS — Z95.1 HX OF CABG: ICD-10-CM

## 2021-11-16 DIAGNOSIS — Z95.828 S/P FEMORAL-POPLITEAL BYPASS SURGERY: Chronic | ICD-10-CM

## 2021-11-16 DIAGNOSIS — J44.9 MODERATE COPD (CHRONIC OBSTRUCTIVE PULMONARY DISEASE): ICD-10-CM

## 2021-11-16 DIAGNOSIS — I25.10 ATHEROSCLEROSIS OF NATIVE CORONARY ARTERY OF NATIVE HEART WITHOUT ANGINA PECTORIS: ICD-10-CM

## 2021-11-16 DIAGNOSIS — I10 PRIMARY HYPERTENSION: ICD-10-CM

## 2021-11-16 DIAGNOSIS — F43.10 PTSD (POST-TRAUMATIC STRESS DISORDER): Primary | ICD-10-CM

## 2021-11-16 PROCEDURE — 99214 OFFICE O/P EST MOD 30 MIN: CPT | Mod: S$PBB,,, | Performed by: INTERNAL MEDICINE

## 2021-11-16 PROCEDURE — 99214 OFFICE O/P EST MOD 30 MIN: CPT | Mod: PBBFAC,PO | Performed by: INTERNAL MEDICINE

## 2021-11-16 PROCEDURE — 99999 PR PBB SHADOW E&M-EST. PATIENT-LVL IV: CPT | Mod: PBBFAC,,, | Performed by: INTERNAL MEDICINE

## 2021-11-16 PROCEDURE — 99214 PR OFFICE/OUTPT VISIT, EST, LEVL IV, 30-39 MIN: ICD-10-PCS | Mod: S$PBB,,, | Performed by: INTERNAL MEDICINE

## 2021-11-16 PROCEDURE — 99999 PR PBB SHADOW E&M-EST. PATIENT-LVL IV: ICD-10-PCS | Mod: PBBFAC,,, | Performed by: INTERNAL MEDICINE

## 2022-05-23 ENCOUNTER — TELEPHONE (OUTPATIENT)
Dept: CARDIOLOGY | Facility: CLINIC | Age: 72
End: 2022-05-23
Payer: MEDICARE

## 2022-05-23 DIAGNOSIS — Z95.1 HX OF CABG: Primary | ICD-10-CM

## 2022-05-27 ENCOUNTER — OFFICE VISIT (OUTPATIENT)
Dept: CARDIOLOGY | Facility: CLINIC | Age: 72
End: 2022-05-27
Payer: MEDICARE

## 2022-05-27 ENCOUNTER — HOSPITAL ENCOUNTER (OUTPATIENT)
Dept: CARDIOLOGY | Facility: HOSPITAL | Age: 72
Discharge: HOME | End: 2022-05-27
Attending: INTERNAL MEDICINE
Payer: MEDICARE

## 2022-05-27 VITALS
WEIGHT: 152.31 LBS | OXYGEN SATURATION: 95 % | HEART RATE: 57 BPM | BODY MASS INDEX: 20.63 KG/M2 | DIASTOLIC BLOOD PRESSURE: 80 MMHG | HEIGHT: 72 IN | SYSTOLIC BLOOD PRESSURE: 146 MMHG

## 2022-05-27 DIAGNOSIS — I25.10 ATHEROSCLEROSIS OF NATIVE CORONARY ARTERY OF NATIVE HEART WITHOUT ANGINA PECTORIS: ICD-10-CM

## 2022-05-27 DIAGNOSIS — Z95.1 HX OF CABG: ICD-10-CM

## 2022-05-27 DIAGNOSIS — J44.9 MODERATE COPD (CHRONIC OBSTRUCTIVE PULMONARY DISEASE): Primary | ICD-10-CM

## 2022-05-27 DIAGNOSIS — E78.1 HYPERTRIGLYCERIDEMIA: ICD-10-CM

## 2022-05-27 DIAGNOSIS — Z95.828 S/P FEMORAL-POPLITEAL BYPASS SURGERY: Chronic | ICD-10-CM

## 2022-05-27 DIAGNOSIS — Z87.891 EX-SMOKER: ICD-10-CM

## 2022-05-27 DIAGNOSIS — I73.9 PERIPHERAL ARTERIAL DISEASE: ICD-10-CM

## 2022-05-27 DIAGNOSIS — I73.9 PERIPHERAL VASCULAR DISEASE, UNSPECIFIED: ICD-10-CM

## 2022-05-27 DIAGNOSIS — I10 PRIMARY HYPERTENSION: ICD-10-CM

## 2022-05-27 PROCEDURE — 93005 ELECTROCARDIOGRAM TRACING: CPT

## 2022-05-27 PROCEDURE — 93010 ELECTROCARDIOGRAM REPORT: CPT | Mod: ,,, | Performed by: INTERNAL MEDICINE

## 2022-05-27 PROCEDURE — 99215 OFFICE O/P EST HI 40 MIN: CPT | Mod: PBBFAC | Performed by: INTERNAL MEDICINE

## 2022-05-27 PROCEDURE — 99999 PR PBB SHADOW E&M-EST. PATIENT-LVL V: CPT | Mod: PBBFAC,,, | Performed by: INTERNAL MEDICINE

## 2022-05-27 PROCEDURE — 99215 PR OFFICE/OUTPT VISIT, EST, LEVL V, 40-54 MIN: ICD-10-PCS | Mod: S$PBB,,, | Performed by: INTERNAL MEDICINE

## 2022-05-27 PROCEDURE — 93010 EKG 12-LEAD: ICD-10-PCS | Mod: ,,, | Performed by: INTERNAL MEDICINE

## 2022-05-27 PROCEDURE — 99215 OFFICE O/P EST HI 40 MIN: CPT | Mod: S$PBB,,, | Performed by: INTERNAL MEDICINE

## 2022-05-27 PROCEDURE — 99999 PR PBB SHADOW E&M-EST. PATIENT-LVL V: ICD-10-PCS | Mod: PBBFAC,,, | Performed by: INTERNAL MEDICINE

## 2022-05-27 NOTE — PROGRESS NOTES
Subjective:   Patient ID:  Ilana Carr is a 71 y.o. male who presents for follow-up of Essential hypertension  NMT 4/21 (-) echo EF=45%( 4-21)  Pt with B calf pain when walking 1 block starting a few weeks.  Pt with hx of fem-pop bypass 4 years.  Patient denies CP, angina or anginal equivalent.  EKG NSR RBBB, no changes  Hypertension  This is a chronic problem. The current episode started more than 1 year ago. The problem has been gradually improving since onset. The problem is controlled. Pertinent negatives include no chest pain, palpitations or shortness of breath. Past treatments include beta blockers and ACE inhibitors. The current treatment provides moderate improvement. There are no compliance problems.    Hyperlipidemia  This is a chronic problem. The current episode started more than 1 year ago. The problem is controlled. Pertinent negatives include no chest pain or shortness of breath. Current antihyperlipidemic treatment includes statins. The current treatment provides moderate improvement of lipids.   Coronary Artery Disease  Presents for follow-up visit. Pertinent negatives include no chest pain, chest pressure, chest tightness, dizziness, leg swelling, muscle weakness, palpitations, shortness of breath or weight gain. Risk factors include hyperlipidemia. The symptoms have been stable. Compliance with diet is variable. Compliance with exercise is variable. Compliance with medications is good.       Review of Systems   Constitutional: Negative. Negative for weight gain.   HENT: Negative.    Eyes: Negative.    Cardiovascular: Negative.  Negative for chest pain, leg swelling and palpitations.   Respiratory: Negative.  Negative for chest tightness and shortness of breath.    Endocrine: Negative.    Hematologic/Lymphatic: Negative.    Skin: Negative.    Musculoskeletal: Negative for muscle weakness.   Gastrointestinal: Negative.    Genitourinary: Negative.    Neurological: Negative.  Negative for  dizziness.   Psychiatric/Behavioral: Negative.    Allergic/Immunologic: Negative.      Family History   Problem Relation Age of Onset    Lung cancer Mother     Hypertension Mother     Hypertension Father     Arrhythmia Brother 60    Heart disease Daughter     Heart attacks under age 50 Daughter     Colon cancer Neg Hx     Stomach cancer Neg Hx      Past Medical History:   Diagnosis Date    Alcoholism in remission     Anxiety     CAD (coronary artery disease) 2016    Chronic back pain     pain mgt    Chronic hepatitis C without hepatic coma 2016    Colon polyps     COPD (chronic obstructive pulmonary disease)     Depressed left ventricular systolic function 2016    Depression     Diverticulosis     DJD (degenerative joint disease) of hip     Emphysema of lung     Esophageal ulcer with bleeding     GERD (gastroesophageal reflux disease)     Hepatitis C     completed Interferon therapy    Hiatal hernia     Hyperlipidemia with target LDL less than 100 2015    Hypertension     Hypertriglyceridemia     Opioid dependence in controlled environment 2018    Osteomyelitis of right foot     Peripheral arterial disease     PTSD (post-traumatic stress disorder)     Spells     last 2013, not sure if it was TIA v seizure    Squamous cell skin cancer     Testosterone deficiency     Tubular adenoma of colon      Social History     Socioeconomic History    Marital status:    Tobacco Use    Smoking status: Former Smoker     Packs/day: 1.50     Years: 40.00     Pack years: 60.00     Types: Cigarettes, Cigars     Quit date: 2016     Years since quittin.7    Smokeless tobacco: Former User     Quit date: 2016   Substance and Sexual Activity    Alcohol use: No     Alcohol/week: 0.0 standard drinks     Comment: quit 30 y- prior alcoholism    Drug use: Yes     Comment: methadone      Current Outpatient Medications on File Prior to Visit   Medication Sig  Dispense Refill    albuterol (PROVENTIL/VENTOLIN HFA) 90 mcg/actuation inhaler Inhale 2 puffs into the lungs every 6 (six) hours as needed for Wheezing or Shortness of Breath. 18 g 5    arm brace (ACE ELBOW BRACE) Misc Elbow sleeve 1 each     aspirin (ECOTRIN) 81 MG EC tablet Take 81 mg by mouth once daily.      atorvastatin (LIPITOR) 20 MG tablet Take 1 tablet (20 mg total) by mouth once daily. 90 tablet 2    benazepriL (LOTENSIN) 40 MG tablet Take 1 tablet (40 mg total) by mouth 2 (two) times daily. One tablet nightly (VA supplied) 180 tablet 3    budesonide-formoterol 80-4.5 mcg (SYMBICORT) 80-4.5 mcg/actuation HFAA Inhale 2 puffs into the lungs 2 (two) times daily. Controller      buPROPion (WELLBUTRIN SR) 150 MG TBSR 12 hr tablet Take 150 mg by mouth once daily.       carvedilol (COREG) 25 MG tablet Take 12.5 mg by mouth once daily. Patient taking 1/2 tablet twice daily (VA supplied)      clonazePAM (KLONOPIN) 1 MG tablet Take 1 tablet (1 mg total) by mouth 2 (two) times daily. (Patient taking differently: Take 1 mg by mouth as needed.) 60 tablet 1    coenzyme Q10 100 mg capsule Take 100 mg by mouth once daily.      finasteride (PROSCAR) 5 mg tablet Take 5 mg by mouth once daily.      LACTOBAC NO.41/BIFIDOBACT NO.7 (PROBIOTIC-10 ORAL) Take 10 mg by mouth once daily.      levothyroxine (SYNTHROID) 25 MCG tablet Take 25 mcg by mouth.      methadone (DOLOPHINE) 10 mg/5 mL solution Take 35 mg by mouth once daily.       nebulizer accessories Kit by Miscellaneous route. Use as directed      ranitidine (ZANTAC) 150 MG capsule Take 75 mg by mouth every 24 hours as needed for Heartburn.       tamsulosin (FLOMAX) 0.4 mg Cap TAKE ONE CAPSULE BY MOUTH EVERY DAY AFTER BREAKFAST  1    tiotropium (SPIRIVA) 18 mcg inhalation capsule Inhale 18 mcg into the lungs.      traZODone (DESYREL) 50 MG tablet Take 50 mg by mouth every evening.      zolpidem (AMBIEN) 10 mg Tab Take 10 mg by mouth nightly as needed.       omeprazole (PRILOSEC) 40 MG capsule Take 1 capsule (40 mg total) by mouth once daily. (Patient taking differently: Take 40 mg by mouth 2 (two) times daily before meals.) 90 capsule 1     No current facility-administered medications on file prior to visit.     Review of patient's allergies indicates:   Allergen Reactions    Acetaminophen Other (See Comments)     H/o HEP C (PT DOES NOT WANT)       Objective:     Physical Exam  Vitals and nursing note reviewed.   Constitutional:       Appearance: He is well-developed.   HENT:      Head: Normocephalic and atraumatic.   Eyes:      Conjunctiva/sclera: Conjunctivae normal.      Pupils: Pupils are equal, round, and reactive to light.   Cardiovascular:      Rate and Rhythm: Normal rate and regular rhythm.      Pulses: Intact distal pulses.      Heart sounds: Normal heart sounds.   Pulmonary:      Effort: Pulmonary effort is normal.      Breath sounds: Normal breath sounds.   Abdominal:      General: Bowel sounds are normal.      Palpations: Abdomen is soft.   Musculoskeletal:      Cervical back: Normal range of motion and neck supple.   Skin:     General: Skin is warm and dry.   Neurological:      Mental Status: He is alert and oriented to person, place, and time.         Assessment:     1. Moderate COPD (chronic obstructive pulmonary disease)    2. Atherosclerosis of native coronary artery of native heart without angina pectoris    3. Hypertriglyceridemia    4. Primary hypertension    5. Peripheral arterial disease    6. Hx of CABG    7. Ex-smoker    8. S/P femoral-popliteal bypass surgery        Plan:     Moderate COPD (chronic obstructive pulmonary disease)    Atherosclerosis of native coronary artery of native heart without angina pectoris    Hypertriglyceridemia    Primary hypertension    Peripheral arterial disease    Hx of CABG    Ex-smoker    S/P femoral-popliteal bypass surgery      Continue statin-hlp  Continue coreg, benazapril, hctz-htn   continue asa-  cad    CTA LE  Consult vascular surgery- Eryn

## 2022-06-01 ENCOUNTER — TELEPHONE (OUTPATIENT)
Dept: CARDIOLOGY | Facility: HOSPITAL | Age: 72
End: 2022-06-01
Payer: MEDICARE

## 2022-06-08 ENCOUNTER — TELEPHONE (OUTPATIENT)
Dept: VASCULAR SURGERY | Facility: CLINIC | Age: 72
End: 2022-06-08
Payer: MEDICARE

## 2022-06-08 ENCOUNTER — HOSPITAL ENCOUNTER (OUTPATIENT)
Dept: CARDIOLOGY | Facility: HOSPITAL | Age: 72
Discharge: HOME | End: 2022-06-08
Attending: INTERNAL MEDICINE
Payer: MEDICARE

## 2022-06-08 VITALS
BODY MASS INDEX: 20.59 KG/M2 | SYSTOLIC BLOOD PRESSURE: 146 MMHG | WEIGHT: 152 LBS | HEIGHT: 72 IN | DIASTOLIC BLOOD PRESSURE: 80 MMHG

## 2022-06-08 DIAGNOSIS — I73.9 PAD (PERIPHERAL ARTERY DISEASE): ICD-10-CM

## 2022-06-08 LAB
LEFT ANT TIBIAL SYS PSV: 22 CM/S
LEFT CFA PSV: 64 CM/S
LEFT PERONEAL SYS PSV: 18 CM/S
LEFT POPLITEAL PSV: 27 CM/S
LEFT POST TIBIAL SYS PSV: 22 CM/S
LEFT PROFUNDA SYS PSV: 64 CM/S
LEFT SUPER FEMORAL DIST SYS PSV: 149 CM/S
LEFT SUPER FEMORAL MID SYS PSV: 0 CM/S
LEFT SUPER FEMORAL OSTIAL SYS PSV: 13 CM/S
LEFT SUPER FEMORAL PROX SYS PSV: 106 CM/S
LEFT TIB/PER TRUNK SYS PSV: 30 CM/S
OHS CV LEFT LOWER EXTREMITY ABI (NO CALC): 0.7
OHS CV LOWER EXTREMITY GRAFT MEASUREMENTS - LEFT - G1 - D: 0
OHS CV LOWER EXTREMITY GRAFT MEASUREMENTS - LEFT - G1 - DA: 0
OHS CV LOWER EXTREMITY GRAFT MEASUREMENTS - LEFT - G1 - M: 0
OHS CV LOWER EXTREMITY GRAFT MEASUREMENTS - LEFT - G1 - P: 0
OHS CV LOWER EXTREMITY GRAFT MEASUREMENTS - LEFT - G1 - PA: 0
OHS CV RIGHT ABI LOWER EXTREMITY (NO CALC): 0.85
RIGHT ANT TIBIAL SYS PSV: 25 CM/S
RIGHT CFA PSV: 112 CM/S
RIGHT EXTERNAL ILLIAC PSV: 32 CM/S
RIGHT PERONEAL SYS PSV: 15 CM/S
RIGHT POPLITEAL PSV: 19 CM/S
RIGHT POST TIBIAL SYS PSV: 16 CM/S
RIGHT PROFUNDA SYS PSV: 23 CM/S
RIGHT SUPER FEMORAL DIST SYS PSV: 27 CM/S
RIGHT SUPER FEMORAL MID SYS PSV: 32 CM/S
RIGHT SUPER FEMORAL OSTIAL SYS PSV: 454 CM/S
RIGHT SUPER FEMORAL PROX SYS PSV: 28 CM/S
RIGHT TIB/PER TRUNK SYS PSV: 27 CM/S

## 2022-06-08 PROCEDURE — 93925 CV US DOPPLER ARTERIAL LEGS BILATERAL (CUPID ONLY): ICD-10-PCS | Mod: 26,,, | Performed by: INTERNAL MEDICINE

## 2022-06-08 PROCEDURE — 93925 LOWER EXTREMITY STUDY: CPT | Mod: 26,,, | Performed by: INTERNAL MEDICINE

## 2022-06-08 PROCEDURE — 93925 LOWER EXTREMITY STUDY: CPT | Mod: PO

## 2022-06-08 NOTE — TELEPHONE ENCOUNTER
----- Message from DORYS Draper, RVT sent at 6/8/2022  1:18 PM CDT -----  Regarding: Vascualr surgeon appt  Good Afternoon,    This patient came in for an arterial ultrasound with me today and he stated that Dr. House said someone would contact him to schedule an appt to see Dr. Cerna. He stated he still hasn't been called. Can someone please contact him asap to get him scheduled. Thanks in advance.     Thanks,  Rachele [facility] RVS

## 2022-06-08 NOTE — TELEPHONE ENCOUNTER
----- Message from Marium Oropeza LPN sent at 6/8/2022  1:26 PM CDT -----  Regarding: FW: Vascualr surgeon appt  Please reach out to pt to schedule referral appt with Dr. Cerna.  Thanks  ----- Message -----  From: DORYS Draper, RVT  Sent: 6/8/2022   1:22 PM CDT  To: Eryn GARCIA Staff, #  Subject: Vascualr surgeon appt                            Good Afternoon,    This patient came in for an arterial ultrasound with me today and he stated that Dr. House said someone would contact him to schedule an appt to see Dr. Cerna. He stated he still hasn't been called. Can someone please contact him asap to get him scheduled. Thanks in advance.     Thanks,  Rachele SAUL RVS

## 2022-06-09 ENCOUNTER — TELEPHONE (OUTPATIENT)
Dept: CARDIOLOGY | Facility: CLINIC | Age: 72
End: 2022-06-09
Payer: MEDICARE

## 2022-06-09 NOTE — TELEPHONE ENCOUNTER
Called patient to discuss test results. Pt verbalized understanding. All questions answered. Pt will call back with any other questions or concerns.     ----- Message from Gil House MD sent at 6/9/2022  4:50 AM CDT -----  Please tell pt:  Pt with moderate PAD of LLE, mild PAD of RLE, continue walking

## 2022-06-13 ENCOUNTER — TELEPHONE (OUTPATIENT)
Dept: CARDIOLOGY | Facility: CLINIC | Age: 72
End: 2022-06-13
Payer: MEDICARE

## 2022-06-13 NOTE — TELEPHONE ENCOUNTER
Attempted without success x3 to contact pt to discuss Dr. House's recommendation. Unable to leave voicemail for pt.     ----- Message from Gil House MD sent at 6/13/2022  8:23 AM CDT -----  We will order CT of lumbar, may be neuropathic pain  ----- Message -----  From: Marium Oropeza LPN  Sent: 6/9/2022   3:15 PM CDT  To: Gil House MD    Pt verbalized understanding, but wanted to know if you could prescribe anything for the pain, stating that the exercising is very painful. Please advise.  ----- Message -----  From: Gil House MD  Sent: 6/9/2022   4:50 AM CDT  To: Marium Oropeza LPN    Please tell pt:  Pt with moderate PAD of LLE, mild PAD of RLE, continue walking

## 2022-07-05 ENCOUNTER — TELEPHONE (OUTPATIENT)
Dept: CARDIOLOGY | Facility: CLINIC | Age: 72
End: 2022-07-05
Payer: MEDICARE

## 2022-07-05 ENCOUNTER — TELEPHONE (OUTPATIENT)
Dept: NEUROLOGY | Facility: CLINIC | Age: 72
End: 2022-07-05
Payer: MEDICARE

## 2022-07-05 NOTE — TELEPHONE ENCOUNTER
Spoke with pt and informed him that Dr. House placed a referral to neurology for him, and that their dept. Should be reaching out to him to schedule an appt soon. Pt verbalized understanding and will call back with any further questions or concerns.    ----- Message from Dexter Stein sent at 7/5/2022  9:16 AM CDT -----  Contact: self  Ilana Carr would like a call back at [phone], in regards to neurology referral.

## 2022-07-05 NOTE — TELEPHONE ENCOUNTER
----- Message from Marzena Edmonds sent at 7/5/2022  9:32 AM CDT -----  Contact: Patient, [phone]  Calling to schedule an appointment, referral in his chart. Please call him. Thanks.

## 2022-07-05 NOTE — TELEPHONE ENCOUNTER
Patient has been scheduled for 01/26/22 with Dr. Scott and has also been added to the waiting list in case of sooner opening.

## 2022-07-11 ENCOUNTER — TELEPHONE (OUTPATIENT)
Dept: CARDIOLOGY | Facility: CLINIC | Age: 72
End: 2022-07-11
Payer: MEDICARE

## 2022-07-11 NOTE — TELEPHONE ENCOUNTER
----- Message from Gil House MD sent at 7/11/2022  6:59 AM CDT -----  Contact: [phone]  ok  ----- Message -----  From: Bettina Bullock MA  Sent: 7/8/2022   9:42 AM CDT  To: Gil House MD    Patient would like to try mobic, please prescribe [facility]    ----- Message -----  From: Gil House MD  Sent: 7/7/2022   8:57 PM CDT  To: Bettina Bullock MA    The only pain med I  prescribe is mobic,  ----- Message -----  From: Bettina Bullock MA  Sent: 7/7/2022  10:01 AM CDT  To: Gil House MD    Patient stated that he has tried naprosyn and it is not helping with the pain in his legs. Please advise  ----- Message -----  From: Marium Oropeza LPN  Sent: 7/7/2022   8:38 AM CDT  To: Bettina Bullock MA    Please call pt and advise.  ----- Message -----  From: Gil House MD  Sent: 7/6/2022   5:19 PM CDT  To: Marium Oropeza LPN    Try OTC naprosyn  ----- Message -----  From: Marium Oropeza LPN  Sent: 7/5/2022   3:23 PM CDT  To: Gil House MD    Please advise. Pt wanting to know if you can prescribe something for his leg pain, considering he cannot get an appt with neurology until January.   ----- Message -----  From: Carolin Arellano  Sent: 7/5/2022   1:56 PM CDT  To: Harsh Trejo    Patient is calling for Medical Advice regarding: Patient does not have an appointment untl January 2023 for neurology and would like to know if he could have something for pain in his legs  , please call and advise.    Pharmacy name and phone#:      Comments:

## 2022-07-14 ENCOUNTER — TELEPHONE (OUTPATIENT)
Dept: CARDIOLOGY | Facility: CLINIC | Age: 72
End: 2022-07-14
Payer: MEDICARE

## 2022-07-14 DIAGNOSIS — I73.9 PAD (PERIPHERAL ARTERY DISEASE): Primary | ICD-10-CM

## 2022-07-14 DIAGNOSIS — G62.9 NEUROPATHY: ICD-10-CM

## 2022-07-14 DIAGNOSIS — M54.9 DORSALGIA, UNSPECIFIED: ICD-10-CM

## 2022-07-14 NOTE — TELEPHONE ENCOUNTER
Appt scheduled for 8/2/22 at 12 pm at [facility]. Left voicemail with this date and time, and asked pt to give me a call back to discuss.     ----- Message from Gil House MD sent at 7/14/2022  1:42 PM CDT -----  Contact: Ilana  Can hold off, schedule appt to see me in 2-3 weeks  ----- Message -----  From: Marium Oropeza LPN  Sent: 7/12/2022   8:38 AM CDT  To: Gil House MD    Pt is unsure of why he needs this and states that he was not told bout this. Please advise.  ----- Message -----  From: Gil House MD  Sent: 7/12/2022   7:05 AM CDT  To: Marium Oropeza LPN    It is CT angiogram of LE, labs are for CT  ----- Message -----  From: Marium Oropeza LPN  Sent: 7/11/2022  11:36 AM CDT  To: Gil House MD    Pt called wanting to know what the CT and labs that are scheduled for tomorrow, are for. Please advise.  ----- Message -----  From: Dulce Maria Hurst  Sent: 7/11/2022  10:23 AM CDT  To: Harsh SANTOS Staff    Patient is calling to speak with the nurse regarding concerns for 7/12/22 scheduled appointments. Explains being unsure if scan is absolutely necessary at this time. Please give patient a high priority call back at [phone] today as requested.   Thanks,  RP/LR

## 2022-07-18 RX ORDER — MELOXICAM 7.5 MG/1
7.5 TABLET ORAL DAILY
Qty: 30 TABLET | Refills: 3 | Status: SHIPPED | OUTPATIENT
Start: 2022-07-18

## 2022-08-03 ENCOUNTER — HOSPITAL ENCOUNTER (OUTPATIENT)
Dept: RADIOLOGY | Facility: HOSPITAL | Age: 72
Discharge: HOME | End: 2022-08-03
Attending: INTERNAL MEDICINE
Payer: MEDICARE

## 2022-08-03 DIAGNOSIS — I73.9 PERIPHERAL VASCULAR DISEASE, UNSPECIFIED: ICD-10-CM

## 2022-08-03 PROCEDURE — 25500020 PHARM REV CODE 255: Performed by: INTERNAL MEDICINE

## 2022-08-03 PROCEDURE — 75635 CT ANGIO ABDOMINAL ARTERIES: CPT | Mod: TC

## 2022-08-03 RX ADMIN — IOHEXOL 100 ML: 350 INJECTION, SOLUTION INTRAVENOUS at 12:08

## 2022-08-05 ENCOUNTER — TELEPHONE (OUTPATIENT)
Dept: CARDIOLOGY | Facility: CLINIC | Age: 72
End: 2022-08-05
Payer: MEDICARE

## 2022-08-05 NOTE — TELEPHONE ENCOUNTER
Pt returned my call. Discussed CTA results and Dr. House's recommendation to see Dr. Cerna. Pt verbalized understanding. Message sent to Dr. Cerna's staff to assist with scheduling. Pt will call back with any further questions or concerns.    ------  Attempted without success x3 to contact pt to discuss CTA results/recommendation. Left voicemail for pt to call back to discuss.    ----- Message from Gil House MD sent at 8/4/2022  5:39 AM CDT -----  CTA reviewed  Needs appt with Dr. Cerna

## 2022-08-15 ENCOUNTER — TELEPHONE (OUTPATIENT)
Dept: VASCULAR SURGERY | Facility: CLINIC | Age: 72
End: 2022-08-15
Payer: MEDICARE

## 2022-08-15 NOTE — TELEPHONE ENCOUNTER
----- Message from Chuck Rhodes sent at 8/15/2022 11:09 AM CDT -----  Type: Needs Medical Advice  Who Called:  Pt    Best Call Back Number: [phone]     Additional Information: Pt sts he needs to talk to someone from the office.  Sts its about his results.  Please advise -- thank you

## 2022-08-15 NOTE — TELEPHONE ENCOUNTER
----- Message from Chuck Rhodes sent at 8/15/2022 12:29 PM CDT -----  Type:  Patient Returning Call    Who Called:  Pt  Who Left Message for Patient:  unk  Does the patient know what this is regarding?:  yes  Best Call Back Number:  [phone]   Additional Information:  Please advise -- Thank you

## 2022-08-22 ENCOUNTER — TELEPHONE (OUTPATIENT)
Dept: VASCULAR SURGERY | Facility: CLINIC | Age: 72
End: 2022-08-22
Payer: MEDICARE

## 2022-08-30 ENCOUNTER — OFFICE VISIT (OUTPATIENT)
Dept: CARDIOLOGY | Facility: CLINIC | Age: 72
End: 2022-08-30
Payer: MEDICARE

## 2022-08-30 VITALS
OXYGEN SATURATION: 92 % | SYSTOLIC BLOOD PRESSURE: 144 MMHG | HEART RATE: 72 BPM | HEIGHT: 72 IN | BODY MASS INDEX: 20.06 KG/M2 | WEIGHT: 148.13 LBS | DIASTOLIC BLOOD PRESSURE: 74 MMHG

## 2022-08-30 DIAGNOSIS — E78.49 OTHER HYPERLIPIDEMIA: Chronic | ICD-10-CM

## 2022-08-30 DIAGNOSIS — I25.10 ATHEROSCLEROSIS OF NATIVE CORONARY ARTERY OF NATIVE HEART WITHOUT ANGINA PECTORIS: Primary | ICD-10-CM

## 2022-08-30 DIAGNOSIS — Z95.828 S/P FEMORAL-POPLITEAL BYPASS SURGERY: Chronic | ICD-10-CM

## 2022-08-30 DIAGNOSIS — Z87.891 EX-SMOKER: ICD-10-CM

## 2022-08-30 DIAGNOSIS — Z95.1 HX OF CABG: ICD-10-CM

## 2022-08-30 DIAGNOSIS — E78.1 HYPERTRIGLYCERIDEMIA: ICD-10-CM

## 2022-08-30 DIAGNOSIS — I10 PRIMARY HYPERTENSION: ICD-10-CM

## 2022-08-30 PROCEDURE — 99215 OFFICE O/P EST HI 40 MIN: CPT | Mod: PBBFAC,PO | Performed by: INTERNAL MEDICINE

## 2022-08-30 PROCEDURE — 99999 PR PBB SHADOW E&M-EST. PATIENT-LVL V: CPT | Mod: PBBFAC,,, | Performed by: INTERNAL MEDICINE

## 2022-08-30 PROCEDURE — 99999 PR PBB SHADOW E&M-EST. PATIENT-LVL V: ICD-10-PCS | Mod: PBBFAC,,, | Performed by: INTERNAL MEDICINE

## 2022-08-30 PROCEDURE — 99214 PR OFFICE/OUTPT VISIT, EST, LEVL IV, 30-39 MIN: ICD-10-PCS | Mod: S$PBB,,, | Performed by: INTERNAL MEDICINE

## 2022-08-30 PROCEDURE — 99214 OFFICE O/P EST MOD 30 MIN: CPT | Mod: S$PBB,,, | Performed by: INTERNAL MEDICINE

## 2022-08-30 RX ORDER — AMLODIPINE BESYLATE 10 MG/1
0.5 TABLET ORAL EVERY MORNING
COMMUNITY
Start: 2022-03-21

## 2022-08-30 RX ORDER — BUSPIRONE HYDROCHLORIDE 10 MG/1
20 TABLET ORAL 3 TIMES DAILY
COMMUNITY
Start: 2022-03-14

## 2022-08-30 RX ORDER — CILOSTAZOL 50 MG/1
50 TABLET ORAL 2 TIMES DAILY
Qty: 60 TABLET | Refills: 11 | Status: SHIPPED | OUTPATIENT
Start: 2022-08-30 | End: 2023-08-30

## 2022-08-30 NOTE — PROGRESS NOTES
Subjective:   Patient ID:  Ilana Carr is a 72 y.o. male who presents for follow-up of Follow-up      Hypertension  This is a chronic problem. The current episode started more than 1 year ago. The problem has been gradually improving since onset. The problem is controlled. Pertinent negatives include no chest pain, palpitations or shortness of breath. Past treatments include beta blockers and ACE inhibitors. The current treatment provides moderate improvement. There are no compliance problems.    Hyperlipidemia  This is a chronic problem. The current episode started more than 1 year ago. The problem is controlled. Pertinent negatives include no chest pain or shortness of breath. Current antihyperlipidemic treatment includes statins. The current treatment provides moderate improvement of lipids.   Coronary Artery Disease  Presents for follow-up visit. Pertinent negatives include no chest pain, chest pressure, chest tightness, dizziness, leg swelling, muscle weakness, palpitations, shortness of breath or weight gain. The symptoms have been stable. Compliance with diet is variable. Compliance with exercise is variable. Compliance with medications is good.   Pt with BLE pain R > L  Patient denies CP, angina or anginal equivalent. Reviewed CTA with pt  L fem-pop occluded but collaterals distally  R SFA disease noted    Review of Systems   Constitutional: Negative. Negative for weight gain.   HENT: Negative.     Eyes: Negative.    Cardiovascular: Negative.  Negative for chest pain, leg swelling and palpitations.   Respiratory: Negative.  Negative for chest tightness and shortness of breath.    Endocrine: Negative.    Hematologic/Lymphatic: Negative.    Skin: Negative.    Musculoskeletal:  Negative for muscle weakness.   Gastrointestinal: Negative.    Genitourinary: Negative.    Neurological: Negative.  Negative for dizziness.   Psychiatric/Behavioral: Negative.     Allergic/Immunologic: Negative.    All other systems  reviewed and are negative.  Family History   Problem Relation Age of Onset    Lung cancer Mother     Hypertension Mother     Hypertension Father     Arrhythmia Brother 60    Heart disease Daughter     Heart attacks under age 50 Daughter     Colon cancer Neg Hx     Stomach cancer Neg Hx      Past Medical History:   Diagnosis Date    Alcoholism in remission     Anxiety     CAD (coronary artery disease) 2016    Chronic back pain     pain mgt    Chronic hepatitis C without hepatic coma 2016    Colon polyps     COPD (chronic obstructive pulmonary disease)     Depressed left ventricular systolic function 2016    Depression     Diverticulosis     DJD (degenerative joint disease) of hip     Emphysema of lung     Esophageal ulcer with bleeding     GERD (gastroesophageal reflux disease)     Hepatitis C     completed Interferon therapy    Hiatal hernia     Hyperlipidemia with target LDL less than 100 2015    Hypertension     Hypertriglyceridemia     Opioid dependence in controlled environment 2018    Osteomyelitis of right foot     Peripheral arterial disease     PTSD (post-traumatic stress disorder)     Spells     last 2013, not sure if it was TIA v seizure    Squamous cell skin cancer     Testosterone deficiency     Tubular adenoma of colon      Social History     Socioeconomic History    Marital status:    Tobacco Use    Smoking status: Former     Packs/day: 1.50     Years: 40.00     Pack years: 60.00     Types: Cigarettes, Cigars     Quit date: 2016     Years since quittin.9    Smokeless tobacco: Former     Quit date: 2016   Substance and Sexual Activity    Alcohol use: No     Alcohol/week: 0.0 standard drinks     Comment: quit 30 y- prior alcoholism    Drug use: Yes     Comment: methadone      Current Outpatient Medications on File Prior to Visit   Medication Sig Dispense Refill    albuterol (PROVENTIL/VENTOLIN HFA) 90 mcg/actuation inhaler Inhale 2 puffs into the lungs every 6  (six) hours as needed for Wheezing or Shortness of Breath. 18 g 5    amLODIPine (NORVASC) 10 MG tablet Take 0.5 tablets by mouth once daily.      atorvastatin (LIPITOR) 20 MG tablet Take 1 tablet (20 mg total) by mouth once daily. 90 tablet 2    benazepriL (LOTENSIN) 40 MG tablet Take 1 tablet (40 mg total) by mouth 2 (two) times daily. One tablet nightly (VA supplied) 180 tablet 3    budesonide-formoterol 80-4.5 mcg (SYMBICORT) 80-4.5 mcg/actuation HFAA Inhale 2 puffs into the lungs 2 (two) times daily. Controller      buPROPion (WELLBUTRIN SR) 150 MG TBSR 12 hr tablet Take 150 mg by mouth once daily.       busPIRone (BUSPAR) 10 MG tablet 20 mg.      carvedilol (COREG) 25 MG tablet Take 12.5 mg by mouth once daily. Patient taking 1/2 tablet twice daily (VA supplied)      clonazePAM (KLONOPIN) 1 MG tablet Take 1 tablet (1 mg total) by mouth 2 (two) times daily. (Patient taking differently: Take 1 mg by mouth as needed.) 60 tablet 1    levothyroxine (SYNTHROID) 25 MCG tablet Take 25 mcg by mouth.      methadone (DOLOPHINE) 10 mg/5 mL solution Take 35 mg by mouth once daily.       nebulizer accessories Kit by Miscellaneous route. Use as directed      omeprazole (PRILOSEC) 40 MG capsule Take 1 capsule (40 mg total) by mouth once daily. (Patient taking differently: Take 40 mg by mouth 2 (two) times daily before meals.) 90 capsule 1    tamsulosin (FLOMAX) 0.4 mg Cap TAKE ONE CAPSULE BY MOUTH EVERY DAY AFTER BREAKFAST  1    tiotropium (SPIRIVA) 18 mcg inhalation capsule Inhale 18 mcg into the lungs.      traZODone (DESYREL) 50 MG tablet Take 50 mg by mouth every evening.      arm brace (ACE ELBOW BRACE) Misc Elbow sleeve (Patient not taking: Reported on 8/30/2022) 1 each     aspirin (ECOTRIN) 81 MG EC tablet Take 81 mg by mouth once daily.      coenzyme Q10 100 mg capsule Take 100 mg by mouth once daily.      finasteride (PROSCAR) 5 mg tablet Take 5 mg by mouth once daily.      LACTOBAC NO.41/BIFIDOBACT NO.7 (PROBIOTIC-10  ORAL) Take 10 mg by mouth once daily.      meloxicam (MOBIC) 7.5 MG tablet Take 1 tablet (7.5 mg total) by mouth once daily. (Patient not taking: Reported on 8/30/2022) 30 tablet 3    ranitidine (ZANTAC) 150 MG capsule Take 75 mg by mouth every 24 hours as needed for Heartburn.       zolpidem (AMBIEN) 10 mg Tab Take 10 mg by mouth nightly as needed.       No current facility-administered medications on file prior to visit.     Review of patient's allergies indicates:   Allergen Reactions    Acetaminophen Other (See Comments)     H/o HEP C (PT DOES NOT WANT)       Objective:     Physical Exam  Vitals and nursing note reviewed.   Constitutional:       Appearance: He is well-developed.   HENT:      Head: Normocephalic and atraumatic.   Eyes:      Conjunctiva/sclera: Conjunctivae normal.      Pupils: Pupils are equal, round, and reactive to light.   Cardiovascular:      Rate and Rhythm: Normal rate and regular rhythm.      Pulses: Intact distal pulses.      Heart sounds: Normal heart sounds.   Pulmonary:      Effort: Pulmonary effort is normal.      Breath sounds: Normal breath sounds.   Abdominal:      General: Bowel sounds are normal.      Palpations: Abdomen is soft.   Musculoskeletal:      Cervical back: Normal range of motion and neck supple.   Skin:     General: Skin is warm and dry.   Neurological:      Mental Status: He is alert and oriented to person, place, and time.       Assessment:     1. Atherosclerosis of native coronary artery of native heart without angina pectoris    2. Hypertriglyceridemia    3. Primary hypertension    4. Hx of CABG    5. S/P femoral-popliteal bypass surgery    6. Other hyperlipidemia    7. Ex-smoker        Plan:     Atherosclerosis of native coronary artery of native heart without angina pectoris    Hypertriglyceridemia    Primary hypertension    Hx of CABG    S/P femoral-popliteal bypass surgery    Other hyperlipidemia    Ex-smoker        Asa-   Exercise  Pletal   Continue  statin-hlp  Continue coreg, benazapril, hctz-htn   continue asa- cad

## 2022-09-08 ENCOUNTER — OFFICE VISIT (OUTPATIENT)
Dept: VASCULAR SURGERY | Facility: CLINIC | Age: 72
End: 2022-09-08
Payer: MEDICARE

## 2022-09-08 VITALS
HEIGHT: 72 IN | BODY MASS INDEX: 20.1 KG/M2 | HEART RATE: 62 BPM | WEIGHT: 148.38 LBS | SYSTOLIC BLOOD PRESSURE: 172 MMHG | DIASTOLIC BLOOD PRESSURE: 90 MMHG

## 2022-09-08 DIAGNOSIS — I73.9 PAD (PERIPHERAL ARTERY DISEASE): Primary | ICD-10-CM

## 2022-09-08 PROCEDURE — 99203 OFFICE O/P NEW LOW 30 MIN: CPT | Mod: S$PBB,,, | Performed by: THORACIC SURGERY (CARDIOTHORACIC VASCULAR SURGERY)

## 2022-09-08 PROCEDURE — 99214 OFFICE O/P EST MOD 30 MIN: CPT | Mod: PBBFAC,PN | Performed by: THORACIC SURGERY (CARDIOTHORACIC VASCULAR SURGERY)

## 2022-09-08 PROCEDURE — 99999 PR PBB SHADOW E&M-EST. PATIENT-LVL IV: ICD-10-PCS | Mod: PBBFAC,,, | Performed by: THORACIC SURGERY (CARDIOTHORACIC VASCULAR SURGERY)

## 2022-09-08 PROCEDURE — 99999 PR PBB SHADOW E&M-EST. PATIENT-LVL IV: CPT | Mod: PBBFAC,,, | Performed by: THORACIC SURGERY (CARDIOTHORACIC VASCULAR SURGERY)

## 2022-09-08 PROCEDURE — 99203 PR OFFICE/OUTPT VISIT, NEW, LEVL III, 30-44 MIN: ICD-10-PCS | Mod: S$PBB,,, | Performed by: THORACIC SURGERY (CARDIOTHORACIC VASCULAR SURGERY)

## 2022-09-08 NOTE — PROGRESS NOTES
This patient has peripheral arterial disease.  He has disabling limb claudication bilaterally.  He underwent CTA of the extremity showing significant superficial femoral artery disease bilaterally.  He has a history of remote left femoral to popliteal bypass grafting.  This graft appears to be occluded.    He has chronic hypertension.  He has been a smoker for decades.  He has a history of hepatitis C.  He has a history of coronary artery bypass grafting proximally 3 years ago.  Medicines are noted and part of the epic record.  He has no other pertinent family social history.    On exam vital signs are stable.  Pupils are equal and round reactive to light.  Neck is supple.  Chest is equal breath sounds.  Heart is in a regular rate and rhythm.  Abdomen is benign.  Femoral and pedal pulses are diminished bilaterally.    The recent studies are reviewed and include the CTA showing significant superficial femoral and popliteal artery disease bilaterally.    Recommendation is for angiography of the extremities and intervention as warranted.  The patient seems to be understanding and agreeable.

## 2022-09-09 DIAGNOSIS — Z79.01 LONG TERM (CURRENT) USE OF ANTICOAGULANTS: ICD-10-CM

## 2022-09-09 DIAGNOSIS — I73.9 PAD (PERIPHERAL ARTERY DISEASE): Primary | ICD-10-CM

## 2022-09-09 DIAGNOSIS — I73.9 PERIPHERAL VASCULAR DISEASE, UNSPECIFIED: ICD-10-CM

## 2022-09-14 ENCOUNTER — TELEPHONE (OUTPATIENT)
Dept: CARDIOLOGY | Facility: CLINIC | Age: 72
End: 2022-09-14
Payer: MEDICARE

## 2022-09-14 NOTE — TELEPHONE ENCOUNTER
Spoke with pt and discussed Dr. House's recommendation. Pt states that his symptoms have stopped and does not plan on stopping pletal at this time, but will let us know if he experiences these symptoms again. Pt will call back with any further questions or concerns.    ----- Message from Gil House MD sent at 9/14/2022 12:43 PM CDT -----  Contact: [phone]  Can stop plteal  ----- Message -----  From: Marium Oropeza LPN  Sent: 9/9/2022   4:20 PM CDT  To: Gil House MD    Spoke with pt and advised him to check with PCP, regarding symptoms. I also advised him to go to urgent care or ED to further evaluate these symptoms, since it is Friday evening. Pt verbalized understanding but still requested that I send a message to you as well. Please advise.  ----- Message -----  From: Zeinab Asencio  Sent: 9/9/2022  12:14 PM CDT  To: Harsh SANTOS Staff    Pt is calling regarding medications, cilostazoL (PLETAL) 50 MG Tab,meloxicam (MOBIC) 7.5 MG tablet . States one of these meds is upsetting his stomach and making his stool black . Please call back at [phone] . Shereen/ignacio

## 2022-09-23 ENCOUNTER — TELEPHONE (OUTPATIENT)
Dept: VASCULAR SURGERY | Facility: CLINIC | Age: 72
End: 2022-09-23
Payer: MEDICARE

## 2022-09-23 NOTE — TELEPHONE ENCOUNTER
----- Message from iRta Stanley sent at 9/23/2022 12:49 PM CDT -----  Contact: Wife Marlon  Type:  Appointment Request    Caller is requesting an appointment.      Name of Caller:  Marlon Wife  When is the first available appointment?  N/a  Symptoms:  2 wk f/u   Best Call Back Number:  [phone]  Additional Information:  Thank You.

## 2022-10-07 ENCOUNTER — OFFICE VISIT (OUTPATIENT)
Dept: VASCULAR SURGERY | Facility: CLINIC | Age: 72
End: 2022-10-07
Payer: MEDICARE

## 2022-10-07 VITALS
HEIGHT: 72 IN | DIASTOLIC BLOOD PRESSURE: 87 MMHG | WEIGHT: 151.63 LBS | BODY MASS INDEX: 20.54 KG/M2 | HEART RATE: 57 BPM | SYSTOLIC BLOOD PRESSURE: 176 MMHG

## 2022-10-07 DIAGNOSIS — I73.9 PAD (PERIPHERAL ARTERY DISEASE): Primary | ICD-10-CM

## 2022-10-07 PROCEDURE — 99024 PR POST-OP FOLLOW-UP VISIT: ICD-10-PCS | Mod: POP,,, | Performed by: THORACIC SURGERY (CARDIOTHORACIC VASCULAR SURGERY)

## 2022-10-07 PROCEDURE — 99024 POSTOP FOLLOW-UP VISIT: CPT | Mod: POP,,, | Performed by: THORACIC SURGERY (CARDIOTHORACIC VASCULAR SURGERY)

## 2022-10-07 PROCEDURE — 99999 PR PBB SHADOW E&M-EST. PATIENT-LVL IV: CPT | Mod: PBBFAC,,, | Performed by: THORACIC SURGERY (CARDIOTHORACIC VASCULAR SURGERY)

## 2022-10-07 PROCEDURE — 99214 OFFICE O/P EST MOD 30 MIN: CPT | Mod: PBBFAC,PN | Performed by: THORACIC SURGERY (CARDIOTHORACIC VASCULAR SURGERY)

## 2022-10-07 PROCEDURE — 99999 PR PBB SHADOW E&M-EST. PATIENT-LVL IV: ICD-10-PCS | Mod: PBBFAC,,, | Performed by: THORACIC SURGERY (CARDIOTHORACIC VASCULAR SURGERY)

## 2022-10-07 RX ORDER — GUAIFENESIN 600 MG/1
1200 TABLET, EXTENDED RELEASE ORAL
COMMUNITY
Start: 2022-09-13

## 2022-10-07 NOTE — PROGRESS NOTES
This patient is status post angiography and angioplasty of the right superficial femoral artery.  He comes back to the office today in follow-up.  He does note some improvement in the walking distance since the procedure.    Medicines are noted and part of the epic record.  His problem list was reviewed.    On exam vital signs are stable.  He has no complication from the puncture site in the left groin.    Perfusion to the right leg and foot is satisfactory.    I explained to the patient that if the left leg this causes significant claudication then consideration can be made for angiography and intervention from a posterior approach to the left popliteal artery.    The patient will take this under advisement.  He will continue to walk as I stressed to him that this will help his ability to increase his activity level.  He seems to be understanding and will let us know if he will want to proceed with further intervention with angiography.